# Patient Record
Sex: FEMALE | Race: WHITE | NOT HISPANIC OR LATINO | URBAN - METROPOLITAN AREA
[De-identification: names, ages, dates, MRNs, and addresses within clinical notes are randomized per-mention and may not be internally consistent; named-entity substitution may affect disease eponyms.]

---

## 2017-03-07 ENCOUNTER — EMERGENCY (EMERGENCY)
Facility: HOSPITAL | Age: 82
LOS: 0 days | Discharge: HOME | End: 2017-03-07
Attending: EMERGENCY MEDICINE | Admitting: EMERGENCY MEDICINE
Payer: MEDICARE

## 2017-03-07 VITALS
TEMPERATURE: 98 F | HEART RATE: 86 BPM | DIASTOLIC BLOOD PRESSURE: 54 MMHG | OXYGEN SATURATION: 100 % | SYSTOLIC BLOOD PRESSURE: 136 MMHG | RESPIRATION RATE: 18 BRPM

## 2017-03-07 VITALS — HEIGHT: 61 IN | WEIGHT: 173.94 LBS

## 2017-03-07 DIAGNOSIS — R60.0 LOCALIZED EDEMA: ICD-10-CM

## 2017-03-07 DIAGNOSIS — M79.89 OTHER SPECIFIED SOFT TISSUE DISORDERS: ICD-10-CM

## 2017-03-07 LAB
ALBUMIN SERPL ELPH-MCNC: 3.5 G/DL — SIGNIFICANT CHANGE UP (ref 3.3–5)
ALP SERPL-CCNC: 84 U/L — SIGNIFICANT CHANGE UP (ref 40–120)
ALT FLD-CCNC: 17 U/L — SIGNIFICANT CHANGE UP (ref 12–78)
ANION GAP SERPL CALC-SCNC: 7 MMOL/L — SIGNIFICANT CHANGE UP (ref 5–17)
AST SERPL-CCNC: 15 U/L — SIGNIFICANT CHANGE UP (ref 15–37)
BASOPHILS # BLD AUTO: 0.1 K/UL — SIGNIFICANT CHANGE UP (ref 0–0.2)
BASOPHILS NFR BLD AUTO: 0.7 % — SIGNIFICANT CHANGE UP (ref 0–2)
BILIRUB SERPL-MCNC: 0.2 MG/DL — SIGNIFICANT CHANGE UP (ref 0.2–1.2)
BUN SERPL-MCNC: 16 MG/DL — SIGNIFICANT CHANGE UP (ref 7–23)
CALCIUM SERPL-MCNC: 9.4 MG/DL — SIGNIFICANT CHANGE UP (ref 8.5–10.1)
CHLORIDE SERPL-SCNC: 112 MMOL/L — HIGH (ref 96–108)
CO2 SERPL-SCNC: 26 MMOL/L — SIGNIFICANT CHANGE UP (ref 22–31)
CREAT SERPL-MCNC: 0.53 MG/DL — SIGNIFICANT CHANGE UP (ref 0.5–1.3)
EOSINOPHIL # BLD AUTO: 0.2 K/UL — SIGNIFICANT CHANGE UP (ref 0–0.5)
EOSINOPHIL NFR BLD AUTO: 2.2 % — SIGNIFICANT CHANGE UP (ref 0–6)
GLUCOSE SERPL-MCNC: 107 MG/DL — HIGH (ref 70–99)
HCT VFR BLD CALC: 37.2 % — SIGNIFICANT CHANGE UP (ref 34.5–45)
HGB BLD-MCNC: 12 G/DL — SIGNIFICANT CHANGE UP (ref 11.5–15.5)
LYMPHOCYTES # BLD AUTO: 2.1 K/UL — SIGNIFICANT CHANGE UP (ref 1–3.3)
LYMPHOCYTES # BLD AUTO: 22.1 % — SIGNIFICANT CHANGE UP (ref 13–44)
MCHC RBC-ENTMCNC: 28.5 PG — SIGNIFICANT CHANGE UP (ref 27–34)
MCHC RBC-ENTMCNC: 32.2 GM/DL — SIGNIFICANT CHANGE UP (ref 32–36)
MCV RBC AUTO: 88.4 FL — SIGNIFICANT CHANGE UP (ref 80–100)
MONOCYTES # BLD AUTO: 0.7 K/UL — SIGNIFICANT CHANGE UP (ref 0–0.9)
MONOCYTES NFR BLD AUTO: 7.4 % — SIGNIFICANT CHANGE UP (ref 2–14)
NEUTROPHILS # BLD AUTO: 6.4 K/UL — SIGNIFICANT CHANGE UP (ref 1.8–7.4)
NEUTROPHILS NFR BLD AUTO: 67.6 % — SIGNIFICANT CHANGE UP (ref 43–77)
PLATELET # BLD AUTO: 212 K/UL — SIGNIFICANT CHANGE UP (ref 150–400)
POTASSIUM SERPL-MCNC: 4.2 MMOL/L — SIGNIFICANT CHANGE UP (ref 3.5–5.3)
POTASSIUM SERPL-SCNC: 4.2 MMOL/L — SIGNIFICANT CHANGE UP (ref 3.5–5.3)
PROT SERPL-MCNC: 6.7 GM/DL — SIGNIFICANT CHANGE UP (ref 6–8.3)
RBC # BLD: 4.21 M/UL — SIGNIFICANT CHANGE UP (ref 3.8–5.2)
RBC # FLD: 13.8 % — SIGNIFICANT CHANGE UP (ref 10.3–14.5)
SODIUM SERPL-SCNC: 145 MMOL/L — SIGNIFICANT CHANGE UP (ref 135–145)
WBC # BLD: 9.4 K/UL — SIGNIFICANT CHANGE UP (ref 3.8–10.5)
WBC # FLD AUTO: 9.4 K/UL — SIGNIFICANT CHANGE UP (ref 3.8–10.5)

## 2017-03-07 PROCEDURE — 99284 EMERGENCY DEPT VISIT MOD MDM: CPT

## 2017-03-07 PROCEDURE — 93970 EXTREMITY STUDY: CPT | Mod: 26

## 2017-03-07 NOTE — ED ADULT TRIAGE NOTE - CHIEF COMPLAINT QUOTE
dx of bilateral cellulitis on abx (cefadroxil) at home with no relief. sent in by Dr. Carpio for IV abx.

## 2017-03-07 NOTE — ED STATDOCS - NS ED MD SCRIBE ATTENDING SCRIBE SECTIONS
REVIEW OF SYSTEMS/DISPOSITION/PHYSICAL EXAM/PROGRESS NOTE/HISTORY OF PRESENT ILLNESS/RESULTS/PAST MEDICAL/SURGICAL/SOCIAL HISTORY/CONSULTATIONS/SHIFT CHANGE

## 2017-03-07 NOTE — ED STATDOCS - PROGRESS NOTE DETAILS
Reviewed labs and CT results with patient and patient family, no DVT, legs with 1+ pitting edema but only mild erythema with mild warmth, not spreading, improved erythema s/p antibiotics from Dr. Carpio, recommended f/u with Dr. Swain and elevatation of LAI Garvin PA-C

## 2017-03-07 NOTE — ED STATDOCS - ATTENDING CONTRIBUTION TO CARE
I, Beth Moreau, performed the initial face to face bedside interview with this patient regarding history of present illness, review of symptoms and relevant past medical, social and family history.  I completed an independent physical examination.  I was the initial provider who evaluated this patient. I have signed out the follow up of any pending tests (i.e. labs, radiological studies) to the ACP with instructions to review any pertinent findings to me prior to determining a final disposition.  I have communicated the patient’s plan of care and disposition with the ACP.  The history, relevant review of systems, past medical and surgical history, medical decision making, and physical examination was documented by the scribe in my presence and I attest to the accuracy of the documentation.

## 2017-03-07 NOTE — ED STATDOCS - OBJECTIVE STATEMENT
83 y/o F presents to ED with swelling and burning sensation to bilateral LE. Hot to touch. Saw Dr Carpio on Friday and was given cefadroxil and advised to go ER if sx had not improved by yesterday. Denies fever. Pt states swelling has improved with abx. Daughter at bedside.

## 2017-03-07 NOTE — ED ADULT NURSE NOTE - OBJECTIVE STATEMENT
Pt states she went to primary MD for cellulitis of left leg.  Pt states that she was on antibiotics and states the "swelling as gone down a little and the redness has gone down a little".

## 2017-03-07 NOTE — ED STATDOCS - MEDICAL DECISION MAKING DETAILS
bilateral lower leg swelling and redness, bilateral us r/o dvt, basic labs if all normal may be d/c tohome

## 2017-03-07 NOTE — ED STATDOCS - SKIN, MLM
Bilateral lower leg edema, left greater than right. Chronic vascular changes bilateral lower legs. Warm to touch, equal to touch in temperature. Cap refill <2 seconds bilateral toes. Unable to palpate pulse secondary to swelling. Skin in tact.

## 2017-03-31 NOTE — ED STATDOCS - DISCHARGE DATE
Changed refill to 90 days.  Will send to  to call patient about appointment once approved.    Pending Prescriptions:                       Disp   Refills    lisdexamfetamine (VYVANSE) 30 MG capsule  90 cap*0            Sig: Take 1 capsule (30 mg) by mouth every morning    Amparo Christie CMA     07-Mar-2017

## 2017-06-24 ENCOUNTER — EMERGENCY (EMERGENCY)
Facility: HOSPITAL | Age: 82
LOS: 0 days | Discharge: LEFT BEFORE DISCHARGE | End: 2017-06-24
Attending: EMERGENCY MEDICINE | Admitting: EMERGENCY MEDICINE
Payer: MEDICARE

## 2017-06-24 VITALS
HEART RATE: 92 BPM | SYSTOLIC BLOOD PRESSURE: 118 MMHG | TEMPERATURE: 98 F | OXYGEN SATURATION: 98 % | DIASTOLIC BLOOD PRESSURE: 83 MMHG | RESPIRATION RATE: 18 BRPM

## 2017-06-24 VITALS — HEIGHT: 65 IN

## 2017-06-24 DIAGNOSIS — E78.5 HYPERLIPIDEMIA, UNSPECIFIED: ICD-10-CM

## 2017-06-24 DIAGNOSIS — M79.89 OTHER SPECIFIED SOFT TISSUE DISORDERS: ICD-10-CM

## 2017-06-24 DIAGNOSIS — E11.9 TYPE 2 DIABETES MELLITUS WITHOUT COMPLICATIONS: ICD-10-CM

## 2017-06-24 DIAGNOSIS — I10 ESSENTIAL (PRIMARY) HYPERTENSION: ICD-10-CM

## 2017-06-24 DIAGNOSIS — L53.8 OTHER SPECIFIED ERYTHEMATOUS CONDITIONS: ICD-10-CM

## 2017-06-24 PROCEDURE — 99284 EMERGENCY DEPT VISIT MOD MDM: CPT

## 2017-06-24 NOTE — ED STATDOCS - OBJECTIVE STATEMENT
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 The pt provides that she had left leg cellulitis 2 weeks ago and finished her abx Cefadroxil  this Thursday, and started having swelling over the old area. No h/o cp, cough, sob, headache, fever, chills, dizziness, abd pain, nvd, dysuria or urinary incontinence.

## 2017-06-24 NOTE — ED STATDOCS - PROGRESS NOTE DETAILS
81 yo female with a PMH of of cad, stents, dm, htn, hld presents with left lower leg redness. Per daughter, pt was seen before and placed on cefadroxil for 10 days with resolution. 81 yo female with a PMH of of cad, stents, dm, htn, hld presents with left lower leg redness. Per daughter, pt was seen before and placed on cefadroxil for 10 days with resolution. Now they notice the redness coming back. Was told by pmd to come to the ER. Will get sono to r/o DVT> Discussed with Dr. LEWIS, if sono negative will another 4 days of abx to prevent another cellulitis event. -Jamal Oseguera PA-C Per daughter, pt wants to leave and does not want the sono. She is aware of the risks of leaving AMA and is A&Ox3 and capable to make decisions for herself. Pt was prescribed the medication to her pharmacy. - Phoebe Oseguera PA-C

## 2017-06-24 NOTE — ED STATDOCS - CHPI ED SYMPTOM NEG
no dysuria/no blood in stool/no burning urination/no nausea/no fever/no abdominal distention/no hematuria/no vomiting/no palpitations/no diarrhea

## 2017-06-24 NOTE — ED STATDOCS - ATTENDING CONTRIBUTION TO CARE
I, Beth Moreau, performed the initial face to face bedside interview with this patient regarding history of present illness, review of symptoms and relevant past medical, social and family history.  I completed an independent physical examination.  I was the initial provider who evaluated this patient. I have signed out the follow up of any pending tests (i.e. labs, radiological studies) to the ACP with instructions to review any pertinent findings to me prior to determining a final disposition.

## 2017-06-24 NOTE — ED STATDOCS - SKIN, MLM
2x3 healing wound on left shin on medial side with mild swelling, erythematous, tenderness on palpation

## 2017-08-01 ENCOUNTER — APPOINTMENT (OUTPATIENT)
Dept: SURGICAL ONCOLOGY | Facility: CLINIC | Age: 82
End: 2017-08-01
Payer: MEDICARE

## 2017-08-01 VITALS
DIASTOLIC BLOOD PRESSURE: 74 MMHG | HEART RATE: 84 BPM | OXYGEN SATURATION: 96 % | WEIGHT: 168 LBS | BODY MASS INDEX: 31.72 KG/M2 | SYSTOLIC BLOOD PRESSURE: 150 MMHG | HEIGHT: 61 IN

## 2017-08-01 PROCEDURE — 99215 OFFICE O/P EST HI 40 MIN: CPT

## 2018-08-07 ENCOUNTER — APPOINTMENT (OUTPATIENT)
Dept: SURGICAL ONCOLOGY | Facility: CLINIC | Age: 83
End: 2018-08-07
Payer: MEDICARE

## 2018-08-07 VITALS
HEIGHT: 61 IN | HEART RATE: 94 BPM | SYSTOLIC BLOOD PRESSURE: 120 MMHG | WEIGHT: 178 LBS | RESPIRATION RATE: 15 BRPM | DIASTOLIC BLOOD PRESSURE: 75 MMHG | BODY MASS INDEX: 33.61 KG/M2

## 2018-08-07 DIAGNOSIS — Z85.3 ENCOUNTER FOR FOLLOW-UP EXAMINATION AFTER COMPLETED TREATMENT FOR MALIGNANT NEOPLASM: ICD-10-CM

## 2018-08-07 DIAGNOSIS — Z08 ENCOUNTER FOR FOLLOW-UP EXAMINATION AFTER COMPLETED TREATMENT FOR MALIGNANT NEOPLASM: ICD-10-CM

## 2018-08-07 PROCEDURE — 99214 OFFICE O/P EST MOD 30 MIN: CPT

## 2018-11-29 ENCOUNTER — INPATIENT (INPATIENT)
Facility: HOSPITAL | Age: 83
LOS: 2 days | Discharge: NURSING, REHAB OR LONG-TERM CARE | End: 2018-12-02
Attending: HOSPITALIST | Admitting: HOSPITALIST
Payer: MEDICARE

## 2018-11-29 VITALS
RESPIRATION RATE: 15 BRPM | TEMPERATURE: 98 F | DIASTOLIC BLOOD PRESSURE: 80 MMHG | HEART RATE: 87 BPM | HEIGHT: 65 IN | SYSTOLIC BLOOD PRESSURE: 136 MMHG | OXYGEN SATURATION: 100 % | WEIGHT: 169.98 LBS

## 2018-11-29 LAB
ANION GAP SERPL CALC-SCNC: 9 MMOL/L — SIGNIFICANT CHANGE UP (ref 5–17)
BASOPHILS # BLD AUTO: 0.05 K/UL — SIGNIFICANT CHANGE UP (ref 0–0.2)
BASOPHILS NFR BLD AUTO: 0.5 % — SIGNIFICANT CHANGE UP (ref 0–2)
BUN SERPL-MCNC: 17 MG/DL — SIGNIFICANT CHANGE UP (ref 7–23)
CALCIUM SERPL-MCNC: 9.5 MG/DL — SIGNIFICANT CHANGE UP (ref 8.5–10.1)
CHLORIDE SERPL-SCNC: 110 MMOL/L — HIGH (ref 96–108)
CO2 SERPL-SCNC: 25 MMOL/L — SIGNIFICANT CHANGE UP (ref 22–31)
CREAT SERPL-MCNC: 0.58 MG/DL — SIGNIFICANT CHANGE UP (ref 0.5–1.3)
EOSINOPHIL # BLD AUTO: 0.14 K/UL — SIGNIFICANT CHANGE UP (ref 0–0.5)
EOSINOPHIL NFR BLD AUTO: 1.4 % — SIGNIFICANT CHANGE UP (ref 0–6)
GLUCOSE BLDC GLUCOMTR-MCNC: 159 MG/DL — HIGH (ref 70–99)
GLUCOSE SERPL-MCNC: 124 MG/DL — HIGH (ref 70–99)
HCT VFR BLD CALC: 37.6 % — SIGNIFICANT CHANGE UP (ref 34.5–45)
HGB BLD-MCNC: 12.1 G/DL — SIGNIFICANT CHANGE UP (ref 11.5–15.5)
IMM GRANULOCYTES NFR BLD AUTO: 0.7 % — SIGNIFICANT CHANGE UP (ref 0–1.5)
LYMPHOCYTES # BLD AUTO: 1.82 K/UL — SIGNIFICANT CHANGE UP (ref 1–3.3)
LYMPHOCYTES # BLD AUTO: 17.9 % — SIGNIFICANT CHANGE UP (ref 13–44)
MCHC RBC-ENTMCNC: 29.3 PG — SIGNIFICANT CHANGE UP (ref 27–34)
MCHC RBC-ENTMCNC: 32.2 GM/DL — SIGNIFICANT CHANGE UP (ref 32–36)
MCV RBC AUTO: 91 FL — SIGNIFICANT CHANGE UP (ref 80–100)
MONOCYTES # BLD AUTO: 0.84 K/UL — SIGNIFICANT CHANGE UP (ref 0–0.9)
MONOCYTES NFR BLD AUTO: 8.3 % — SIGNIFICANT CHANGE UP (ref 2–14)
NEUTROPHILS # BLD AUTO: 7.26 K/UL — SIGNIFICANT CHANGE UP (ref 1.8–7.4)
NEUTROPHILS NFR BLD AUTO: 71.2 % — SIGNIFICANT CHANGE UP (ref 43–77)
NRBC # BLD: 0 /100 WBCS — SIGNIFICANT CHANGE UP (ref 0–0)
PLATELET # BLD AUTO: 192 K/UL — SIGNIFICANT CHANGE UP (ref 150–400)
POTASSIUM SERPL-MCNC: 4.5 MMOL/L — SIGNIFICANT CHANGE UP (ref 3.5–5.3)
POTASSIUM SERPL-SCNC: 4.5 MMOL/L — SIGNIFICANT CHANGE UP (ref 3.5–5.3)
RBC # BLD: 4.13 M/UL — SIGNIFICANT CHANGE UP (ref 3.8–5.2)
RBC # FLD: 13.4 % — SIGNIFICANT CHANGE UP (ref 10.3–14.5)
SODIUM SERPL-SCNC: 144 MMOL/L — SIGNIFICANT CHANGE UP (ref 135–145)
WBC # BLD: 10.18 K/UL — SIGNIFICANT CHANGE UP (ref 3.8–10.5)
WBC # FLD AUTO: 10.18 K/UL — SIGNIFICANT CHANGE UP (ref 3.8–10.5)

## 2018-11-29 PROCEDURE — 72125 CT NECK SPINE W/O DYE: CPT | Mod: 26

## 2018-11-29 PROCEDURE — 73590 X-RAY EXAM OF LOWER LEG: CPT | Mod: 26,LT

## 2018-11-29 PROCEDURE — 70450 CT HEAD/BRAIN W/O DYE: CPT | Mod: 26

## 2018-11-29 PROCEDURE — 99285 EMERGENCY DEPT VISIT HI MDM: CPT

## 2018-11-29 PROCEDURE — 73562 X-RAY EXAM OF KNEE 3: CPT | Mod: 26,LT

## 2018-11-29 PROCEDURE — 73630 X-RAY EXAM OF FOOT: CPT | Mod: 26,LT

## 2018-11-29 PROCEDURE — 72170 X-RAY EXAM OF PELVIS: CPT | Mod: 26

## 2018-11-29 PROCEDURE — 93010 ELECTROCARDIOGRAM REPORT: CPT

## 2018-11-29 RX ORDER — INSULIN LISPRO 100/ML
VIAL (ML) SUBCUTANEOUS AT BEDTIME
Qty: 0 | Refills: 0 | Status: DISCONTINUED | OUTPATIENT
Start: 2018-11-29 | End: 2018-12-02

## 2018-11-29 RX ORDER — DEXTROSE 50 % IN WATER 50 %
12.5 SYRINGE (ML) INTRAVENOUS ONCE
Qty: 0 | Refills: 0 | Status: DISCONTINUED | OUTPATIENT
Start: 2018-11-29 | End: 2018-12-02

## 2018-11-29 RX ORDER — DEXTROSE 50 % IN WATER 50 %
25 SYRINGE (ML) INTRAVENOUS ONCE
Qty: 0 | Refills: 0 | Status: DISCONTINUED | OUTPATIENT
Start: 2018-11-29 | End: 2018-12-02

## 2018-11-29 RX ORDER — HEPARIN SODIUM 5000 [USP'U]/ML
5000 INJECTION INTRAVENOUS; SUBCUTANEOUS EVERY 12 HOURS
Qty: 0 | Refills: 0 | Status: DISCONTINUED | OUTPATIENT
Start: 2018-11-29 | End: 2018-12-02

## 2018-11-29 RX ORDER — ASPIRIN/CALCIUM CARB/MAGNESIUM 324 MG
81 TABLET ORAL DAILY
Qty: 0 | Refills: 0 | Status: DISCONTINUED | OUTPATIENT
Start: 2018-11-29 | End: 2018-12-02

## 2018-11-29 RX ORDER — LOSARTAN POTASSIUM 100 MG/1
25 TABLET, FILM COATED ORAL DAILY
Qty: 0 | Refills: 0 | Status: DISCONTINUED | OUTPATIENT
Start: 2018-11-29 | End: 2018-12-02

## 2018-11-29 RX ORDER — ATORVASTATIN CALCIUM 80 MG/1
80 TABLET, FILM COATED ORAL AT BEDTIME
Qty: 0 | Refills: 0 | Status: DISCONTINUED | OUTPATIENT
Start: 2018-11-29 | End: 2018-12-02

## 2018-11-29 RX ORDER — DEXTROSE 50 % IN WATER 50 %
15 SYRINGE (ML) INTRAVENOUS ONCE
Qty: 0 | Refills: 0 | Status: DISCONTINUED | OUTPATIENT
Start: 2018-11-29 | End: 2018-12-02

## 2018-11-29 RX ORDER — ACETAMINOPHEN 500 MG
650 TABLET ORAL EVERY 6 HOURS
Qty: 0 | Refills: 0 | Status: DISCONTINUED | OUTPATIENT
Start: 2018-11-29 | End: 2018-12-02

## 2018-11-29 RX ORDER — GLUCAGON INJECTION, SOLUTION 0.5 MG/.1ML
1 INJECTION, SOLUTION SUBCUTANEOUS ONCE
Qty: 0 | Refills: 0 | Status: DISCONTINUED | OUTPATIENT
Start: 2018-11-29 | End: 2018-12-02

## 2018-11-29 RX ORDER — INSULIN LISPRO 100/ML
VIAL (ML) SUBCUTANEOUS
Qty: 0 | Refills: 0 | Status: DISCONTINUED | OUTPATIENT
Start: 2018-11-29 | End: 2018-12-02

## 2018-11-29 RX ORDER — SODIUM CHLORIDE 9 MG/ML
1000 INJECTION INTRAMUSCULAR; INTRAVENOUS; SUBCUTANEOUS
Qty: 0 | Refills: 0 | Status: DISCONTINUED | OUTPATIENT
Start: 2018-11-29 | End: 2018-12-02

## 2018-11-29 RX ORDER — SODIUM CHLORIDE 9 MG/ML
1000 INJECTION, SOLUTION INTRAVENOUS
Qty: 0 | Refills: 0 | Status: DISCONTINUED | OUTPATIENT
Start: 2018-11-29 | End: 2018-12-02

## 2018-11-29 NOTE — CONSULT NOTE ADULT - SUBJECTIVE AND OBJECTIVE BOX
Date of Service: 11/29/18  CHIEF COMPLAINT: left foot pain.  HPI: 84 year old female patient history significant for Diabetes Mellitus Type II was seen by Podiatry in the ED on 11/29/18 with the cc of left foot pain. Patient's daughter accompanies patient for the duration of the patient's visit. According to patient, she tripped and fell at 12:30 PM on 11/29/18  when she went to  her phone, Patient states she hit her head and landed on her left foot. Patient states she began to have left foot pain, pointing to the outer side of his left foot as the area of maximal pain. Patient also admits to left knee pain. Patient denies loss of consciousness from the fall. Patient denies left ankle pain and denies any trauma to the right foot and right ankle. Patient rates the pain a 9 out of 10 on the Visual Analog scale. Patient states she is having pain and difficulty walking on the left foot after the injury. Patient denies taking any over the counter pain medications for treatment. Patient denies any f/n/v/c/sob/cp/ab pain at this time.   Allergies  codeine, Darvon  PAST MDICAL & SURGICAL HISTORy: Diabetes mellitus type 2 according to patient    SOCIAL HISTORY:  patient denies tobacco, alcohol, and recreational drug use.    VITALS:  ICU Vital Signs Last 24 Hrs  T(C): 36.8 (29 Nov 2018 17:17), Max: 36.8 (29 Nov 2018 13:58)  T(F): 98.3 (29 Nov 2018 17:17), Max: 98.3 (29 Nov 2018 13:58)  HR: 85 (29 Nov 2018 17:17) (75 - 87)  BP: 152/71 (29 Nov 2018 17:17) (136/80 - 152/71)  BP(mean): --  ABP: --  ABP(mean): --  RR: 17 (29 Nov 2018 17:17) (15 - 18)  SpO2: 99% (29 Nov 2018 17:17) (99% - 100%)      REVIEW OF SYSTEMS:  CONSTITUTIONAL: No weakness, fevers or chills  EYES/ENT: No visual changes;  No vertigo or throat pain   NECK: No pain or stiffness  RESPIRATORY: No cough, wheezing, hemoptysis; No shortness of breath  CARDIOVASCULAR: No chest pain or palpitations  GASTROINTESTINAL: No abdominal or epigastric pain. No nausea, vomiting, or hematemesis; No diarrhea or constipation. No melena or hematochezia.  GENITOURINARY: No dysuria, frequency or hematuria  NEUROLOGICAL: No numbness or weakness  SKIN: visible swelling to the outer aspect of the left foot with no breaks in the skin integrity of the left foot.  All other review of systems is negative unless indicated above    	  Physical Exam Lower Extremity Focused:  Dermatolgic: No open lesions or inter-digital macerations noted bilateral. Minimal Edema noted to the lateral forefoot left foot; - eccymosis noted to the left foot.  Vascular: Dorsalis Pedis and Posterior Tibial pulses 2/4 b/l. Capillary re-fill time less than 3 seconds digits 1-5 bilateral. Temperature gradient warm to cool b/l  Neurologic: Protective and light touch sensation intact to the level of the digits bilateral.  Orthopedic: Pain on palpation lateral border of left foot near the base of left 5th metatatrsal bone. Pain with ROM of motion of the ankle and subtalar joints left side and Muscle power graded 4/5 with ankle plantarflexion and dorsiflexion as well as subtalar joint inversion and eversion against resistance. Pt able to demonstrate wiggling of all 10 toes. No pain on palpation at the medial and lateral malleoli of the left ankle. Negative anterior drawer and negative talar tilt tests b/l.   RAD:   < from: Xray Foot AP + Lateral + Oblique, Left (11.29.18 @ 14:49) >    IMPRESSION:   nondisplaced transverse fracture through the base of the   fifth metacarpal. Small plantar calcaneal spur.     < end of copied text >  < from: Xray Pelvis AP only (11.29.18 @ 14:50) >  IMPRESSION:   Unremarkable radiographs of the pelvis.         < end of copied text >    < from: CT Cervical Spine No Cont (11.29.18 @ 14:38) >  IMPRESSION:   No vertebral fracture is recognized.    < end of copied text >    < from: CT Head No Cont (11.29.18 @ 14:31) >  IMPRESSION:   mild periventricular white matter ischemia.   Global   atrophy.      < end of copied text >    Xray Knee: Official report pending. Reading of knee deferred to radiologist.

## 2018-11-29 NOTE — ED ADULT NURSE NOTE - CHPI ED NUR SYMPTOMS NEG
no abrasion/no bleeding/no confusion/no numbness/no weakness/no deformity/no tingling/no loss of consciousness/no fever/no vomiting

## 2018-11-29 NOTE — ED PROVIDER NOTE - CONSTITUTIONAL, MLM
normal... older white female, alert, no respiratory discomfort, non toxic, normocephalic, atraumatic, scalp appears intact

## 2018-11-29 NOTE — ED PROVIDER NOTE - OBJECTIVE STATEMENT
85 y/o female with no pertinent PMHx presents to the ED s/p fall. Pt reports at 12:30PM she went to  the phone when she tripped over her feet and fell back, hitting her head on a utility cart. No LOC, unable to get up after the fall. Now c/o neck pain and left ankle/foot pain. Denies hip pain. On baby ASA. 85 y/o female with no pertinent PMHx presents to the ED s/p fall. Pt reports at 12:30PM she went to  the phone when she tripped over her feet and fell back, hitting her head on a utility cart. No LOC, unable to get up after the fall. Now c/o neck pain and left ankle/foot/leg pain. Denies hip pain. On baby ASA. PCP: Shirin.

## 2018-11-29 NOTE — ED PROVIDER NOTE - CARE PLAN
Principal Discharge DX:	Closed nondisplaced fracture of fifth metatarsal bone of left foot, initial encounter  Secondary Diagnosis:	Fall, initial encounter

## 2018-11-29 NOTE — ED PROVIDER NOTE - ENMT, MLM
Airway patent, Nasal mucosa clear. Mouth with normal mucosa. Throat has no vesicles, no oropharyngeal exudates and uvula is midline. oropharynx clear, +mucous membranes

## 2018-11-29 NOTE — ED PROVIDER NOTE - EYES, MLM
Clear bilaterally, pupils equal, round and reactive to light. EOMI Clear bilaterally, pupils equal, round and reactive to light. EOMI, negative raccoons

## 2018-11-29 NOTE — ED PROVIDER NOTE - MUSCULOSKELETAL [+], MLM
+left ankle pain +left foot pain/NECK PAIN NECK PAIN/+left ankle pain +left foot pain +left leg pain

## 2018-11-29 NOTE — H&P ADULT - NSHPPHYSICALEXAM_GEN_ALL_CORE
Vital Signs Last 24 Hrs  T(C): 36.8 (29 Nov 2018 17:17), Max: 36.8 (29 Nov 2018 13:58)  T(F): 98.3 (29 Nov 2018 17:17), Max: 98.3 (29 Nov 2018 13:58)  HR: 85 (29 Nov 2018 17:17) (75 - 87)  BP: 152/71 (29 Nov 2018 17:17) (136/80 - 152/71)  BP(mean): --  RR: 17 (29 Nov 2018 17:17) (15 - 18)  SpO2: 99% (29 Nov 2018 17:17) (99% - 100%)    GEN: appears comfortable  Neuro: AAOx3, nonfocal  HEENT: NC/AT, EOMI  Neck: no thyroidmegaly, no JVD  Cardiovascular: S1S2 present, regular rhythm, soft systolic murmur  Respiratory: breath sounds normal bilaterally, no wheezing, no rales, no rhonchi  Gastrointestinal: bowel sounds normal, soft, no abdominal tenderness  Musculoskeletal: no muscle tenderness on right, left foot wrapped by podiatry  Extremities: No edema  Skin: No rash

## 2018-11-29 NOTE — H&P ADULT - ASSESSMENT
84 y.o. female with PMH HTN, HLD, DM2, CAD s/p stent 10 yr ago, right breast cancer s/p mastectomy 27 yr ago presents with s/p fall. Pt reports walking to answer the phone when she tripped and fell backwards with neck trauma. Pt reports initial pain, but denies any at this time. Denies lightheadedness or dizziness prior to falling. No LOC. Denies fever, chills, CP, SOB, abd pain, dysuria, diarrhea. Pt reports difficulty walking due to pain. Pt baseline lives at home and does ADLs.    #s/p fall, mechanical by history  #left nondisplaced transverse fracture of 5th metatarsal bone  #difficulty ambulating  -admit to med surg  -PT eval  -f/u podiatry consult appreciated  -prn tylenol for pain  -gentle iv fluid    #DM2  -hold metformin and januvia  -fingerstick monitoring and ISS    #HTN, HLD  -cont losartan, statin  -will start at low ARB dose, may need to titrate up (pt doesn't remember the dosage)    #CAD s/p stent  -on ASA and statin    #DVT ppx  -heparin SC    #med list based on [facility] as pt doesnt remember the dosage  -will need additional verification in AM    #advanced care planning  -discussed with patient regarding code status/advanced directives  -pt wishes that be resusitated if anything were to happen to her  -pt is FULL code  -time: 16 min

## 2018-11-29 NOTE — ED PROVIDER NOTE - MUSCULOSKELETAL, MLM
Spine appears normal, range of motion is not limited, no muscle or joint tenderness Spine appears normal, range of motion is not limited, no muscle or joint tenderness. Back: non tender, stable. Neck: non tender, supple, without pain. Left anterior superior iliac spine mildly tender, no swelling. +bilateral SLR 25 degrees. Left knee: mildly swollen, no discoloration, no gross deformity. Left anterior shin mildy TTP. Left foot: +tender dorsum overlying 4/5 metatarsal, no deformity. LLE distal motor sensory normal, DP pulse good.

## 2018-11-29 NOTE — ED ADULT NURSE NOTE - NSIMPLEMENTINTERV_GEN_ALL_ED
Implemented All Fall with Harm Risk Interventions:  [place] to call system. Call bell, personal items and telephone within reach. Instruct patient to call for assistance. Room bathroom lighting operational. Non-slip footwear when patient is off stretcher. Physically safe environment: no spills, clutter or unnecessary equipment. Stretcher in lowest position, wheels locked, appropriate side rails in place. Provide visual cue, wrist band, yellow gown, etc. Monitor gait and stability. Monitor for mental status changes and reorient to person, place, and time. Review medications for side effects contributing to fall risk. Reinforce activity limits and safety measures with patient and family. Provide visual clues: red socks.

## 2018-11-29 NOTE — ED PROVIDER NOTE - CHPI ED SYMPTOMS POS
PAIN/+neck pain +left ankle pain +left foot pain PAIN/+neck pain +left ankle pain +left foot pain +left leg pain

## 2018-11-29 NOTE — CONSULT NOTE ADULT - ASSESSMENT
Assessment:  Patient is a 84 year old female seen by Podiatry in the ED for the followin. closed comminuted non-displaced fracture at base of 5th metatarsal bone at the styloid process, left foot  2.Pain in left foot  3. Difficulty walking    Plan:  Patient was seen and examined by Podiatry; treatment and plan were discussed with attending Dr. Kerry Rose.  Imaging was reviewed noting non-displaced fracture at base of 5th metatarsal bone at the styloid process with comminution , left foot.  After assessment of patient, patient's lower extremity was immboilized with application posterior splint using stockinette, cast padding, ACE and fiberglass splint material. Splint was applied with patient's left ankle held in neutral position.   Surgical shoe given to patient.  Patient advised to wear surgical shoe over the guillen compression and posterior splint and partial weight bear on the heel with concommiant use of a walker at all times when ambulating.   PT consult placed for dispensing walker and training patient to use walker while patient is partial weight bearing on the left heel with surgical shoe over the guillen compression and splint on the left side at all times when ambulating.   Patient admitted under care by medicine appreciated.   Patient advised to practice rest, ice, elevation and compression of the left lower extrremity.  Patient advised he has to keep splint clean dry, and intact at all times.  Instructed to patient if she continues to walk on left foot the fracture may risk the possibilty of further fracturing of the bone and/or displacement of the bone and further eccentric pull of the peoreus brevis tendon, all of which are possibilities. Patient advised of the possibility of surgery in the future for treatment if his fracture does not heal appropriately.   Patient and daughter demonstrated verbal understanding of all interventions and treatment. Patient tolerated interventions well and without incident.   Podiatry will follow patient while in house.

## 2018-11-29 NOTE — ED PROVIDER NOTE - PROGRESS NOTE DETAILS
Dr. Matson:  Podiatry resident aware of ED consult.  Signout to Morenita Cash: [] Podiatry consult,  [] expect D/C afterwards w/ walker, outpt Podiatry f/u. Faith MAGDALENO: As per Signout pt is to await podiatry consult and be d/c.   Podiatry saw patient Faith MAGDALENO: Pt family has concerns about patient going home. Unsafe d/c because family states cannot care for her at all.

## 2018-11-29 NOTE — ED PROVIDER NOTE - MEDICAL DECISION MAKING DETAILS
83 y/o female 85 y/o female on baby ASA BIBA from home s/p lost balance and hit head. No LOC. Pt complains of left foot pain and neck pain. Plan trauma alert, CT head/c-spine, XR left foot and pelvis, observe, reassess.

## 2018-11-30 LAB
ANION GAP SERPL CALC-SCNC: 10 MMOL/L — SIGNIFICANT CHANGE UP (ref 5–17)
BASOPHILS # BLD AUTO: 0.05 K/UL — SIGNIFICANT CHANGE UP (ref 0–0.2)
BASOPHILS NFR BLD AUTO: 0.7 % — SIGNIFICANT CHANGE UP (ref 0–2)
BUN SERPL-MCNC: 16 MG/DL — SIGNIFICANT CHANGE UP (ref 7–23)
CALCIUM SERPL-MCNC: 9.1 MG/DL — SIGNIFICANT CHANGE UP (ref 8.5–10.1)
CHLORIDE SERPL-SCNC: 107 MMOL/L — SIGNIFICANT CHANGE UP (ref 96–108)
CO2 SERPL-SCNC: 26 MMOL/L — SIGNIFICANT CHANGE UP (ref 22–31)
CREAT SERPL-MCNC: 0.61 MG/DL — SIGNIFICANT CHANGE UP (ref 0.5–1.3)
EOSINOPHIL # BLD AUTO: 0.12 K/UL — SIGNIFICANT CHANGE UP (ref 0–0.5)
EOSINOPHIL NFR BLD AUTO: 1.6 % — SIGNIFICANT CHANGE UP (ref 0–6)
GLUCOSE BLDC GLUCOMTR-MCNC: 117 MG/DL — HIGH (ref 70–99)
GLUCOSE BLDC GLUCOMTR-MCNC: 150 MG/DL — HIGH (ref 70–99)
GLUCOSE BLDC GLUCOMTR-MCNC: 182 MG/DL — HIGH (ref 70–99)
GLUCOSE BLDC GLUCOMTR-MCNC: 210 MG/DL — HIGH (ref 70–99)
GLUCOSE SERPL-MCNC: 141 MG/DL — HIGH (ref 70–99)
HBA1C BLD-MCNC: 7.3 % — HIGH (ref 4–5.6)
HCT VFR BLD CALC: 35.6 % — SIGNIFICANT CHANGE UP (ref 34.5–45)
HGB BLD-MCNC: 11.6 G/DL — SIGNIFICANT CHANGE UP (ref 11.5–15.5)
IMM GRANULOCYTES NFR BLD AUTO: 0.9 % — SIGNIFICANT CHANGE UP (ref 0–1.5)
LYMPHOCYTES # BLD AUTO: 1.47 K/UL — SIGNIFICANT CHANGE UP (ref 1–3.3)
LYMPHOCYTES # BLD AUTO: 19.2 % — SIGNIFICANT CHANGE UP (ref 13–44)
MCHC RBC-ENTMCNC: 28.7 PG — SIGNIFICANT CHANGE UP (ref 27–34)
MCHC RBC-ENTMCNC: 32.6 GM/DL — SIGNIFICANT CHANGE UP (ref 32–36)
MCV RBC AUTO: 88.1 FL — SIGNIFICANT CHANGE UP (ref 80–100)
MONOCYTES # BLD AUTO: 0.71 K/UL — SIGNIFICANT CHANGE UP (ref 0–0.9)
MONOCYTES NFR BLD AUTO: 9.3 % — SIGNIFICANT CHANGE UP (ref 2–14)
NEUTROPHILS # BLD AUTO: 5.23 K/UL — SIGNIFICANT CHANGE UP (ref 1.8–7.4)
NEUTROPHILS NFR BLD AUTO: 68.3 % — SIGNIFICANT CHANGE UP (ref 43–77)
NRBC # BLD: 0 /100 WBCS — SIGNIFICANT CHANGE UP (ref 0–0)
PLATELET # BLD AUTO: 176 K/UL — SIGNIFICANT CHANGE UP (ref 150–400)
POTASSIUM SERPL-MCNC: 4 MMOL/L — SIGNIFICANT CHANGE UP (ref 3.5–5.3)
POTASSIUM SERPL-SCNC: 4 MMOL/L — SIGNIFICANT CHANGE UP (ref 3.5–5.3)
RBC # BLD: 4.04 M/UL — SIGNIFICANT CHANGE UP (ref 3.8–5.2)
RBC # FLD: 13.6 % — SIGNIFICANT CHANGE UP (ref 10.3–14.5)
SODIUM SERPL-SCNC: 143 MMOL/L — SIGNIFICANT CHANGE UP (ref 135–145)
WBC # BLD: 7.65 K/UL — SIGNIFICANT CHANGE UP (ref 3.8–10.5)
WBC # FLD AUTO: 7.65 K/UL — SIGNIFICANT CHANGE UP (ref 3.8–10.5)

## 2018-11-30 PROCEDURE — 99221 1ST HOSP IP/OBS SF/LOW 40: CPT

## 2018-11-30 RX ADMIN — Medication 650 MILLIGRAM(S): at 10:34

## 2018-11-30 RX ADMIN — Medication 2: at 12:58

## 2018-11-30 RX ADMIN — HEPARIN SODIUM 5000 UNIT(S): 5000 INJECTION INTRAVENOUS; SUBCUTANEOUS at 18:27

## 2018-11-30 RX ADMIN — ATORVASTATIN CALCIUM 80 MILLIGRAM(S): 80 TABLET, FILM COATED ORAL at 21:36

## 2018-11-30 RX ADMIN — Medication 650 MILLIGRAM(S): at 00:45

## 2018-11-30 RX ADMIN — Medication 650 MILLIGRAM(S): at 01:15

## 2018-11-30 RX ADMIN — ATORVASTATIN CALCIUM 80 MILLIGRAM(S): 80 TABLET, FILM COATED ORAL at 00:39

## 2018-11-30 RX ADMIN — HEPARIN SODIUM 5000 UNIT(S): 5000 INJECTION INTRAVENOUS; SUBCUTANEOUS at 06:45

## 2018-11-30 RX ADMIN — Medication 81 MILLIGRAM(S): at 12:19

## 2018-11-30 RX ADMIN — LOSARTAN POTASSIUM 25 MILLIGRAM(S): 100 TABLET, FILM COATED ORAL at 06:45

## 2018-11-30 RX ADMIN — Medication 650 MILLIGRAM(S): at 11:04

## 2018-11-30 NOTE — CHART NOTE - NSCHARTNOTEFT_GEN_A_CORE
Podiatry PM Rounding:    Patient seen at bedside by Podiatry.   Patient states she feels her splint and bandaging on her Left lower extremity extend too proximal, limiting her knee ROM and exacerbating her anterior and posterior knee pain s/p mechanical fall yesterday, 11/30.   [place] compression left intact and patient was resplinted with posterior splint ending approximately 5 inches shorter than last splint.  Splint was applied with no complications. Patient related the splint felt much better and that she was able to better move her knee through ROM.   Also discussed matter with Orthopedics given Ortho consult for knee pain. Ortho consult note noted.  Patient expressed satisfaction with application of new splint.   Podiatry will continue to follow patient. Podiatry PM Rounding:    Patient seen at bedside by Podiatry.   Patient states she feels her splint and bandaging on her Left lower extremity extend too proximal, limiting her knee ROM and exacerbating her anterior and posterior knee pain s/p mechanical fall yesterday, 11/29/18.   Amezcua compression left intact and patient was resplinted with posterior splint ending approximately 5 inches shorter than last splint.  Splint was applied with no complications. Patient related the splint felt much better and that she was able to better move her knee through ROM.   Also discussed matter with Orthopedics given Ortho consult for knee pain. Ortho consult note noted.  Patient expressed satisfaction with application of new splint.   Podiatry will continue to follow patient.

## 2018-11-30 NOTE — PATIENT PROFILE ADULT - NSPROEDALEARNPREF_GEN_A_NUR
individual instruction/pictorial/skill demonstration/audio/video/written material/group instruction/verbal instruction

## 2018-11-30 NOTE — PHYSICAL THERAPY INITIAL EVALUATION ADULT - GENERAL OBSERVATIONS, REHAB EVAL
L LE splinted / surgical shoe donned; pt rec'd in bed supine; R UE edemeatous (Hx of lymphedema); pt denied pain @ rest

## 2018-11-30 NOTE — PHYSICAL THERAPY INITIAL EVALUATION ADULT - MODALITIES TREATMENT COMMENTS
pt left seated in chair post Eval; chair alarm donned; L LE elevated on stool/pillow; callbell in reach; pt instructed not to get up alone; call nursing for assist; wong fair; pain level 7/10; Dr. Ross present / aware; CARO Storm made aware pt OOB

## 2018-11-30 NOTE — CONSULT NOTE ADULT - SUBJECTIVE AND OBJECTIVE BOX
Orthopedics Consult Note:    This is a 84y Female who admitted to Medical service with a left 5th metatarsal fracture s/p trip and fall yesterday. Orthopedics consult placed to evaluate pt c/o left knee pain. Pt seen at bedside on the floor reporting chronic B/L knee pain (L>R) secondary to OA. Pt reports increased posterior knee pain s/p "bandage placement" for foot fracture. Pt reports the "bandage feels too tight." Pt reports head trauma, denies LOC. Pt denies any other injuries. Pt denies nuPt denies any other injuries. Pt denies any numbness, tingling or parethesias.    Past Medical/Surgical History:  CLOSED FRACTURE OF FIFTH METATARSAL BONE OF LEFT FOOT  MEWS Score  No pertinent past medical history  No pertinent past medical history  No pertinent past medical history  Closed nondisplaced fracture of fifth metatarsal bone of left foot, initial encounter  No significant past surgical history  No significant past surgical history  No significant past surgical history  No significant past surgical history  FALL  90+  Fall, initial encounter    Medication:  codeine (Other)  Darvon (Other)    Imaging:  X-rays of the left knee demonstrate severe OA; no evidence of acute fracture.   X-rays of the left foot demonstrate a nondisplaced base of the 5th metatarsal fracture.    Labs:                        11.6   7.65  )-----------( 176      ( 30 Nov 2018 06:58 )             35.6   11-30    143  |  107  |  16  ----------------------------<  141<H>  4.0   |  26  |  0.61    Ca    9.1      30 Nov 2018 06:58    PE Left Knee:   Posterior splint and surgical shoe in place, posterior splint goes into popliteal fossa. No swelling, no erythema, no ecchymosis, skin intact, normothermic. +mild MJLT B/L. +TTP under proximal splint edge. Difficulty assessing ROM 2' posterior splint too long, can flex to ~45' due mechanical block with proximal splint edge digging into popliteal fossa w/ associated pain. No pain with micromotion. Able to SLR. No pain with log roll. No pain with axial loading. Cap refill <2secs. Moving all toes, sensation intact.     Secondary Survey:  Left hip, RLE and B/L UEs with no swelling, no ecchymoses, no abrasions, no lacerations or ny other signs of injury with full painless baseline ROM and no bony TTP. Sensation intact distally, moving all digits. Distal pulses intact.     Spine and ribs with no bony TTP.     Assessment:  84y Female with a left knee pain 2' severe OA and posterior splint too long.     Plan:  No acute orthopedics intervention indicated at this time.  Recommend Podiatry follow up to trim posterior splint.  Management of 5th metatarsal fracture per Podiatry.  Weight-bearing status per Podiatry; no restrictions from Orthopedics standpoint.  DVT prophylaxis.  Pain control.  Ice application.  RLE elevation.  Follow-up with Dr. Smith within 2-4 weeks of discharge as needed; Call office for appointment.  Orthopedically stable for discharge.    Will discuss case with Dr. Smith and advise if any change in plan.

## 2018-11-30 NOTE — PROGRESS NOTE ADULT - ASSESSMENT
Assessment and Recommendation:   · Assessment		    Assessment:  Patient is a 84 year old female seen by Podiatry for the followin. closed comminuted non-displaced fracture at base of 5th metatarsal bone at the styloid process, left foot  2.Pain in left foot  3. Difficulty walking  4. DM Type 2    Plan:  Patient was seen and examined by Podiatry; treatment and plan were discussed with attending Dr. Kerry Rose.  Amezcua compression and splint left intact at this visit. Surgical shoe noted on patient's left foot while in bed.   Patient advised to wear surgical shoe over the amezcua compression and posterior splint and partial weight bear on the heel with concommiant use of a walker at all times when ambulating.   Patient to wear a heel protector on the right side.   PT consult placed for dispensing walker and training patient to use walker while patient is partial weight bearing on the left heel with surgical shoe over the amezcua compression and splint on the left side at all times when ambulating.   Care by medicine appreciated.  Patient advised to practice rest, ice, elevation and compression of the left lower extrremity.  Patient advised he has to keep splint clean dry, and intact at all times.  Instructed to patient if she continues to walk on left foot the fracture may risk the possibilty of further fracturing of the bone and/or displacement of the bone and further eccentric pull of the peoreus brevis tendon, all of which are possibilities. Patient advised of the possibility of surgery in the future for treatment if his fracture does not heal appropriately.   Patient demonstrated verbal understanding of all interventions at this time.   Podiatry will follow patient while in house.

## 2018-11-30 NOTE — PHYSICAL THERAPY INITIAL EVALUATION ADULT - ACTIVE RANGE OF MOTION EXAMINATION, REHAB EVAL
no Active ROM deficits were identified/except L LE: hip/knee flex 40/40; ankle limited due to splint; R shld FE 90; L shld FE 80; Note: pt c/o pain with L knee ROM / posteior palpation; CARO Storm made aware

## 2018-11-30 NOTE — PROGRESS NOTE ADULT - SUBJECTIVE AND OBJECTIVE BOX
Date of Service: 11/30/18  HPI: 84 year old female patient history significant for Diabetes Mellitus Type II was seen by Podiatry in the ED on 11/29/18 with the cc of left foot pain. Patient's daughter accompanies patient for the duration of the patient's visit. According to patient, she tripped and fell at 12:30 PM on 11/29/18  when she went to  her phone, Patient states she hit her head and landed on her left foot. Patient states she began to have left foot pain, pointing to the outer side of his left foot as the area of maximal pain. Patient also admits to left knee pain. Patient denies loss of consciousness from the fall. Patient denies left ankle pain and denies any trauma to the right foot and right ankle. Patient rates the pain a 9 out of 10 on the Visual Analog scale. Patient states she is having pain and difficulty walking on the left foot after the injury. Patient     11/30: Patient seen by Podiatry. Patient found to be resting in bed in NAD. Patient states the splint applied to her left lower extremity does not feel too tight. Patient found to be wearing surgical shoe over splint on left side while in bed. Patient denies any overnight events and states the splint has stayed clean, dry, and intact. Patient states her left foot is less painful than on 11/29. Patient denies any f/n/v/c/sob/cp/ab pain at this time.     Allergies  codeine, Darvon  PMHx and PSurgHx:   PMH HTN, HLD, DM2, CAD s/p stent 10 yr ago, right breast cancer s/p mastectomy 27 yr ago  SOCIAL HISTORY:  patient denies tobacco, alcohol, and recreational drug use.    Review of Systems: Patient denies f/n/v/c/sob/ab pain at this time.       Physical Exam Lower Extremity Focused:  Amezcua compression and posterior splint left intact at this visit. Splint is noted to be clean, dry, and intact. Exposed toes present left side with capillary refill time noted to be less than 3 seconds x 5. Patient able to demonstrate wiggling of toes. Toes are warm to touch and appear well perfused. Sensation intact to toes x 10.    RADIOLOGY/EKG:  < from: Xray Foot AP + Lateral + Oblique, Left (11.29.18 @ 14:49) >    IMPRESSION:   nondisplaced transverse fracture through the base of the   fifth metacarpal. Small plantar calcaneal spur.     < end of copied text >  < from: Xray Pelvis AP only (11.29.18 @ 14:50) >  IMPRESSION:   Unremarkable radiographs of the pelvis.         < end of copied text >    < from: CT Cervical Spine No Cont (11.29.18 @ 14:38) >  IMPRESSION:   No vertebral fracture is recognized.    < end of copied text >    < from: CT Head No Cont (11.29.18 @ 14:31) >  IMPRESSION:   mild periventricular white matter ischemia.   Global   atrophy.      < end of copied text >    Xray Knee:   < from: Xray Knee 3 Views, Left (11.29.18 @ 16:38) >    IMPRESSION:   Moderate tricompartmental osteoarthritis.     < end of copied text >      LABS: All Labs Reviewed:                        11.6   7.65  )-----------( 176      ( 30 Nov 2018 06:58 )             35.6     11-30    143  |  107  |  16  ----------------------------<  141<H>  4.0   |  26  |  0.61    Ca    9.1      30 Nov 2018 06:58      Vital Signs Last 24 Hrs  T(C): 36.7 (30 Nov 2018 05:34), Max: 36.8 (29 Nov 2018 13:58)  T(F): 98.1 (30 Nov 2018 05:34), Max: 98.3 (29 Nov 2018 13:58)  HR: 76 (30 Nov 2018 05:34) (75 - 87)  BP: 117/48 (30 Nov 2018 05:34) (117/48 - 157/53)  BP(mean): --  RR: 18 (30 Nov 2018 05:34) (15 - 18)  SpO2: 98% (30 Nov 2018 05:34) (98% - 100%)    MEDICATIONS:  MEDICATIONS  (STANDING):  aspirin  chewable 81 milliGRAM(s) Oral daily  atorvastatin 80 milliGRAM(s) Oral at bedtime  dextrose 5%. 1000 milliLiter(s) (50 mL/Hr) IV Continuous <Continuous>  dextrose 50% Injectable 12.5 Gram(s) IV Push once  dextrose 50% Injectable 25 Gram(s) IV Push once  dextrose 50% Injectable 25 Gram(s) IV Push once  heparin  Injectable 5000 Unit(s) SubCutaneous every 12 hours  insulin lispro (HumaLOG) corrective regimen sliding scale   SubCutaneous three times a day before meals  insulin lispro (HumaLOG) corrective regimen sliding scale   SubCutaneous at bedtime  losartan 25 milliGRAM(s) Oral daily  sodium chloride 0.9%. 1000 milliLiter(s) (100 mL/Hr) IV Continuous <Continuous>        I&O's Summary      CAPILLARY BLOOD GLUCOSE      POCT Blood Glucose.: 150 mg/dL (30 Nov 2018 08:04)  POCT Blood Glucose.: 159 mg/dL (29 Nov 2018 23:30)

## 2018-11-30 NOTE — PROGRESS NOTE ADULT - SUBJECTIVE AND OBJECTIVE BOX
c/c: fall, left knee/left foot pain    HPI:  84 y.o. female with PMH HTN, HLD, DM2, CAD s/p stent 10 yr ago, right breast cancer s/p mastectomy 27 yr ago presents with s/p fall. Pt reports walking to answer the phone when she tripped and fell backwards with neck trauma.  She was evaluated in ED and found to have left 5th metatarsal fracture.     11/30: pt seen and examined this am. c/o left knee and left foot pain. No sob/chest apin. No dizziness/lightheadedness/n/v/d/abd pain. No f/c/r.      Review of system- All 10 systems reviewed and is as per HPI otherwise negative.     VITALS  T(C): 36.8 (11-30-18 @ 11:01), Max: 36.8 (11-29-18 @ 13:58)  HR: 86 (11-30-18 @ 11:01) (75 - 87)  BP: 148/42 (11-30-18 @ 11:01) (117/48 - 157/53)  RR: 16 (11-30-18 @ 11:01) (15 - 18)  SpO2: 96% (11-30-18 @ 11:01) (96% - 100%)      PHYSICAL EXAM:    GENERAL: Comfortable, no acute distress  HEAD:  Atraumatic, Normocephalic  EYES: EOMI, PERRLA  HEENT: Moist mucous membranes  NECK: Supple, No JVD  NERVOUS SYSTEM:  Alert & Oriented X3,non focal  CHEST/LUNG: Clear to auscultation bilaterally  HEART: Regular rate and rhythm  ABDOMEN: Soft, Nontender, Nondistended; Bowel sounds present  GENITOURINARY- Voiding, no palpable bladder  EXTREMITIES:  No clubbing, cyanosis. LLE in ace-wrap  MUSCULOSKELETAL- Pain left knee with movement.  SKIN-no rash        LABS:                        11.6   7.65  )-----------( 176      ( 30 Nov 2018 06:58 )             35.6     11-30    143  |  107  |  16  ----------------------------<  141<H>  4.0   |  26  |  0.61    Ca    9.1      30 Nov 2018 06:58          MEDS  acetaminophen   Tablet .. 650 milliGRAM(s) Oral every 6 hours PRN  aspirin  chewable 81 milliGRAM(s) Oral daily  atorvastatin 80 milliGRAM(s) Oral at bedtime  dextrose 40% Gel 15 Gram(s) Oral once PRN  dextrose 5%. 1000 milliLiter(s) IV Continuous <Continuous>  dextrose 50% Injectable 12.5 Gram(s) IV Push once  dextrose 50% Injectable 25 Gram(s) IV Push once  dextrose 50% Injectable 25 Gram(s) IV Push once  glucagon  Injectable 1 milliGRAM(s) IntraMuscular once PRN  heparin  Injectable 5000 Unit(s) SubCutaneous every 12 hours  insulin lispro (HumaLOG) corrective regimen sliding scale   SubCutaneous three times a day before meals  insulin lispro (HumaLOG) corrective regimen sliding scale   SubCutaneous at bedtime  losartan 25 milliGRAM(s) Oral daily  sodium chloride 0.9%. 1000 milliLiter(s) IV Continuous <Continuous>    ASSESSMENT AND PLAN:  84 y.o. female with PMH HTN, HLD, DM2, CAD s/p stent 10 yr ago, right breast cancer s/p mastectomy 27 yr ago presents with s/p fall A/W:    #s/p fall, mechanical by history  #left nondisplaced transverse fracture of 5th metatarsal bone  #difficulty ambulating  -podiatry eval appreciated  -s/p guillen compression and splint with surgical shoe  -ortho eval for left knee pain/buckling while working with physical therapy, no fx noted on xray  #DM2  -hold metformin and januvia  -fingerstick monitoring and ISS    #HTN  -continue arb    # HLD  -statin    #CAD s/p stent  -on ASA and statin    #DVT ppx  -heparin SC      will d/w pt re: home meds

## 2018-12-01 LAB
GLUCOSE BLDC GLUCOMTR-MCNC: 153 MG/DL — HIGH (ref 70–99)
GLUCOSE BLDC GLUCOMTR-MCNC: 161 MG/DL — HIGH (ref 70–99)
GLUCOSE BLDC GLUCOMTR-MCNC: 173 MG/DL — HIGH (ref 70–99)
GLUCOSE BLDC GLUCOMTR-MCNC: 233 MG/DL — HIGH (ref 70–99)

## 2018-12-01 RX ADMIN — HEPARIN SODIUM 5000 UNIT(S): 5000 INJECTION INTRAVENOUS; SUBCUTANEOUS at 18:42

## 2018-12-01 RX ADMIN — Medication 1: at 12:12

## 2018-12-01 RX ADMIN — Medication 2: at 18:57

## 2018-12-01 RX ADMIN — Medication 1: at 08:03

## 2018-12-01 RX ADMIN — Medication 81 MILLIGRAM(S): at 12:12

## 2018-12-01 RX ADMIN — ATORVASTATIN CALCIUM 80 MILLIGRAM(S): 80 TABLET, FILM COATED ORAL at 23:24

## 2018-12-01 RX ADMIN — Medication 650 MILLIGRAM(S): at 06:10

## 2018-12-01 RX ADMIN — HEPARIN SODIUM 5000 UNIT(S): 5000 INJECTION INTRAVENOUS; SUBCUTANEOUS at 06:10

## 2018-12-01 RX ADMIN — Medication 650 MILLIGRAM(S): at 18:40

## 2018-12-01 RX ADMIN — Medication 650 MILLIGRAM(S): at 06:48

## 2018-12-01 RX ADMIN — LOSARTAN POTASSIUM 25 MILLIGRAM(S): 100 TABLET, FILM COATED ORAL at 06:10

## 2018-12-01 NOTE — PROGRESS NOTE ADULT - ASSESSMENT
Patient is a 84 year old female seen by Podiatry for the followin. Closed comminuted non-displaced fracture at base of 5th metatarsal bone at the styloid process, left foot  2. Pain in left foot  3. Difficulty walking  4. DM Type 2    Patient was seen and examined by Podiatry; treatment and plan were discussed with attending Dr. Kerry Rose.  Amezcua compression and splint left intact at this visit. Surgical shoe noted on patient's left foot.   Patient advised to wear surgical shoe over the amezcua compression and posterior splint and partial weight bear on the heel with concommiant use of a walker at all times when ambulating.   Patient to wear a heel protector on the right side.   PT evaluation noted and appreciated. Recommended rehab.  Patient advised to practice rest, ice, elevation and compression of the left lower extrremity.  Patient advised he has to keep splint clean dry, and intact at all times.  Instructed to patient if she continues to walk on left foot the fracture may risk the possibility of further fracturing of the bone and/or displacement of the bone and further eccentric pull of the peoreus brevis tendon, all of which are possibilities. Patient advised of the possibility of surgery in the future for treatment if fracture does not heal appropriately.   Patient demonstrated verbal understanding of all interventions at this time.   Care by Medicine appreciated.  Podiatry will follow patient while in house.

## 2018-12-01 NOTE — PROGRESS NOTE ADULT - SUBJECTIVE AND OBJECTIVE BOX
c/c: fall, left knee/left foot pain    HPI:  84 y.o. female with PMH HTN, HLD, DM2, CAD s/p stent 10 yr ago, right breast cancer s/p mastectomy 27 yr ago presents with s/p fall. Pt reports walking to answer the phone when she tripped and fell backwards with neck trauma.  She was evaluated in ED and found to have left 5th metatarsal fracture.   Seen by podiatry,s/p guillen compression and splint placement and recommended surgical shoe.    12/1: pt seen and examined this am. Pain better today. No sob/chest pain. Planned for rehab tomorrow.     Review of system- All 10 systems reviewed and is as per HPI otherwise negative.     Vital Signs Last 24 Hrs  T(C): 36.5 (01 Dec 2018 04:20), Max: 36.6 (30 Nov 2018 17:29)  T(F): 97.7 (01 Dec 2018 04:20), Max: 97.9 (30 Nov 2018 17:29)  HR: 74 (01 Dec 2018 04:20) (74 - 81)  BP: 141/57 (01 Dec 2018 04:20) (139/59 - 141/57)  RR: 16 (01 Dec 2018 04:20) (16 - 16)  SpO2: 99% (01 Dec 2018 04:20) (99% - 99%)    PHYSICAL EXAM:    GENERAL: Comfortable, no acute distress  HEAD:  Atraumatic, Normocephalic  EYES: EOMI, PERRLA  HEENT: Moist mucous membranes  NECK: Supple, No JVD  NERVOUS SYSTEM:  Alert & Oriented X3,non focal  CHEST/LUNG: Clear to auscultation bilaterally  HEART: Regular rate and rhythm  ABDOMEN: Soft, Nontender, Nondistended; Bowel sounds present  GENITOURINARY- Voiding, no palpable bladder  EXTREMITIES:  No clubbing, cyanosis. LLE in ace-wrap  MUSCULOSKELETAL- LLE in ace-wrap/splint, knee pain improved  SKIN-no rash      LABS:                        11.6   7.65  )-----------( 176      ( 30 Nov 2018 06:58 )             35.6     11-30    143  |  107  |  16  ----------------------------<  141<H>  4.0   |  26  |  0.61    Ca    9.1      30 Nov 2018 06:58          MEDS  acetaminophen   Tablet .. 650 milliGRAM(s) Oral every 6 hours PRN  aspirin  chewable 81 milliGRAM(s) Oral daily  atorvastatin 80 milliGRAM(s) Oral at bedtime  dextrose 40% Gel 15 Gram(s) Oral once PRN  dextrose 5%. 1000 milliLiter(s) IV Continuous <Continuous>  dextrose 50% Injectable 12.5 Gram(s) IV Push once  dextrose 50% Injectable 25 Gram(s) IV Push once  dextrose 50% Injectable 25 Gram(s) IV Push once  glucagon  Injectable 1 milliGRAM(s) IntraMuscular once PRN  heparin  Injectable 5000 Unit(s) SubCutaneous every 12 hours  insulin lispro (HumaLOG) corrective regimen sliding scale   SubCutaneous three times a day before meals  insulin lispro (HumaLOG) corrective regimen sliding scale   SubCutaneous at bedtime  losartan 25 milliGRAM(s) Oral daily  sodium chloride 0.9%. 1000 milliLiter(s) IV Continuous <Continuous>    ASSESSMENT AND PLAN:  84 y.o. female with PMH HTN, HLD, DM2, CAD s/p stent 10 yr ago, right breast cancer s/p mastectomy 27 yr ago presents with s/p fall A/W:    #s/p fall, mechanical by history  #left nondisplaced transverse fracture of 5th metatarsal bone  #difficulty ambulating  -podiatry eval appreciated  -s/p guillen compression and splint with surgical shoe  -ortho eval for left knee pain/buckling while working with physical therapy, no fx noted on xray  #DM2  -hold metformin and januvia  -fingerstick monitoring and ISS    #HTN  -continue arb    # HLD  -statin    #CAD s/p stent  -on ASA and statin    #DVT ppx  -heparin SC    #DIspo:  rehab tomorrow

## 2018-12-01 NOTE — PROGRESS NOTE ADULT - SUBJECTIVE AND OBJECTIVE BOX
Date of Service: 12-01-18 @ 10:55    84 year old female patient was seen initially by Podiatry in the ED on 11/29/18 with the cc of left foot pain. Patient's daughter accompanied patient for the duration of the patient's visit. According to patient, she tripped and fell at 12:30 PM on 11/29/18 when she went to  her phone, Patient stated she hit her head and landed on her left foot. Patient stated she began to have left foot pain, pointing to the outer side of his left foot as the area of maximal pain. Patient also admitted to left knee pain. Patient denied loss of consciousness from the fall. Patient denies left ankle pain and denies any trauma to the right foot and right ankle. Patient rated the pain a 9 out of 10 on the Visual Analog scale. Patient stated she is having pain and difficulty walking on the left foot after the injury.    12/1: Patient was seen for followup of left foot fracture. Patient is resting comfortably in bedside chair and is in NAD. Pt states the posterior splint feels fine on her left lower leg and foot. Pt states she had a physical therapy session this morning that went well. Pt states she has some pain in her left lower leg. Pt states she is going to be discharged to rehab tomorrow. Patient denies any f/n/v/c/sob/cp/ab pain at this time.   PMH/PSH: HTN, HLD, DM2, CAD s/p stent 10 yr ago, right breast cancer s/p mastectomy 27 yr ago  MEDICATIONS:  MEDICATIONS  (STANDING):  aspirin  chewable 81 milliGRAM(s) Oral daily  atorvastatin 80 milliGRAM(s) Oral at bedtime  dextrose 5%. 1000 milliLiter(s) (50 mL/Hr) IV Continuous <Continuous>  dextrose 50% Injectable 12.5 Gram(s) IV Push once  dextrose 50% Injectable 25 Gram(s) IV Push once  dextrose 50% Injectable 25 Gram(s) IV Push once  heparin  Injectable 5000 Unit(s) SubCutaneous every 12 hours  insulin lispro (HumaLOG) corrective regimen sliding scale   SubCutaneous three times a day before meals  insulin lispro (HumaLOG) corrective regimen sliding scale   SubCutaneous at bedtime  losartan 25 milliGRAM(s) Oral daily  sodium chloride 0.9%. 1000 milliLiter(s) (100 mL/Hr) IV Continuous <Continuous>    Allergies: Codeine, Darvon    Vital Signs Last 24 Hrs  T(C): 36.5 (01 Dec 2018 04:20), Max: 36.8 (30 Nov 2018 11:01)  T(F): 97.7 (01 Dec 2018 04:20), Max: 98.3 (30 Nov 2018 11:01)  HR: 74 (01 Dec 2018 04:20) (74 - 86)  BP: 141/57 (01 Dec 2018 04:20) (139/59 - 148/42)  BP(mean): --  RR: 16 (01 Dec 2018 04:20) (16 - 16)  SpO2: 99% (01 Dec 2018 04:20) (96% - 99%)    POCT Blood Glucose.: 161 mg/dL (01 Dec 2018 07:43)  POCT Blood Glucose.: 182 mg/dL (30 Nov 2018 21:26)  POCT Blood Glucose.: 117 mg/dL (30 Nov 2018 17:46)  POCT Blood Glucose.: 210 mg/dL (30 Nov 2018 12:16)    PHYSICAL EXAM:  Constitutional: NAD, awake and alert  Posterior splint is noted to be clean, dry, and intact. Left foot digits are noted to be pink, warm, well perfused with CFT < 3 sec x5. Active motion of the left foot digits. Sensation intact to toes x 10.    LABS: All Labs Reviewed:                        11.6   7.65  )-----------( 176      ( 30 Nov 2018 06:58 )             35.6     11-30    143  |  107  |  16  ----------------------------<  141<H>  4.0   |  26  |  0.61    Ca    9.1      30 Nov 2018 06:58    RADIOLOGY/EKG:  EXAM:  XR FOOT COMP MIN 3 VIEWS LT                          PROCEDURE DATE:  11/29/2018      INTERPRETATION:      Radiographs of the LEFT foot         CLINICAL INFORMATION:      Foot pain.    TECHNIQUE:  Frontal, oblique and lateral views of the foot were obtained.    FINDINGS:   No prior similar studies are available for review.    Osteoporosis is noted.    The forefoot demonstrates intact osseous structures without fracture or   bone lesion.  Alignment is maintained.  Joint spaces are preserved.  The   sesamoids appear intact.  No soft tissue abnormality is found.    The midfoot demonstrates a nondisplaced transverse fracture through the   base of the fifth metacarpal.    The hindfoot appears intact. Small plantar spur formation is recognized.    The soft tissues appear intact.    IMPRESSION:   nondisplaced transverse fracture through the base of the   fifth metacarpal. Small plantar calcaneal spur.

## 2018-12-02 ENCOUNTER — TRANSCRIPTION ENCOUNTER (OUTPATIENT)
Age: 83
End: 2018-12-02

## 2018-12-02 VITALS
TEMPERATURE: 98 F | HEART RATE: 82 BPM | OXYGEN SATURATION: 97 % | RESPIRATION RATE: 17 BRPM | DIASTOLIC BLOOD PRESSURE: 57 MMHG | SYSTOLIC BLOOD PRESSURE: 128 MMHG

## 2018-12-02 LAB
GLUCOSE BLDC GLUCOMTR-MCNC: 170 MG/DL — HIGH (ref 70–99)
GLUCOSE BLDC GLUCOMTR-MCNC: 218 MG/DL — HIGH (ref 70–99)

## 2018-12-02 RX ORDER — ACETAMINOPHEN 500 MG
2 TABLET ORAL
Qty: 0 | Refills: 0 | DISCHARGE
Start: 2018-12-02

## 2018-12-02 RX ORDER — LOSARTAN POTASSIUM 100 MG/1
1 TABLET, FILM COATED ORAL
Qty: 0 | Refills: 0 | DISCHARGE
Start: 2018-12-02

## 2018-12-02 RX ORDER — LOSARTAN POTASSIUM 100 MG/1
1 TABLET, FILM COATED ORAL
Qty: 0 | Refills: 0 | COMMUNITY

## 2018-12-02 RX ADMIN — Medication 650 MILLIGRAM(S): at 07:22

## 2018-12-02 RX ADMIN — Medication 1: at 08:42

## 2018-12-02 RX ADMIN — Medication 2: at 11:40

## 2018-12-02 RX ADMIN — LOSARTAN POTASSIUM 25 MILLIGRAM(S): 100 TABLET, FILM COATED ORAL at 06:53

## 2018-12-02 RX ADMIN — Medication 81 MILLIGRAM(S): at 11:41

## 2018-12-02 RX ADMIN — Medication 650 MILLIGRAM(S): at 06:52

## 2018-12-02 RX ADMIN — HEPARIN SODIUM 5000 UNIT(S): 5000 INJECTION INTRAVENOUS; SUBCUTANEOUS at 06:53

## 2018-12-02 NOTE — DISCHARGE NOTE ADULT - CARE PLAN
Principal Discharge DX:	Closed nondisplaced fracture of fifth metatarsal bone of left foot, initial encounter  Goal:	healing  Assessment and plan of treatment:	physical therapy at rehab.  follow up with podiatry as outpt.

## 2018-12-02 NOTE — DISCHARGE NOTE ADULT - HOSPITAL COURSE
84 y.o. female with PMH HTN, HLD, DM2, CAD s/p stent 10 yr ago, right breast cancer s/p mastectomy 27 yr ago presents with s/p fall. Pt reports walking to answer the phone when she tripped and fell backwards with neck trauma.  She was evaluated in ED and found to have left 5th metatarsal fracture.   Seen by podiatry and is now s/p guillen compression and splint placement and recommended surgical shoe. She was seen by physical therapy and recommended rehab. She will be discharged to rehab today    Vital Signs Last 24 Hrs  T(C): 36.7 (02 Dec 2018 05:58), Max: 36.7 (02 Dec 2018 05:58)  T(F): 98.1 (02 Dec 2018 05:58), Max: 98.1 (02 Dec 2018 05:58)  HR: 78 (02 Dec 2018 05:58) (76 - 87)  BP: 113/42 (02 Dec 2018 05:58) (113/42 - 128/42)  RR: 16 (02 Dec 2018 05:58) (16 - 16)  SpO2: 97% (02 Dec 2018 05:58) (96% - 97%)      PHYSICAL EXAM:    GENERAL: Comfortable, no acute distress  HEAD:  Atraumatic, Normocephalic  EYES: EOMI, PERRLA  HEENT: Moist mucous membranes  NECK: Supple, No JVD  NERVOUS SYSTEM:  Alert & Oriented X3,non focal  CHEST/LUNG: Clear to auscultation bilaterally  HEART: Regular rate and rhythm  ABDOMEN: Soft, Nontender, Nondistended; Bowel sounds present  GENITOURINARY- Voiding, no palpable bladder  EXTREMITIES:  No clubbing, cyanosis. LLE in ace-wrap  MUSCULOSKELETAL- LLE in ace-wrap/splint, knee pain improved  SKIN-no rash    LABS:    Complete Blood Count + Automated Diff (11.30.18 @ 06:58)    WBC Count: 7.65 K/uL    RBC Count: 4.04 M/uL    Hemoglobin: 11.6 g/dL    Hematocrit: 35.6 %    Mean Cell Volume: 88.1 fl    Mean Cell Hemoglobin: 28.7 pg    Mean Cell Hemoglobin Conc: 32.6 gm/dL    Red Cell Distrib Width: 13.6 %    Platelet Count - Automated: 176 K/uL    Basic Metabolic Panel (11.30.18 @ 06:58)    Sodium, Serum: 143 mmol/L    Potassium, Serum: 4.0 mmol/L    Chloride, Serum: 107 mmol/L    Carbon Dioxide, Serum: 26 mmol/L    Anion Gap, Serum: 10 mmol/L    Blood Urea Nitrogen, Serum: 16 mg/dL    Creatinine, Serum: 0.61 mg/dL    Glucose, Serum: 141 mg/dL    Calcium, Total Serum: 9.1 mg/dL  Xray Foot AP + Lateral + Oblique, Left (11.29.18 @ 14:49) >  IMPRESSION:   nondisplaced transverse fracture through the base of the   fifth metacarpal. Small plantar calcaneal spur.          Xray Knee 3 Views, Left (11.29.18 @ 16:38) >  IMPRESSION:   Moderate tricompartmental osteoarthritis.    FINAL DIAGNOSIS  #s/p fall, mechanical by history  #left nondisplaced transverse fracture of 5th metatarsal bone  -s/p guillen compression and splint with surgical shoe  #DM2  #HTN  # HLD  #CAD s/p stent    Time taken for dc 60 min  plan d/w patient, left message for pcp

## 2018-12-02 NOTE — PROGRESS NOTE ADULT - SUBJECTIVE AND OBJECTIVE BOX
Date of Service: 12-02-18 @ 12:42    84 year old female patient was seen initially by Podiatry in the ED on 11/29/18 with the cc of left foot pain. Patient's daughter accompanied patient for the duration of the patient's visit. According to patient, she tripped and fell at 12:30 PM on 11/29/18 when she went to  her phone, Patient stated she hit her head and landed on her left foot. Patient stated she began to have left foot pain, pointing to the outer side of his left foot as the area of maximal pain. Patient also admitted to left knee pain. Patient denied loss of consciousness from the fall. Patient denies left ankle pain and denies any trauma to the right foot and right ankle. Patient rated the pain a 9 out of 10 on the Visual Analog scale. Patient stated she is having pain and difficulty walking on the left foot after the injury.    12/2: Patient was seen for followup of left foot fracture. Patient is resting comfortably in bedr and is in NAD. Pt states she has pain behind her left knee and states her dressing is digging into the back of left knee. Pt denies any other pedal complaints.  Pt states she is going to be discharged to rehab tday. Patient denies any f/n/v/c/sob/cp/ab pain at this time.   PMH/PSH: HTN, HLD, DM2, CAD s/p stent 10 yr ago, right breast cancer s/p mastectomy 27 yr ago  MEDICATIONS:  MEDICATIONS  (STANDING):  aspirin  chewable 81 milliGRAM(s) Oral daily  atorvastatin 80 milliGRAM(s) Oral at bedtime  dextrose 5%. 1000 milliLiter(s) (50 mL/Hr) IV Continuous <Continuous>  dextrose 50% Injectable 12.5 Gram(s) IV Push once  dextrose 50% Injectable 25 Gram(s) IV Push once  dextrose 50% Injectable 25 Gram(s) IV Push once  heparin  Injectable 5000 Unit(s) SubCutaneous every 12 hours  insulin lispro (HumaLOG) corrective regimen sliding scale   SubCutaneous three times a day before meals  insulin lispro (HumaLOG) corrective regimen sliding scale   SubCutaneous at bedtime  losartan 25 milliGRAM(s) Oral daily  sodium chloride 0.9%. 1000 milliLiter(s) (100 mL/Hr) IV Continuous <Continuous>    Allergies: Codeine, Darvon    Vital Signs Last 24 Hrs  T(C): 36.6 (02 Dec 2018 11:29), Max: 36.7 (02 Dec 2018 05:58)  T(F): 97.8 (02 Dec 2018 11:29), Max: 98.1 (02 Dec 2018 05:58)  HR: 82 (02 Dec 2018 11:29) (76 - 87)  BP: 128/57 (02 Dec 2018 11:29) (113/42 - 128/57)  BP(mean): --  RR: 17 (02 Dec 2018 11:29) (16 - 17)  SpO2: 97% (02 Dec 2018 11:29) (96% - 97%)    POCT Blood Glucose.: 218 mg/dL (02 Dec 2018 11:38)  POCT Blood Glucose.: 170 mg/dL (02 Dec 2018 08:39)  POCT Blood Glucose.: 153 mg/dL (01 Dec 2018 23:22)  POCT Blood Glucose.: 233 mg/dL (01 Dec 2018 16:21)    PHYSICAL EXAM:  Constitutional: NAD, awake and alert  Extremities:   Derm-   Left foot: Posterior splint noted to be clean, dry an intact. Amezcua compression noted to end posterior to the left knee. Upon removal of posterior splint and Amezcua compression, pain relief noted. Swelling noted to be decreased of the LLE. No erythema. No open lesions. No acute signs of infection.  Right foot: No open lesions. No erythema. No acute signs of infection.  Vascular- Pedal pulses are palpable. CFT <3 sec x10. TG: WNL. Mild edema of the left foot.  Neuro- Light touch sensation intact B/L.  Ortho- Mild tenderness noted upon palpation of the left midfoot. Mild tenderness noted upon palpation of the posterior left knee that is noted to be releived with removal of the splint and amezcua compression. Compartments of B/L feet and lower legs are soft and nontender. Nedgative Amparo/Homans signs.    LABS: All Labs Reviewed:  Complete Blood Count + Automated Diff (11.30.18 @ 06:58)    WBC Count: 7.65 K/uL    RBC Count: 4.04 M/uL    Hemoglobin: 11.6 g/dL    Hematocrit: 35.6 %    Mean Cell Volume: 88.1 fl    Mean Cell Hemoglobin: 28.7 pg    Mean Cell Hemoglobin Conc: 32.6 gm/dL    Red Cell Distrib Width: 13.6 %    Platelet Count - Automated: 176 K/uL    Auto Neutrophil #: 5.23 K/uL    Auto Lymphocyte #: 1.47 K/uL    Auto Monocyte #: 0.71 K/uL    Auto Eosinophil #: 0.12 K/uL    Auto Basophil #: 0.05 K/uL    Auto Neutrophil %: 68.3: Differential percentages must be correlated with absolute numbers for  clinical significance. %    Auto Lymphocyte %: 19.2 %    Auto Monocyte %: 9.3 %    Auto Eosinophil %: 1.6 %    Auto Basophil %: 0.7 %    Auto Immature Granulocyte %: 0.9 %    Basic Metabolic Panel (11.30.18 @ 06:58)    Sodium, Serum: 143 mmol/L    Potassium, Serum: 4.0 mmol/L    Chloride, Serum: 107 mmol/L    Carbon Dioxide, Serum: 26 mmol/L    Anion Gap, Serum: 10 mmol/L    Blood Urea Nitrogen, Serum: 16 mg/dL    Creatinine, Serum: 0.61 mg/dL    Glucose, Serum: 141 mg/dL    Calcium, Total Serum: 9.1 mg/dL    eGFR if Non : 83: Interpretative comment  The units for eGFR are ml/min/1.73m2 (normalized body surface area). The  eGFR is calculated from a serum creatinine using the CKD-EPI equation.  Other variables required for calculation are race, age and sex. Among  patients with chronic kidney disease (CKD), the eGFR is useful in  determining the stage of disease according to KDOQI CKD classification.  All eGFR results are reported numerically with the following  interpretation.          GFR                    With                 Without     (ml/min/1.73 m2)    Kidney Damage       Kidney Damage        >= 90                    Stage 1                     Normal        60-89                    Stage 2                     Decreased GFR        30-59     Stage 3                     Stage 3        15-29                    Stage 4                     Stage 4        < 15                      Stage 5                     Stage 5  Each stage of CKD assumes that the associated GFR level has been in  effect for at least 3 months. Determination of stages one and two (with  eGFR > 59 ml/min/m2) requires estimation of kidney damage for at least 3  months as defined by structural or functional abnormalities.  Limitations: All estimates of GFR will be less accurate for patients at  extremes of muscle mass (including but not limited to frail elderly,  critically ill, or cancer patients), those with unusual diets, and those  with conditions associated with reduced secretion or extrarenal  elimination of creatinine. The eGFR equation is not recommended for use  in patients with unstable creatinine levels. mL/min/1.73M2    eGFR if African American: 96 mL/min/1.73M2    RADIOLOGY/EKG:    EXAM:  XR FOOT COMP MIN 3 VIEWS LT                          PROCEDURE DATE:  11/29/2018      INTERPRETATION:      Radiographs of the LEFT foot         CLINICAL INFORMATION:      Foot pain.    TECHNIQUE:  Frontal, oblique and lateral views of the foot were obtained.    FINDINGS:   No prior similar studies are available for review.    Osteoporosis is noted.    The forefoot demonstrates intact osseous structures without fracture or   bone lesion.  Alignment is maintained.  Joint spaces are preserved.  The   sesamoids appear intact.  No soft tissue abnormality is found.    The midfoot demonstrates a nondisplaced transverse fracture through the   base of the fifth metacarpal.    The hindfoot appears intact. Small plantar spur formation is recognized.    The soft tissues appear intact.    IMPRESSION:   nondisplaced transverse fracture through the base of the   fifth metacarpal. Small plantar calcaneal spur.

## 2018-12-02 NOTE — DISCHARGE NOTE ADULT - PATIENT PORTAL LINK FT
You can access the [facility] Patient Portal, offered by [facility], by registering with the following website: [email or web address]

## 2018-12-02 NOTE — DISCHARGE NOTE ADULT - ADDITIONAL INSTRUCTIONS
Patient advised to wear surgical shoe over the guillen compression and posterior splint and partial weight bear on the heel with concommiant use of a walker at all times when ambulating.   Patient to wear a heel protector on the right side.

## 2018-12-02 NOTE — DISCHARGE NOTE ADULT - CARE PROVIDER_API CALL
Kerry Rose (LINDA), Podiatric Medicine and Surgery  [address]  [address]  [place] [address]  Phone: [phone]  Fax: [phone]

## 2018-12-02 NOTE — DISCHARGE NOTE ADULT - CONDITIONS AT DISCHARGE
Call MD for any increase in pain, numbness, tingling; fever over 101F; swelling, redness, pain in calf.

## 2018-12-02 NOTE — PROGRESS NOTE ADULT - ASSESSMENT
Patient is a 84 year old female seen by Podiatry for the followin. Closed comminuted non-displaced fracture at base of 5th metatarsal bone at the styloid process, left foot  2. Pain in left foot  3. Difficulty walking  4. DM Type 2    Patient was seen and examined by Podiatry; treatment and plan were discussed with attending Dr. Kerry Rose.  Amezcua compression and posterior splint removed from the LLE. Pain relief noted.  Amezcua compression and posterior splint reapplied to the LLE, taking extra care to avoid the left popliteal fossa. CFT noted to be immediate to all digits of the left foot post splint application. Pt satisfied with new Kevin compression and posterior splint on the LLE.  Patient advised to wear surgical shoe over the amezcua compression and posterior splint and partial weight bear on the heel with concommiant use of a walker at all times when ambulating.   Patient to wear a heel protector on the right side.   PT evaluation noted and appreciated. Recommended rehab.  Patient advised to practice rest, ice, elevation and compression of the left lower extremity.  Patient advised he has to keep splint clean dry, and intact at all times.  Instructed to patient if she continues to walk on left foot the fracture may risk the possibility of further fracturing of the bone and/or displacement of the bone and further eccentric pull of the peoreus brevis tendon, all of which are possibilities. Patient advised of the possibility of surgery in the future for treatment if fracture does not heal appropriately.   Patient demonstrated verbal understanding of all interventions at this time.   Care by Medicine appreciated.  Podiatry will follow patient while in house. Patient is a 84 year old female seen by Podiatry for the followin. Closed comminuted non-displaced fracture at base of 5th metatarsal bone at the styloid process, left foot  2. Pain in left foot  3. Difficulty walking  4. DM Type 2    Patient was seen and examined by Podiatry; treatment and plan were discussed with attending Dr. Kerry Rose.  Amezcua compression and posterior splint removed from the LLE. Pain relief noted.  Amezcua compression and posterior splint reapplied to the LLE, taking extra care to avoid the left popliteal fossa. CFT noted to be immediate to all digits of the left foot post splint application. Pt satisfied with new Kevin compression and posterior splint on the LLE.  Patient advised to wear surgical shoe over the amezcua compression and posterior splint and partial weight bear on the heel with concommiant use of a walker at all times when ambulating.   Patient to wear a heel protector on the right side.   PT evaluation noted and appreciated. Recommended rehab.  Patient advised to practice rest, ice, elevation and compression of the left lower extremity.  Patient advised he has to keep splint clean dry, and intact at all times.  Instructed to patient if she continues to walk on left foot the fracture may risk the possibility of further fracturing of the bone and/or displacement of the bone and further eccentric pull of the peoreus brevis tendon, all of which are possibilities. Patient advised of the possibility of surgery in the future for treatment if fracture does not heal appropriately.   Patient demonstrated verbal understanding of all interventions at this time.   Care by Medicine appreciated.  Pt is to followup with Dr. Rose in office in 3-4 weeks for followup care of the left foot.  Podiatry will follow patient while in house.

## 2018-12-02 NOTE — DISCHARGE NOTE ADULT - MEDICATION SUMMARY - MEDICATIONS TO TAKE
I will START or STAY ON the medications listed below when I get home from the hospital:    aspirin 81 mg oral tablet, chewable  -- 1 tab(s) by mouth once a day  -- Indication: For home med    acetaminophen 325 mg oral tablet  -- 2 tab(s) by mouth every 6 hours, As needed, Temp greater or equal to 38C (100.4F), Mild Pain (1 - 3)  -- Indication: For pain    losartan 25 mg oral tablet  -- 1 tab(s) by mouth once a day  -- Indication: For home med    metFORMIN 500 mg oral tablet, extended release  -- 1 tab(s) by mouth 2 times a day  -- Indication: For home med    Januvia 50 mg oral tablet  -- 1 tab(s) by mouth once a day  -- Indication: For teodoro emed    Crestor 20 mg oral tablet  -- 1 tab(s) by mouth once a day (at bedtime)  -- Indication: For home med    Fish Oil  -- Indication: For home med

## 2018-12-05 DIAGNOSIS — E78.5 HYPERLIPIDEMIA, UNSPECIFIED: ICD-10-CM

## 2018-12-05 DIAGNOSIS — E11.9 TYPE 2 DIABETES MELLITUS WITHOUT COMPLICATIONS: ICD-10-CM
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
## 2018-12-05 DIAGNOSIS — Z88.5 ALLERGY STATUS TO NARCOTIC AGENT: ICD-10-CM

## 2018-12-05 DIAGNOSIS — Z85.3 PERSONAL HISTORY OF MALIGNANT NEOPLASM OF BREAST: ICD-10-CM

## 2018-12-05 DIAGNOSIS — I25.10 ATHEROSCLEROTIC HEART DISEASE OF NATIVE CORONARY ARTERY WITHOUT ANGINA PECTORIS: ICD-10-CM

## 2018-12-05 DIAGNOSIS — Z95.5 PRESENCE OF CORONARY ANGIOPLASTY IMPLANT AND GRAFT: ICD-10-CM

## 2018-12-05 DIAGNOSIS — Z79.84 LONG TERM (CURRENT) USE OF ORAL HYPOGLYCEMIC DRUGS: ICD-10-CM

## 2018-12-05 DIAGNOSIS — Z79.899 OTHER LONG TERM (CURRENT) DRUG THERAPY: ICD-10-CM

## 2018-12-05 DIAGNOSIS — I10 ESSENTIAL (PRIMARY) HYPERTENSION: ICD-10-CM

## 2018-12-05 DIAGNOSIS — Z79.82 LONG TERM (CURRENT) USE OF ASPIRIN: ICD-10-CM
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
## 2018-12-05 DIAGNOSIS — S92.355A NONDISPLACED FRACTURE OF FIFTH METATARSAL BONE, LEFT FOOT, INITIAL ENCOUNTER FOR CLOSED FRACTURE: ICD-10-CM

## 2019-08-13 ENCOUNTER — APPOINTMENT (OUTPATIENT)
Dept: SURGICAL ONCOLOGY | Facility: CLINIC | Age: 84
End: 2019-08-13

## 2020-02-19 ENCOUNTER — EMERGENCY (EMERGENCY)
Facility: HOSPITAL | Age: 85
LOS: 1 days | Discharge: HOME | End: 2020-02-19
Attending: EMERGENCY MEDICINE
Payer: MEDICARE

## 2020-02-19 VITALS
TEMPERATURE: 98 F | WEIGHT: 175.05 LBS | RESPIRATION RATE: 18 BRPM | OXYGEN SATURATION: 95 % | SYSTOLIC BLOOD PRESSURE: 176 MMHG | HEART RATE: 96 BPM | HEIGHT: 64 IN | DIASTOLIC BLOOD PRESSURE: 83 MMHG

## 2020-02-19 PROCEDURE — 72125 CT NECK SPINE W/O DYE: CPT | Mod: 26

## 2020-02-19 PROCEDURE — 70450 CT HEAD/BRAIN W/O DYE: CPT | Mod: 26

## 2020-02-19 PROCEDURE — 72125 CT NECK SPINE W/O DYE: CPT

## 2020-02-19 PROCEDURE — 71045 X-RAY EXAM CHEST 1 VIEW: CPT | Mod: 26

## 2020-02-19 PROCEDURE — 99284 EMERGENCY DEPT VISIT MOD MDM: CPT | Mod: GC

## 2020-02-19 PROCEDURE — 99284 EMERGENCY DEPT VISIT MOD MDM: CPT | Mod: 25

## 2020-02-19 PROCEDURE — 71045 X-RAY EXAM CHEST 1 VIEW: CPT

## 2020-02-19 PROCEDURE — 70450 CT HEAD/BRAIN W/O DYE: CPT

## 2020-02-19 PROCEDURE — 82962 GLUCOSE BLOOD TEST: CPT

## 2020-02-19 RX ORDER — ACETAMINOPHEN 500 MG
2 TABLET ORAL
Qty: 60 | Refills: 0
Start: 2020-02-19

## 2020-02-19 NOTE — ED PROVIDER NOTE - OBJECTIVE STATEMENT
85y f PMHx CAD on 81mg ASA, hypothyroidism, HTN, lung ca p/w fall. Pt and family at bedside witnessed mechanical fall - pt was using rollator to ambulate down the wheelchair ramp leaving a cardiology appt, one of the wheels became caught in the concrete and she fell backwards. She hit the back of her head, without LOC, witnessed by son. Pt remembers entire event. Nonambulatory since the fall however at baseline she cannot ambulate without walker assistance. Admits to mild "head heaviness" and "fuzzy vision." Denies pain and declining pain meds in ED. Pt lives in assisted living facility, daughter expressed concerns about her requiring admission if not feeling well even if no acute bleed/fracture/dislocation. Denies LOC, HA, neck pain, CP, SOB, abd pain, nausea, vomiting, double vision, vision loss, joint pain, back pain.

## 2020-02-19 NOTE — ED PROVIDER NOTE - ATTENDING CONTRIBUTION TO CARE
86yo female pmh CAD on aspirin, HTN, lung CA p/w fall, witnessed off of rollator. +posterior head trauma. Denies LOC, vomiting, vision changes, confusion. Denies chest pain, dyspnea, cough, fevers, abd pain, pelvis pain, extremity pain. Full ROM of all joints. AAOx3, CT head and c-spine, CXR and pain control

## 2020-02-19 NOTE — ED PROVIDER NOTE - PROGRESS NOTE DETAILS
CT of head and neck do not show any acute fracture or dislocation. No brain bleeding noted. CXR did not show any abnormalities. Patient without any additional complaints at this time. Will discharge home.

## 2020-02-19 NOTE — ED PROVIDER NOTE - PATIENT PORTAL LINK FT
You can access the FollowMyHealth Patient Portal offered by [facility] by registering at the following website: [email or web address]. By joining [employer]’s FollowMyHealth portal, you will also be able to view your health information using other applications (apps) compatible with our system.

## 2020-02-19 NOTE — ED PROVIDER NOTE - MUSCULOSKELETAL, MLM
Spine appears normal, range of motion is not limited, no muscle or joint tenderness. Unable to assess gait

## 2020-02-19 NOTE — ED PROVIDER NOTE - NSFOLLOWUPINSTRUCTIONS_ED_ALL_ED_FT
Please follow up with your primary care doctor within 1 week.    For any headache, take Tylenol 650mg every 6hrs as needed for pain.     Continue to walk with your rollator/walker as tolerated.    Return to the ED for new falls, worsening pain, dizziness, confusion, nausea, vomiting, vision changes, or any other concerns.

## 2020-02-19 NOTE — ED PROVIDER NOTE - CARE PLAN
Principal Discharge DX:	Contusion of scalp, initial encounter  Secondary Diagnosis:	Fall, initial encounter

## 2020-02-24 ENCOUNTER — EMERGENCY (EMERGENCY)
Facility: HOSPITAL | Age: 85
LOS: 0 days | Discharge: HOME | End: 2020-02-24
Attending: EMERGENCY MEDICINE
Payer: MEDICARE

## 2020-02-24 VITALS
HEART RATE: 104 BPM | TEMPERATURE: 98 F | SYSTOLIC BLOOD PRESSURE: 140 MMHG | RESPIRATION RATE: 17 BRPM | OXYGEN SATURATION: 98 % | DIASTOLIC BLOOD PRESSURE: 60 MMHG

## 2020-02-24 VITALS — WEIGHT: 175.05 LBS | HEIGHT: 61 IN

## 2020-02-24 DIAGNOSIS — R11.0 NAUSEA: ICD-10-CM

## 2020-02-24 DIAGNOSIS — E78.5 HYPERLIPIDEMIA, UNSPECIFIED: ICD-10-CM

## 2020-02-24 DIAGNOSIS — Z79.82 LONG TERM (CURRENT) USE OF ASPIRIN: ICD-10-CM

## 2020-02-24 DIAGNOSIS — E11.9 TYPE 2 DIABETES MELLITUS WITHOUT COMPLICATIONS: ICD-10-CM

## 2020-02-24 DIAGNOSIS — I10 ESSENTIAL (PRIMARY) HYPERTENSION: ICD-10-CM

## 2020-02-24 DIAGNOSIS — S06.0X9A CONCUSSION WITH LOSS OF CONSCIOUSNESS OF UNSPECIFIED DURATION, INITIAL ENCOUNTER: ICD-10-CM

## 2020-02-24 DIAGNOSIS — R42 DIZZINESS AND GIDDINESS: ICD-10-CM
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
## 2020-02-24 DIAGNOSIS — Z79.84 LONG TERM (CURRENT) USE OF ORAL HYPOGLYCEMIC DRUGS: ICD-10-CM
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
## 2020-02-24 DIAGNOSIS — M54.2 CERVICALGIA: ICD-10-CM

## 2020-02-24 DIAGNOSIS — Z88.5 ALLERGY STATUS TO NARCOTIC AGENT: ICD-10-CM

## 2020-02-24 PROCEDURE — 99284 EMERGENCY DEPT VISIT MOD MDM: CPT | Mod: 25

## 2020-02-24 PROCEDURE — 70450 CT HEAD/BRAIN W/O DYE: CPT | Mod: 26

## 2020-02-24 PROCEDURE — 99284 EMERGENCY DEPT VISIT MOD MDM: CPT

## 2020-02-24 PROCEDURE — 70450 CT HEAD/BRAIN W/O DYE: CPT

## 2020-02-24 NOTE — ED STATDOCS - PMH
Diabetes mellitus    HLD (hyperlipidemia)    HTN (hypertension)    No pertinent past medical history

## 2020-02-24 NOTE — ED ADULT TRIAGE NOTE - CHIEF COMPLAINT QUOTE
Patient presents with neck pain reports she fell last Wednesday and hit her head. Was seen at [facility] and discharged. Since fall patient has had dizziness nausea and neck pain

## 2020-02-24 NOTE — ED STATDOCS - NSFOLLOWUPINSTRUCTIONS_ED_ALL_ED_FT
Concussion    WHAT YOU NEED TO KNOW:    A concussion is a mild brain injury. It is usually caused by a bump or blow to the head from a fall, a motor vehicle crash, or a sports injury. Sometimes being shaken forcefully may cause a concussion.    DISCHARGE INSTRUCTIONS:    Have someone call 911 for any of the following:     Someone tries to wake you and cannot do so.      You have a seizure, increasing confusion, or a change in personality.      Your speech becomes slurred, or you have new vision problems.    Return to the emergency department if:     You have sudden and new vision problems.      You have a severe headache that does not go away.      You have arm or leg weakness, numbness, or new problems with coordination.      You have blood or clear fluid coming out of the ears or nose.    Contact your healthcare provider if:     You have nausea or are vomiting.      You feel more sleepy than usual.      Your symptoms get worse.      Your symptoms last longer than 6 weeks after the injury.      You have questions or concerns about your condition or care.    Medicines: You may need any of the following:     Acetaminophen decreases pain and fever. It is available without a doctor's order. Ask how much to take and how often to take it. Follow directions. Read the labels of all other medicines you are using to see if they also contain acetaminophen, or ask your doctor or pharmacist. Acetaminophen can cause liver damage if not taken correctly. Do not use more than 4 grams (4,000 milligrams) total of acetaminophen in one day.       NSAIDs help decrease swelling and pain or fever. This medicine is available with or without a doctor's order. NSAIDs can cause stomach bleeding or kidney problems in certain people. If you take blood thinner medicine, always ask your healthcare provider if NSAIDs are safe for you. Always read the medicine label and follow directions.      Take your medicine as directed. Contact your healthcare provider if you think your medicine is not helping or if you have side effects. Tell him or her if you are allergic to any medicine. Keep a list of the medicines, vitamins, and herbs you take. Include the amounts, and when and why you take them. Bring the list or the pill bottles to follow-up visits. Carry your medicine list with you in case of an emergency.    Self-care: Concussion symptoms usually go away within about 10 days, but they may last longer. The following may be recommended to manage your symptoms:     Rest from physical and mental activities as directed. Mental activities are those that require thinking, concentration, and attention. You will need to rest until your symptoms are gone. Rest will allow you to recover from your concussion. Ask your healthcare provider when you can return to work and other daily activities.      Have someone stay with you for the first 24 hours after your injury. Your healthcare provider should be contacted if your symptoms get worse, or you develop new symptoms.      Do not participate in sports and physical activities until your healthcare provider says it is okay. They could make your symptoms worse or lead to another concussion. Your healthcare provider will tell you when it is okay for you to return to sports or physical activities. Ask for more information about sports concussions.    Prevent another concussion:     Wear protective sports equipment that fits properly. Helmets help decrease your risk for a serious brain injury. Talk to your healthcare provider about ways you can decrease your risk for a concussion if you play sports.      Wear your seatbelt every time you travel. This helps to decrease your risk for a head injury if you are in a car accident.     Follow up with your healthcare provider as directed: Write down your questions so you remember to ask them during your visits.     FOLLOW UP EVALUATION  To ensure optimal concussion recovery, follow up with a doctor specialized in concussion management. An evaluation by a specialty concussion program can ensure timely return to activities.    We offer appointments through the [facility] Concussion Program’s Hotline at [phone]. Concussion    WHAT YOU NEED TO KNOW:    A concussion is a mild brain injury. It is usually caused by a bump or blow to the head from a fall, a motor vehicle crash, or a sports injury. Sometimes being shaken forcefully may cause a concussion.    DISCHARGE INSTRUCTIONS:    Have someone call 911 for any of the following:     Someone tries to wake you and cannot do so.      You have a seizure, increasing confusion, or a change in personality.      Your speech becomes slurred, or you have new vision problems.    Return to the emergency department if:     You have sudden and new vision problems.      You have a severe headache that does not go away.      You have arm or leg weakness, numbness, or new problems with coordination.      You have blood or clear fluid coming out of the ears or nose.    Contact your healthcare provider if:     You have nausea or are vomiting.      You feel more sleepy than usual.      Your symptoms get worse.      Your symptoms last longer than 6 weeks after the injury.      You have questions or concerns about your condition or care.    Medicines: You may need any of the following:     Acetaminophen decreases pain and fever. It is available without a doctor's order. Ask how much to take and how often to take it. Follow directions. Read the labels of all other medicines you are using to see if they also contain acetaminophen, or ask your doctor or pharmacist. Acetaminophen can cause liver damage if not taken correctly. Do not use more than 4 grams (4,000 milligrams) total of acetaminophen in one day.       NSAIDs help decrease swelling and pain or fever. This medicine is available with or without a doctor's order. NSAIDs can cause stomach bleeding or kidney problems in certain people. If you take blood thinner medicine, always ask your healthcare provider if NSAIDs are safe for you. Always read the medicine label and follow directions.      Take your medicine as directed. Contact your healthcare provider if you think your medicine is not helping or if you have side effects. Tell him or her if you are allergic to any medicine. Keep a list of the medicines, vitamins, and herbs you take. Include the amounts, and when and why you take them. Bring the list or the pill bottles to follow-up visits. Carry your medicine list with you in case of an emergency.    Self-care: Concussion symptoms usually go away within about 10 days, but they may last longer. The following may be recommended to manage your symptoms:     Rest from physical and mental activities as directed. Mental activities are those that require thinking, concentration, and attention. You will need to rest until your symptoms are gone. Rest will allow you to recover from your concussion. Ask your healthcare provider when you can return to work and other daily activities.      Have someone stay with you for the first 24 hours after your injury. Your healthcare provider should be contacted if your symptoms get worse, or you develop new symptoms.      Do not participate in sports and physical activities until your healthcare provider says it is okay. They could make your symptoms worse or lead to another concussion. Your healthcare provider will tell you when it is okay for you to return to sports or physical activities. Ask for more information about sports concussions.    Prevent another concussion:     Wear protective sports equipment that fits properly. Helmets help decrease your risk for a serious brain injury. Talk to your healthcare provider about ways you can decrease your risk for a concussion if you play sports.      Wear your seatbelt every time you travel. This helps to decrease your risk for a head injury if you are in a car accident.     Follow up with your healthcare provider as directed: Write down your questions so you remember to ask them during your visits.     FOLLOW UP EVALUATION  To ensure optimal concussion recovery, follow up with a doctor specialized in concussion management. An evaluation by a specialty concussion program can ensure timely return to activities.    We offer appointments through the [facility] Concussion Program’s Hotline at [phone].    Cervical Strain    WHAT YOU NEED TO KNOW:    A cervical strain is a stretched or torn muscle or tendon in your neck. Tendons are strong tissues that connect muscles to bones.    DISCHARGE INSTRUCTIONS:    Seek care immediately if:     You have pain or numbness from your shoulder down to your hand.      You have problems with your vision, hearing, or balance.      You feel confused or cannot concentrate.      You have problems with movement and strength.    Call your doctor if:     You have increased swelling or pain in your neck.       You have questions or concerns about your condition or care.    Medicines: You may need any of the following:     Acetaminophen decreases pain and fever. It is available without a doctor's order. Ask how much to take and how often to take it. Follow directions. Read the labels of all other medicines you are using to see if they also contain acetaminophen, or ask your doctor or pharmacist. Acetaminophen can cause liver damage if not taken correctly. Do not use more than 4 grams (4,000 milligrams) total of acetaminophen in one day.       NSAIDs, such as ibuprofen, help decrease swelling, pain, and fever. This medicine is available with or without a doctor's order. NSAIDs can cause stomach bleeding or kidney problems in certain people. If you take blood thinner medicine, always ask your healthcare provider if NSAIDs are safe for you. Always read the medicine label and follow directions.      Muscle relaxers help decrease pain and muscle spasms.      Prescription pain medicine may be given. Ask your healthcare provider how to take this medicine safely. Some prescription pain medicines contain acetaminophen. Do not take other medicines that contain acetaminophen without talking to your healthcare provider. Too much acetaminophen may cause liver damage. Prescription pain medicine may cause constipation. Ask your healthcare provider how to prevent or treat constipation.       Take your medicine as directed. Contact your healthcare provider if you think your medicine is not helping or if you have side effects. Tell him or her if you are allergic to any medicine. Keep a list of the medicines, vitamins, and herbs you take. Include the amounts, and when and why you take them. Bring the list or the pill bottles to follow-up visits. Carry your medicine list with you in case of an emergency.    Manage your symptoms:     Apply heat on your neck for 15 to 20 minutes, 4 to 6 times a day or as directed. Heat helps decrease pain, stiffness, and muscle spasms.      Begin gentle neck exercises as soon as you can move your neck without pain. Exercises will help decrease stiffness and improve the strength and movement of your neck. Ask your healthcare provider what kind of exercises you should do.      Gradually return to your usual activities as directed. Stop if you have pain. Avoid activities that can cause more damage to your neck, such as heavy lifting or strenuous exercise.      Sleep without a pillow to help decrease pain. Instead, roll a small towel tightly and place it under your neck.       Go to physical therapy as directed. A physical therapist teaches you exercises to help improve movement and strength, and to decrease pain.     Prevent another neck injury:     Drive safely. Make sure everyone in your car wears a seatbelt. A seatbelt can save your life if you are in an accident. Do not use your cell phone when you are driving. This could distract you and cause an accident. Pull over if you need to make a call or send a text message.       Wear helmets, lifejackets, and protective gear. Always wear a helmet when you ride a bike or motorcycle, go skiing, or play sports that could cause a head injury. Wear protective equipment when you play sports. Wear a lifejacket when you are on a boat or doing water sports.     Follow up with your doctor as directed: You may be referred to an orthopedist or physical therapies. Write down your questions so you remember to ask them during your visits.     FOLLOW UP WITH YOUR NEUROLOGIST OR THE [facility] CONCUSSION CLINIC IN 1-2 DAYS FOR YOUR CONCUSSION AND NECK PAIN. YOU MAY USE A HEATING PAD OR WARM COMPRESSES OR ICE FOR YOUR NECK PAIN, WHICHEVER SEEMS TO HELP. RETURN TO ER FOR ANY WORSENING SYMPTOMS OR NEW CONCERNS.

## 2020-02-24 NOTE — ED STATDOCS - OBJECTIVE STATEMENT
86 y/o female with a PMHx of DM on Metformin, HTN, HLD, presents to the ED c/o neck pain. Pt fell 5 days ago outside of her doctor's office and hit head. Pt was seen at [place], had CT head and neck at that time, resulting negative. Pt now with neck pain, dizziness, and nausea. On 81mg ASA. No other complaints at this time.

## 2020-02-24 NOTE — ED ADULT NURSE NOTE - OBJECTIVE STATEMENT
Pt. c/o of neck pain since friday. Pt. states she had a fall on occiput on wednesday, +blood thinners, was seen and discharged from [facility] s/p neg CT scans. Pt. states that neck pain began when trying to lift head off pillow in AM on friday and has become worst. Pt. denies photophobia, visual changes.

## 2020-02-24 NOTE — ED STATDOCS - PATIENT PORTAL LINK FT
You can access the FollowMyHealth Patient Portal offered by [facility] by registering at the following website: [email or web address]. By joining [employer]’s FollowMyHealth portal, you will also be able to view your health information using other applications (apps) compatible with our system.

## 2020-02-24 NOTE — ED ADULT NURSE NOTE - NSIMPLEMENTINTERV_GEN_ALL_ED
Implemented All Fall with Harm Risk Interventions:  [place] to call system. Call bell, personal items and telephone within reach. Instruct patient to call for assistance. Room bathroom lighting operational. Non-slip footwear when patient is off stretcher. Physically safe environment: no spills, clutter or unnecessary equipment. Stretcher in lowest position, wheels locked, appropriate side rails in place. Provide visual cue, wrist band, yellow gown, etc. Monitor gait and stability. Monitor for mental status changes and reorient to person, place, and time. Review medications for side effects contributing to fall risk. Reinforce activity limits and safety measures with patient and family. Provide visual clues: red socks.

## 2020-02-24 NOTE — ED STATDOCS - PROGRESS NOTE DETAILS
signed Rosario Verdugo PA-C Pt seen initially in intake by Dr. Blancas   85F BIB family for persistent HA, dizziness, neck pain in the mornings, and "not feeling right" since pt fell and hit her head on 2/19, was seen in [place] ED and had negative CT head/c spine. Pts name was misspelled (Corinna) on that chart. Repeat CT negative in ED. Pt alert, NAD. Likely concussion symptoms, recommend pt f/u with her neurologist Dr Zuniga or concussion clinic. Neck pain likely muscular, recommend heat or ice, may need physical therapy. Pt is very reluctant to take meds for anything and does not want rx for neck pain. Pt feeling well, pt and family agree with DC and plan of care.

## 2020-02-24 NOTE — ED ADULT NURSE NOTE - CHIEF COMPLAINT QUOTE
Patient presents with neck pain reports she fell last Wednesday and hit her head. Was seen at [facility] and discharged. Since fall patient has had dizziness nausea and neck pain

## 2021-01-20 NOTE — ED STATDOCS - CHIEF COMPLAINT
The patient is a 85y year old Female complaining of neck pain. Consent (Nose)/Introductory Paragraph: The rationale for Mohs was explained to the patient and consent was obtained. The risks, benefits and alternatives to therapy were discussed in detail. Specifically, the risks of nasal deformity, changes in the flow of air through the nose, infection, scarring, bleeding, prolonged wound healing, incomplete removal, allergy to anesthesia, nerve injury and recurrence were addressed. Prior to the procedure, the treatment site was clearly identified and confirmed by the patient using a hand mirror. All components of Universal Protocol/PAUSE Rule completed.

## 2021-01-29 ENCOUNTER — INPATIENT (INPATIENT)
Facility: HOSPITAL | Age: 86
LOS: 3 days | Discharge: HOME HEALTH | DRG: 872 | End: 2021-02-02
Attending: INTERNAL MEDICINE | Admitting: FAMILY MEDICINE
Payer: MEDICARE

## 2021-01-29 VITALS
HEART RATE: 105 BPM | DIASTOLIC BLOOD PRESSURE: 51 MMHG | RESPIRATION RATE: 18 BRPM | OXYGEN SATURATION: 96 % | HEIGHT: 61 IN | SYSTOLIC BLOOD PRESSURE: 134 MMHG | TEMPERATURE: 99 F | WEIGHT: 169.98 LBS

## 2021-01-29 DIAGNOSIS — N12 TUBULO-INTERSTITIAL NEPHRITIS, NOT SPECIFIED AS ACUTE OR CHRONIC: ICD-10-CM

## 2021-01-29 PROBLEM — E11.9 TYPE 2 DIABETES MELLITUS WITHOUT COMPLICATIONS: Chronic | Status: ACTIVE | Noted: 2020-02-24

## 2021-01-29 PROBLEM — E78.5 HYPERLIPIDEMIA, UNSPECIFIED: Chronic | Status: ACTIVE | Noted: 2020-02-24

## 2021-01-29 PROBLEM — E11.9 TYPE 2 DIABETES MELLITUS WITHOUT COMPLICATIONS: Chronic | Status: ACTIVE | Noted: 2020-02-19

## 2021-01-29 PROBLEM — I10 ESSENTIAL (PRIMARY) HYPERTENSION: Chronic | Status: ACTIVE | Noted: 2020-02-24

## 2021-01-29 PROBLEM — I10 ESSENTIAL (PRIMARY) HYPERTENSION: Chronic | Status: ACTIVE | Noted: 2020-02-19

## 2021-01-29 PROBLEM — I25.10 ATHEROSCLEROTIC HEART DISEASE OF NATIVE CORONARY ARTERY WITHOUT ANGINA PECTORIS: Chronic | Status: ACTIVE | Noted: 2020-02-19

## 2021-01-29 LAB
ALBUMIN SERPL ELPH-MCNC: 2.9 G/DL — LOW (ref 3.3–5)
ALP SERPL-CCNC: 113 U/L — SIGNIFICANT CHANGE UP (ref 40–120)
ALT FLD-CCNC: 17 U/L — SIGNIFICANT CHANGE UP (ref 12–78)
ANION GAP SERPL CALC-SCNC: 11 MMOL/L — SIGNIFICANT CHANGE UP (ref 5–17)
APPEARANCE UR: ABNORMAL
AST SERPL-CCNC: 12 U/L — LOW (ref 15–37)
BASOPHILS # BLD AUTO: 0.06 K/UL — SIGNIFICANT CHANGE UP (ref 0–0.2)
BASOPHILS NFR BLD AUTO: 0.4 % — SIGNIFICANT CHANGE UP (ref 0–2)
BILIRUB SERPL-MCNC: 0.3 MG/DL — SIGNIFICANT CHANGE UP (ref 0.2–1.2)
BILIRUB UR-MCNC: NEGATIVE — SIGNIFICANT CHANGE UP
BUN SERPL-MCNC: 31 MG/DL — HIGH (ref 7–23)
CALCIUM SERPL-MCNC: 9.4 MG/DL — SIGNIFICANT CHANGE UP (ref 8.5–10.1)
CHLORIDE SERPL-SCNC: 108 MMOL/L — SIGNIFICANT CHANGE UP (ref 96–108)
CO2 SERPL-SCNC: 22 MMOL/L — SIGNIFICANT CHANGE UP (ref 22–31)
COLOR SPEC: YELLOW — SIGNIFICANT CHANGE UP
CREAT SERPL-MCNC: 1.08 MG/DL — SIGNIFICANT CHANGE UP (ref 0.5–1.3)
DIFF PNL FLD: ABNORMAL
EOSINOPHIL # BLD AUTO: 0.19 K/UL — SIGNIFICANT CHANGE UP (ref 0–0.5)
EOSINOPHIL NFR BLD AUTO: 1.2 % — SIGNIFICANT CHANGE UP (ref 0–6)
GLUCOSE SERPL-MCNC: 125 MG/DL — HIGH (ref 70–99)
GLUCOSE UR QL: NEGATIVE MG/DL — SIGNIFICANT CHANGE UP
HCT VFR BLD CALC: 33.7 % — LOW (ref 34.5–45)
HGB BLD-MCNC: 10.9 G/DL — LOW (ref 11.5–15.5)
IMM GRANULOCYTES NFR BLD AUTO: 0.9 % — SIGNIFICANT CHANGE UP (ref 0–1.5)
KETONES UR-MCNC: NEGATIVE — SIGNIFICANT CHANGE UP
LACTATE SERPL-SCNC: 3.4 MMOL/L — HIGH (ref 0.7–2)
LACTATE SERPL-SCNC: 3.6 MMOL/L — HIGH (ref 0.7–2)
LEUKOCYTE ESTERASE UR-ACNC: ABNORMAL
LYMPHOCYTES # BLD AUTO: 1.38 K/UL — SIGNIFICANT CHANGE UP (ref 1–3.3)
LYMPHOCYTES # BLD AUTO: 8.9 % — LOW (ref 13–44)
MAGNESIUM SERPL-MCNC: 1.6 MG/DL — SIGNIFICANT CHANGE UP (ref 1.6–2.6)
MCHC RBC-ENTMCNC: 28.8 PG — SIGNIFICANT CHANGE UP (ref 27–34)
MCHC RBC-ENTMCNC: 32.3 GM/DL — SIGNIFICANT CHANGE UP (ref 32–36)
MCV RBC AUTO: 88.9 FL — SIGNIFICANT CHANGE UP (ref 80–100)
MONOCYTES # BLD AUTO: 1.35 K/UL — HIGH (ref 0–0.9)
MONOCYTES NFR BLD AUTO: 8.7 % — SIGNIFICANT CHANGE UP (ref 2–14)
NEUTROPHILS # BLD AUTO: 12.39 K/UL — HIGH (ref 1.8–7.4)
NEUTROPHILS NFR BLD AUTO: 79.9 % — HIGH (ref 43–77)
NITRITE UR-MCNC: NEGATIVE — SIGNIFICANT CHANGE UP
PH UR: 5 — SIGNIFICANT CHANGE UP (ref 5–8)
PHOSPHATE SERPL-MCNC: 3.1 MG/DL — SIGNIFICANT CHANGE UP (ref 2.5–4.5)
PLATELET # BLD AUTO: 255 K/UL — SIGNIFICANT CHANGE UP (ref 150–400)
POTASSIUM SERPL-MCNC: 3.7 MMOL/L — SIGNIFICANT CHANGE UP (ref 3.5–5.3)
POTASSIUM SERPL-SCNC: 3.7 MMOL/L — SIGNIFICANT CHANGE UP (ref 3.5–5.3)
PROT SERPL-MCNC: 7.4 GM/DL — SIGNIFICANT CHANGE UP (ref 6–8.3)
PROT UR-MCNC: 30 MG/DL
RAPID RVP RESULT: SIGNIFICANT CHANGE UP
RBC # BLD: 3.79 M/UL — LOW (ref 3.8–5.2)
RBC # FLD: 13.7 % — SIGNIFICANT CHANGE UP (ref 10.3–14.5)
SARS-COV-2 RNA SPEC QL NAA+PROBE: SIGNIFICANT CHANGE UP
SODIUM SERPL-SCNC: 141 MMOL/L — SIGNIFICANT CHANGE UP (ref 135–145)
SP GR SPEC: 1.01 — SIGNIFICANT CHANGE UP (ref 1.01–1.02)
TROPONIN I SERPL-MCNC: <0.015 NG/ML — SIGNIFICANT CHANGE UP (ref 0.01–0.04)
UROBILINOGEN FLD QL: NEGATIVE MG/DL — SIGNIFICANT CHANGE UP
WBC # BLD: 15.51 K/UL — HIGH (ref 3.8–10.5)
WBC # FLD AUTO: 15.51 K/UL — HIGH (ref 3.8–10.5)

## 2021-01-29 PROCEDURE — 83036 HEMOGLOBIN GLYCOSYLATED A1C: CPT

## 2021-01-29 PROCEDURE — 99223 1ST HOSP IP/OBS HIGH 75: CPT

## 2021-01-29 PROCEDURE — 97530 THERAPEUTIC ACTIVITIES: CPT | Mod: GP

## 2021-01-29 PROCEDURE — 93010 ELECTROCARDIOGRAM REPORT: CPT

## 2021-01-29 PROCEDURE — 36415 COLL VENOUS BLD VENIPUNCTURE: CPT

## 2021-01-29 PROCEDURE — 80048 BASIC METABOLIC PNL TOTAL CA: CPT

## 2021-01-29 PROCEDURE — U0005: CPT

## 2021-01-29 PROCEDURE — U0003: CPT

## 2021-01-29 PROCEDURE — 83605 ASSAY OF LACTIC ACID: CPT

## 2021-01-29 PROCEDURE — 97162 PT EVAL MOD COMPLEX 30 MIN: CPT | Mod: GP

## 2021-01-29 PROCEDURE — 82962 GLUCOSE BLOOD TEST: CPT

## 2021-01-29 PROCEDURE — 85027 COMPLETE CBC AUTOMATED: CPT

## 2021-01-29 PROCEDURE — 71045 X-RAY EXAM CHEST 1 VIEW: CPT | Mod: 26

## 2021-01-29 PROCEDURE — 86769 SARS-COV-2 COVID-19 ANTIBODY: CPT

## 2021-01-29 PROCEDURE — 97116 GAIT TRAINING THERAPY: CPT | Mod: GP

## 2021-01-29 RX ORDER — ATORVASTATIN CALCIUM 80 MG/1
80 TABLET, FILM COATED ORAL AT BEDTIME
Refills: 0 | Status: DISCONTINUED | OUTPATIENT
Start: 2021-01-29 | End: 2021-02-02

## 2021-01-29 RX ORDER — OMEGA-3 ACID ETHYL ESTERS 1 G
0 CAPSULE ORAL
Qty: 0 | Refills: 0 | DISCHARGE

## 2021-01-29 RX ORDER — DEXTROSE 50 % IN WATER 50 %
25 SYRINGE (ML) INTRAVENOUS ONCE
Refills: 0 | Status: DISCONTINUED | OUTPATIENT
Start: 2021-01-29 | End: 2021-02-02

## 2021-01-29 RX ORDER — CEFTRIAXONE 500 MG/1
1000 INJECTION, POWDER, FOR SOLUTION INTRAMUSCULAR; INTRAVENOUS EVERY 24 HOURS
Refills: 0 | Status: COMPLETED | OUTPATIENT
Start: 2021-01-30 | End: 2021-02-01

## 2021-01-29 RX ORDER — SODIUM CHLORIDE 9 MG/ML
1000 INJECTION, SOLUTION INTRAVENOUS
Refills: 0 | Status: DISCONTINUED | OUTPATIENT
Start: 2021-01-29 | End: 2021-02-02

## 2021-01-29 RX ORDER — DEXTROSE 50 % IN WATER 50 %
15 SYRINGE (ML) INTRAVENOUS ONCE
Refills: 0 | Status: DISCONTINUED | OUTPATIENT
Start: 2021-01-29 | End: 2021-02-02

## 2021-01-29 RX ORDER — LOSARTAN POTASSIUM 100 MG/1
50 TABLET, FILM COATED ORAL DAILY
Refills: 0 | Status: DISCONTINUED | OUTPATIENT
Start: 2021-01-29 | End: 2021-02-02

## 2021-01-29 RX ORDER — ASPIRIN/CALCIUM CARB/MAGNESIUM 324 MG
81 TABLET ORAL DAILY
Refills: 0 | Status: DISCONTINUED | OUTPATIENT
Start: 2021-01-29 | End: 2021-02-02

## 2021-01-29 RX ORDER — SODIUM CHLORIDE 9 MG/ML
1500 INJECTION INTRAMUSCULAR; INTRAVENOUS; SUBCUTANEOUS ONCE
Refills: 0 | Status: COMPLETED | OUTPATIENT
Start: 2021-01-29 | End: 2021-01-29

## 2021-01-29 RX ORDER — GLUCAGON INJECTION, SOLUTION 0.5 MG/.1ML
1 INJECTION, SOLUTION SUBCUTANEOUS ONCE
Refills: 0 | Status: DISCONTINUED | OUTPATIENT
Start: 2021-01-29 | End: 2021-02-02

## 2021-01-29 RX ORDER — DEXTROSE 50 % IN WATER 50 %
12.5 SYRINGE (ML) INTRAVENOUS ONCE
Refills: 0 | Status: DISCONTINUED | OUTPATIENT
Start: 2021-01-29 | End: 2021-02-02

## 2021-01-29 RX ORDER — METFORMIN HYDROCHLORIDE 850 MG/1
1 TABLET ORAL
Qty: 0 | Refills: 0 | DISCHARGE

## 2021-01-29 RX ORDER — INSULIN LISPRO 100/ML
VIAL (ML) SUBCUTANEOUS
Refills: 0 | Status: DISCONTINUED | OUTPATIENT
Start: 2021-01-29 | End: 2021-02-02

## 2021-01-29 RX ORDER — ENOXAPARIN SODIUM 100 MG/ML
40 INJECTION SUBCUTANEOUS DAILY
Refills: 0 | Status: DISCONTINUED | OUTPATIENT
Start: 2021-01-29 | End: 2021-02-02

## 2021-01-29 RX ORDER — CEFTRIAXONE 500 MG/1
1000 INJECTION, POWDER, FOR SOLUTION INTRAMUSCULAR; INTRAVENOUS EVERY 24 HOURS
Refills: 0 | Status: DISCONTINUED | OUTPATIENT
Start: 2021-01-29 | End: 2021-01-29

## 2021-01-29 RX ORDER — SODIUM CHLORIDE 9 MG/ML
1000 INJECTION, SOLUTION INTRAVENOUS
Refills: 0 | Status: DISCONTINUED | OUTPATIENT
Start: 2021-01-29 | End: 2021-01-30

## 2021-01-29 RX ORDER — PANTOPRAZOLE SODIUM 20 MG/1
40 TABLET, DELAYED RELEASE ORAL
Refills: 0 | Status: DISCONTINUED | OUTPATIENT
Start: 2021-01-29 | End: 2021-02-02

## 2021-01-29 RX ORDER — SODIUM CHLORIDE 9 MG/ML
500 INJECTION INTRAMUSCULAR; INTRAVENOUS; SUBCUTANEOUS ONCE
Refills: 0 | Status: COMPLETED | OUTPATIENT
Start: 2021-01-29 | End: 2021-01-29

## 2021-01-29 RX ORDER — SODIUM CHLORIDE 9 MG/ML
1000 INJECTION INTRAMUSCULAR; INTRAVENOUS; SUBCUTANEOUS
Refills: 0 | Status: DISCONTINUED | OUTPATIENT
Start: 2021-01-29 | End: 2021-01-30

## 2021-01-29 RX ORDER — CEFTRIAXONE 500 MG/1
2000 INJECTION, POWDER, FOR SOLUTION INTRAMUSCULAR; INTRAVENOUS ONCE
Refills: 0 | Status: DISCONTINUED | OUTPATIENT
Start: 2021-01-29 | End: 2021-01-29

## 2021-01-29 RX ORDER — MECLIZINE HCL 12.5 MG
12.5 TABLET ORAL DAILY
Refills: 0 | Status: DISCONTINUED | OUTPATIENT
Start: 2021-01-29 | End: 2021-02-02

## 2021-01-29 RX ORDER — SODIUM CHLORIDE 9 MG/ML
1000 INJECTION, SOLUTION INTRAVENOUS ONCE
Refills: 0 | Status: COMPLETED | OUTPATIENT
Start: 2021-01-29 | End: 2021-01-29

## 2021-01-29 RX ORDER — ACETAMINOPHEN 500 MG
1000 TABLET ORAL ONCE
Refills: 0 | Status: COMPLETED | OUTPATIENT
Start: 2021-01-29 | End: 2021-01-29

## 2021-01-29 RX ORDER — INSULIN LISPRO 100/ML
VIAL (ML) SUBCUTANEOUS AT BEDTIME
Refills: 0 | Status: DISCONTINUED | OUTPATIENT
Start: 2021-01-29 | End: 2021-02-02

## 2021-01-29 RX ORDER — CEFTRIAXONE 500 MG/1
2000 INJECTION, POWDER, FOR SOLUTION INTRAMUSCULAR; INTRAVENOUS ONCE
Refills: 0 | Status: COMPLETED | OUTPATIENT
Start: 2021-01-29 | End: 2021-01-29

## 2021-01-29 RX ADMIN — SODIUM CHLORIDE 1000 MILLILITER(S): 9 INJECTION, SOLUTION INTRAVENOUS at 17:01

## 2021-01-29 RX ADMIN — CEFTRIAXONE 2000 MILLIGRAM(S): 500 INJECTION, POWDER, FOR SOLUTION INTRAMUSCULAR; INTRAVENOUS at 13:52

## 2021-01-29 RX ADMIN — SODIUM CHLORIDE 250 MILLILITER(S): 9 INJECTION INTRAMUSCULAR; INTRAVENOUS; SUBCUTANEOUS at 20:33

## 2021-01-29 RX ADMIN — SODIUM CHLORIDE 1500 MILLILITER(S): 9 INJECTION INTRAMUSCULAR; INTRAVENOUS; SUBCUTANEOUS at 13:24

## 2021-01-29 RX ADMIN — SODIUM CHLORIDE 150 MILLILITER(S): 9 INJECTION INTRAMUSCULAR; INTRAVENOUS; SUBCUTANEOUS at 22:59

## 2021-01-29 RX ADMIN — ENOXAPARIN SODIUM 40 MILLIGRAM(S): 100 INJECTION SUBCUTANEOUS at 17:01

## 2021-01-29 RX ADMIN — SODIUM CHLORIDE 1500 MILLILITER(S): 9 INJECTION INTRAMUSCULAR; INTRAVENOUS; SUBCUTANEOUS at 14:30

## 2021-01-29 RX ADMIN — Medication 1000 MILLIGRAM(S): at 13:52

## 2021-01-29 RX ADMIN — ATORVASTATIN CALCIUM 80 MILLIGRAM(S): 80 TABLET, FILM COATED ORAL at 20:49

## 2021-01-29 NOTE — H&P ADULT - NSICDXPASTMEDICALHX_GEN_ALL_CORE_FT
PAST MEDICAL HISTORY:  CAD (coronary artery disease)     Diabetes     Diabetes mellitus     HLD (hyperlipidemia)     HTN (hypertension)     Hypertension     No pertinent past medical history

## 2021-01-29 NOTE — ED PROVIDER NOTE - CONSTITUTIONAL, MLM
normal... Well appearing, awake, alert, oriented to person, place, and time  and in no apparent distress.

## 2021-01-29 NOTE — H&P ADULT - ASSESSMENT
86 y.o. female with PMHx of DMII, Breast CA, HTN, HLD, CAD s/p distant PCI with ROSARIO p/w chills/fever/weakness and inability to ambulate. Pt states started last night and couldn't walk due to weakness in LE in am associated with numbness of arms/legs - now improved.    1. Sepsis due to likely UTI as an etiology w/o septic shock - f/u UCx/BCx   - cont IV abx   - additional 1L LR due to persistently elevated lactate with continuous infusion after and f/u repeat    2. DMII - HISS    3. HTN/HLD/CAD - cont current meds    4. VTE proph - LMWH    5. GOC - full code

## 2021-01-29 NOTE — H&P ADULT - HISTORY OF PRESENT ILLNESS
86 y.o. female with PMHx of DMII, Breast CA, HTN, HLD, CAD s/p distant PCI with ROSARIO p/w chills/fever/weakness and inability to ambulate. Pt states started last night and couldn't walk due to weakness in LE in am associated with numbness of arms/legs - now improved

## 2021-01-29 NOTE — ED PROVIDER NOTE - ATTENDING CONTRIBUTION TO CARE
I, John Louise MD, personally saw the patient with the resident, and completed the key components of the history and physical exam. I then discussed the management plan with the resident.

## 2021-01-29 NOTE — ED ADULT TRIAGE NOTE - CHIEF COMPLAINT QUOTE
pt sent to ED by family and PMD for "weakness" and dark urine. pt denies any complaints. ambulated with walker at home - lives with daughter.

## 2021-01-29 NOTE — ED PROVIDER NOTE - OBJECTIVE STATEMENT
Mariella Gagnon MD: 85yo F with PMH of DM on metformin, januvia, HTN, HLD, CAD with stent on asa, hypothyroid, vertigo, breast ca s/p radial mastectomy, lymphedema of RUE who presents with weakness, decreased appetite for 1 week and dark urine. As per daughter, aide noticed brown urine with specks of black since last week. No dysuria. Pt complains that felt sensation of "ants crawling on fingers" earlier this AM lasting 2 hours. No fever, URI symptoms. N/V/D, abd pain. Baseline AAOx3 with occasional confusion, ambulated with walker.       PMD: Dr. Jamal Carpio

## 2021-01-29 NOTE — ED PROVIDER NOTE - PMH
CAD (coronary artery disease)    Diabetes    Diabetes mellitus    HLD (hyperlipidemia)    HTN (hypertension)    Hypertension    No pertinent past medical history

## 2021-01-29 NOTE — ED PROVIDER NOTE - PROGRESS NOTE DETAILS
Dionicio Hebert for attending Dr. Louise: 85 y/o female with a PMHx of CAD, DM, HTN, HLD presents to the ED c/o numbness and tingling b/l arms and legs. Sensation now resolved. Pt lives with daughter and aide. Ambulates with walker. PCP: Dr. Carpio. Exam: Pt A&O x3. CN 2-12 intact. 1+ pedal edema. Pt febrile 100.4 rectally. Plan: labs, XR, urine. Dionicio Hebert for attending Dr. Louise: BMI greater than 30. IV bolus given based on ideal body weight.

## 2021-01-29 NOTE — ED PROVIDER NOTE - CLINICAL SUMMARY MEDICAL DECISION MAKING FREE TEXT BOX
Mariella Gagnon MD: 85yo F with PMH of DM on metformin, januvia, HTN, HLD, CAD with stent on asa, hypothyroid, vertigo, breast ca s/p radial mastectomy, lymphedema of RUE who presents with generalized weakness, dark urine for 1 week, self resolved paresthesias today. Pt hemodynamically stable, febrile, borderline tachycardic. Ddx includes pyelonephritis, viral illness. Low suspicion for stroke as pt has no focal neurological deficits on exam and paresthesias resolved, might be related to diabetic neuropathy, electrolyte abn. Will get labs, cultures, EKG, CXR, UA/UC, IV ceftriaxone, IVF and reassess

## 2021-01-29 NOTE — ED PROVIDER NOTE - PHYSICAL EXAMINATION
CONSTITUTIONAL: Nontoxic, well nourished, well developed, elderly female, resting comfortably in no acute distress  HEAD: Normocephalic; atraumatic  EYES: Normal inspection, EOMI  ENMT: External appears normal; normal oropharynx  NECK: Supple; non-tender; no cervical lymphadenopathy  CARD: RRR; no audible murmurs, rubs, or gallops  RESP: No respiratory distress, lungs ctab/l  ABD: Soft, non-distended; non-tender; no rebound or guarding  EXT: No LE pitting edema or calf tenderness; distal pulses intact with good capillary refill, RUE edema   SKIN: Warm, dry, intact  : no CVAT   NEURO: aaox3, CN II-IX intact, 5/5 strength b/l UE and 4/5 strength b/l LE, sensation intact in all extremities, no pronator drift

## 2021-01-29 NOTE — H&P ADULT - NSHPPHYSICALEXAM_GEN_ALL_CORE
In OR still,will reevaluate tomorrow. PHYSICAL EXAM:    General: elderly female in no acute distress  Eyes: PERRLA, EOMI; conjunctiva and sclera clear  Head: Normocephalic; atraumatic  ENMT: No nasal discharge; airway clear  Neck: Supple; non tender; no masses  Respiratory: No wheezes, rales or rhonchi  Cardiovascular: S1, S2 eg with II/VI LYLY  Gastrointestinal: Soft abd with no tenderness, + BS, mild distention  Genitourinary: No costovertebral angle tenderness  Extremities: No clubbing, cyanosis, trace BL LE edema, + RUE lymphedema  Vascular: Peripheral pulses palpable 2+ bilaterally  Neurological: Alert and oriented x4  Skin: Warm and dry.   Musculoskeletal: Normal tone, without deformities  Psychiatric: Cooperative and appropriate

## 2021-01-29 NOTE — ED ADULT NURSE NOTE - OBJECTIVE STATEMENT
Pt a/ox3, BIBA from home with daughter with c/o weakness, poor PO intake x 1 week and parastesias in b/l hands. Denies pain or discomfort. Rectal temp as noted.

## 2021-01-29 NOTE — ED PROVIDER NOTE - NS ED ROS FT
General: denies fever, chills  HENT: denies nasal congestion, sore throat, rhinorrhea  Eyes: denies vision changes  CV: denies chest pain  Resp: denies difficulty breathing, cough  Abdominal: denies nausea, vomiting, diarrhea, abdominal pain, blood in stool, dark stool  : denies pain with urination  MSK: denies recent trauma  Neuro: denies headaches, dizziness, lightheadedness, +numbness, tingling   Skin: denies new rashes  Endocrine: denies recent weight loss

## 2021-01-30 LAB
A1C WITH ESTIMATED AVERAGE GLUCOSE RESULT: 6.4 % — HIGH (ref 4–5.6)
ANION GAP SERPL CALC-SCNC: 6 MMOL/L — SIGNIFICANT CHANGE UP (ref 5–17)
BUN SERPL-MCNC: 18 MG/DL — SIGNIFICANT CHANGE UP (ref 7–23)
CALCIUM SERPL-MCNC: 8.3 MG/DL — LOW (ref 8.5–10.1)
CHLORIDE SERPL-SCNC: 114 MMOL/L — HIGH (ref 96–108)
CO2 SERPL-SCNC: 21 MMOL/L — LOW (ref 22–31)
CREAT SERPL-MCNC: 0.72 MG/DL — SIGNIFICANT CHANGE UP (ref 0.5–1.3)
CULTURE RESULTS: SIGNIFICANT CHANGE UP
ESTIMATED AVERAGE GLUCOSE: 137 MG/DL — HIGH (ref 68–114)
GLUCOSE SERPL-MCNC: 133 MG/DL — HIGH (ref 70–99)
HCT VFR BLD CALC: 31 % — LOW (ref 34.5–45)
HGB BLD-MCNC: 9.6 G/DL — LOW (ref 11.5–15.5)
LACTATE SERPL-SCNC: 1 MMOL/L — SIGNIFICANT CHANGE UP (ref 0.7–2)
MCHC RBC-ENTMCNC: 28 PG — SIGNIFICANT CHANGE UP (ref 27–34)
MCHC RBC-ENTMCNC: 31 GM/DL — LOW (ref 32–36)
MCV RBC AUTO: 90.4 FL — SIGNIFICANT CHANGE UP (ref 80–100)
PLATELET # BLD AUTO: 197 K/UL — SIGNIFICANT CHANGE UP (ref 150–400)
POTASSIUM SERPL-MCNC: 4.2 MMOL/L — SIGNIFICANT CHANGE UP (ref 3.5–5.3)
POTASSIUM SERPL-SCNC: 4.2 MMOL/L — SIGNIFICANT CHANGE UP (ref 3.5–5.3)
RBC # BLD: 3.43 M/UL — LOW (ref 3.8–5.2)
RBC # FLD: 13.7 % — SIGNIFICANT CHANGE UP (ref 10.3–14.5)
SARS-COV-2 IGG SERPL QL IA: NEGATIVE — SIGNIFICANT CHANGE UP
SARS-COV-2 IGM SERPL IA-ACNC: 0.06 INDEX — SIGNIFICANT CHANGE UP
SODIUM SERPL-SCNC: 141 MMOL/L — SIGNIFICANT CHANGE UP (ref 135–145)
SPECIMEN SOURCE: SIGNIFICANT CHANGE UP
WBC # BLD: 10.17 K/UL — SIGNIFICANT CHANGE UP (ref 3.8–10.5)
WBC # FLD AUTO: 10.17 K/UL — SIGNIFICANT CHANGE UP (ref 3.8–10.5)

## 2021-01-30 PROCEDURE — 99233 SBSQ HOSP IP/OBS HIGH 50: CPT

## 2021-01-30 RX ORDER — POLYETHYLENE GLYCOL 3350 17 G/17G
17 POWDER, FOR SOLUTION ORAL ONCE
Refills: 0 | Status: COMPLETED | OUTPATIENT
Start: 2021-01-30 | End: 2021-01-30

## 2021-01-30 RX ADMIN — ENOXAPARIN SODIUM 40 MILLIGRAM(S): 100 INJECTION SUBCUTANEOUS at 09:05

## 2021-01-30 RX ADMIN — Medication 12.5 MILLIGRAM(S): at 09:05

## 2021-01-30 RX ADMIN — CEFTRIAXONE 1000 MILLIGRAM(S): 500 INJECTION, POWDER, FOR SOLUTION INTRAMUSCULAR; INTRAVENOUS at 11:54

## 2021-01-30 RX ADMIN — POLYETHYLENE GLYCOL 3350 17 GRAM(S): 17 POWDER, FOR SOLUTION ORAL at 20:37

## 2021-01-30 RX ADMIN — LOSARTAN POTASSIUM 50 MILLIGRAM(S): 100 TABLET, FILM COATED ORAL at 09:05

## 2021-01-30 RX ADMIN — ATORVASTATIN CALCIUM 80 MILLIGRAM(S): 80 TABLET, FILM COATED ORAL at 20:37

## 2021-01-30 RX ADMIN — PANTOPRAZOLE SODIUM 40 MILLIGRAM(S): 20 TABLET, DELAYED RELEASE ORAL at 05:41

## 2021-01-30 RX ADMIN — Medication 81 MILLIGRAM(S): at 09:06

## 2021-01-30 RX ADMIN — Medication 2: at 11:58

## 2021-01-30 NOTE — PROVIDER CONTACT NOTE (OTHER) - SITUATION
Repeat lactate came back at 1.0
Tele  to put patient on list for  visit.
Pt noted to have elevated lactate of 3.6

## 2021-01-30 NOTE — PROVIDER CONTACT NOTE (OTHER) - BACKGROUND
Pt arrived to ED with lactate of 3.4 bolus administered, repeat lactate 3.1. Fluids infusing and lactate repeated at 1930 is 3.6
Pt admitted for weakness and decreased appetite. Pt lactate level was elevated at 3.6. provider ordered Bolus of NS 500mL over 2 hrs; then NS 1L @ 150mL/hr and repeat lactate at 0100.

## 2021-01-31 PROCEDURE — 99232 SBSQ HOSP IP/OBS MODERATE 35: CPT

## 2021-01-31 RX ORDER — POLYETHYLENE GLYCOL 3350 17 G/17G
17 POWDER, FOR SOLUTION ORAL DAILY
Refills: 0 | Status: DISCONTINUED | OUTPATIENT
Start: 2021-01-31 | End: 2021-02-02

## 2021-01-31 RX ORDER — POLYETHYLENE GLYCOL 3350 17 G/17G
17 POWDER, FOR SOLUTION ORAL ONCE
Refills: 0 | Status: COMPLETED | OUTPATIENT
Start: 2021-01-31 | End: 2021-01-31

## 2021-01-31 RX ORDER — SENNA PLUS 8.6 MG/1
2 TABLET ORAL AT BEDTIME
Refills: 0 | Status: DISCONTINUED | OUTPATIENT
Start: 2021-01-31 | End: 2021-02-02

## 2021-01-31 RX ADMIN — LOSARTAN POTASSIUM 50 MILLIGRAM(S): 100 TABLET, FILM COATED ORAL at 08:53

## 2021-01-31 RX ADMIN — ATORVASTATIN CALCIUM 80 MILLIGRAM(S): 80 TABLET, FILM COATED ORAL at 21:31

## 2021-01-31 RX ADMIN — Medication 81 MILLIGRAM(S): at 08:53

## 2021-01-31 RX ADMIN — SENNA PLUS 2 TABLET(S): 8.6 TABLET ORAL at 21:31

## 2021-01-31 RX ADMIN — ENOXAPARIN SODIUM 40 MILLIGRAM(S): 100 INJECTION SUBCUTANEOUS at 08:53

## 2021-01-31 RX ADMIN — CEFTRIAXONE 1000 MILLIGRAM(S): 500 INJECTION, POWDER, FOR SOLUTION INTRAMUSCULAR; INTRAVENOUS at 11:29

## 2021-01-31 RX ADMIN — Medication 12.5 MILLIGRAM(S): at 08:53

## 2021-01-31 RX ADMIN — PANTOPRAZOLE SODIUM 40 MILLIGRAM(S): 20 TABLET, DELAYED RELEASE ORAL at 06:12

## 2021-01-31 RX ADMIN — POLYETHYLENE GLYCOL 3350 17 GRAM(S): 17 POWDER, FOR SOLUTION ORAL at 11:29

## 2021-01-31 RX ADMIN — Medication 2: at 17:00

## 2021-01-31 RX ADMIN — Medication 4: at 12:05

## 2021-02-01 ENCOUNTER — TRANSCRIPTION ENCOUNTER (OUTPATIENT)
Age: 86
End: 2021-02-01

## 2021-02-01 LAB
ANION GAP SERPL CALC-SCNC: 4 MMOL/L — LOW (ref 5–17)
BUN SERPL-MCNC: 13 MG/DL — SIGNIFICANT CHANGE UP (ref 7–23)
CALCIUM SERPL-MCNC: 9 MG/DL — SIGNIFICANT CHANGE UP (ref 8.5–10.1)
CHLORIDE SERPL-SCNC: 110 MMOL/L — HIGH (ref 96–108)
CO2 SERPL-SCNC: 29 MMOL/L — SIGNIFICANT CHANGE UP (ref 22–31)
CREAT SERPL-MCNC: 0.7 MG/DL — SIGNIFICANT CHANGE UP (ref 0.5–1.3)
GLUCOSE SERPL-MCNC: 130 MG/DL — HIGH (ref 70–99)
HCT VFR BLD CALC: 31.5 % — LOW (ref 34.5–45)
HGB BLD-MCNC: 9.8 G/DL — LOW (ref 11.5–15.5)
MCHC RBC-ENTMCNC: 27.6 PG — SIGNIFICANT CHANGE UP (ref 27–34)
MCHC RBC-ENTMCNC: 31.1 GM/DL — LOW (ref 32–36)
MCV RBC AUTO: 88.7 FL — SIGNIFICANT CHANGE UP (ref 80–100)
PLATELET # BLD AUTO: 247 K/UL — SIGNIFICANT CHANGE UP (ref 150–400)
POTASSIUM SERPL-MCNC: 4.1 MMOL/L — SIGNIFICANT CHANGE UP (ref 3.5–5.3)
POTASSIUM SERPL-SCNC: 4.1 MMOL/L — SIGNIFICANT CHANGE UP (ref 3.5–5.3)
RBC # BLD: 3.55 M/UL — LOW (ref 3.8–5.2)
RBC # FLD: 13.5 % — SIGNIFICANT CHANGE UP (ref 10.3–14.5)
SODIUM SERPL-SCNC: 143 MMOL/L — SIGNIFICANT CHANGE UP (ref 135–145)
WBC # BLD: 8.71 K/UL — SIGNIFICANT CHANGE UP (ref 3.8–10.5)
WBC # FLD AUTO: 8.71 K/UL — SIGNIFICANT CHANGE UP (ref 3.8–10.5)

## 2021-02-01 PROCEDURE — 99232 SBSQ HOSP IP/OBS MODERATE 35: CPT

## 2021-02-01 RX ORDER — SODIUM CHLORIDE 9 MG/ML
1000 INJECTION, SOLUTION INTRAVENOUS
Refills: 0 | Status: DISCONTINUED | OUTPATIENT
Start: 2021-02-01 | End: 2021-02-02

## 2021-02-01 RX ADMIN — Medication 0: at 21:33

## 2021-02-01 RX ADMIN — Medication 2: at 12:52

## 2021-02-01 RX ADMIN — Medication 81 MILLIGRAM(S): at 10:23

## 2021-02-01 RX ADMIN — Medication 12.5 MILLIGRAM(S): at 10:24

## 2021-02-01 RX ADMIN — CEFTRIAXONE 1000 MILLIGRAM(S): 500 INJECTION, POWDER, FOR SOLUTION INTRAMUSCULAR; INTRAVENOUS at 10:25

## 2021-02-01 RX ADMIN — POLYETHYLENE GLYCOL 3350 17 GRAM(S): 17 POWDER, FOR SOLUTION ORAL at 10:23

## 2021-02-01 RX ADMIN — SODIUM CHLORIDE 80 MILLILITER(S): 9 INJECTION, SOLUTION INTRAVENOUS at 15:07

## 2021-02-01 RX ADMIN — PANTOPRAZOLE SODIUM 40 MILLIGRAM(S): 20 TABLET, DELAYED RELEASE ORAL at 05:54

## 2021-02-01 RX ADMIN — ATORVASTATIN CALCIUM 80 MILLIGRAM(S): 80 TABLET, FILM COATED ORAL at 21:25

## 2021-02-01 RX ADMIN — ENOXAPARIN SODIUM 40 MILLIGRAM(S): 100 INJECTION SUBCUTANEOUS at 10:23

## 2021-02-01 RX ADMIN — LOSARTAN POTASSIUM 50 MILLIGRAM(S): 100 TABLET, FILM COATED ORAL at 10:24

## 2021-02-01 RX ADMIN — SENNA PLUS 2 TABLET(S): 8.6 TABLET ORAL at 21:25

## 2021-02-01 RX ADMIN — Medication 2: at 16:53

## 2021-02-01 NOTE — DISCHARGE NOTE NURSING/CASE MANAGEMENT/SOCIAL WORK - PATIENT PORTAL LINK FT
You can access the FollowMyHealth Patient Portal offered by [facility] by registering at the following website: [email or web address]. By joining [employer]’s FollowMyHealth portal, you will also be able to view your health information using other applications (apps) compatible with our system.

## 2021-02-01 NOTE — PROGRESS NOTE ADULT - SUBJECTIVE AND OBJECTIVE BOX
86 y.o. female with PMHx of DMII, Breast CA, HTN, HLD, CAD s/p distant PCI with ROSARIO p/w chills/fever/weakness and inability to ambulate. Pt states started last night and couldn't walk due to weakness in LE in am associated with numbness of arms/legs - now improved.      21: Patient seen and examined. Feels a little better today. Discussed with patient regarding management plan.       Vital Signs Last 24 Hrs  T(C): 36.2 (2021 20:45), Max: 38 (2021 13:36)  T(F): 97.2 (2021 20:45), Max: 100.4 (2021 13:36)  HR: 84 (2021 07:54) (84 - 105)  BP: 128/61 (2021 07:54) (128/61 - 148/61)  BP(mean): --  RR: 18 (2021 20:45) (17 - 20)  SpO2: 99% (2021 07:54) (96% - 100%)       PHYSICAL EXAM:    General: elderly female in no acute distress  Eyes: PERRLA, EOMI; conjunctiva and sclera clear  Head: Normocephalic; atraumatic  ENMT: No nasal discharge; airway clear  Neck: Supple; non tender; no masses  Respiratory: No wheezes, rales or rhonchi  Cardiovascular: S1, S2 eg with II/VI LYLY  Gastrointestinal: Soft abd with no tenderness, + BS, mild distention  Genitourinary: No costovertebral angle tenderness  Extremities: No clubbing, cyanosis, trace BL LE edema, + RUE lymphedema  Vascular: Peripheral pulses palpable 2+ bilaterally  Neurological: Alert and oriented x4  Skin: Warm and dry.   Musculoskeletal: Normal tone, without deformities  Psychiatric: Cooperative and appropriate.                              9.6    10.17 )-----------( 197      ( 2021 08:32 )             31.0     2021 08:32    141    |  114    |  18     ----------------------------<  133    4.2     |  21     |  0.72     Ca    8.3        2021 08:32  Phos  3.1       2021 13:33  Mg     1.6       2021 13:33    TPro  7.4    /  Alb  2.9    /  TBili  0.3    /  DBili  x      /  AST  12     /  ALT  17     /  AlkPhos  113    2021 13:33    LIVER FUNCTIONS - ( 2021 13:33 )  Alb: 2.9 g/dL / Pro: 7.4 gm/dL / ALK PHOS: 113 U/L / ALT: 17 U/L / AST: 12 U/L / GGT: x             CAPILLARY BLOOD GLUCOSE      POCT Blood Glucose.: 99 mg/dL (2021 07:57)  POCT Blood Glucose.: 109 mg/dL (2021 20:40)  POCT Blood Glucose.: 92 mg/dL (2021 16:59)    CARDIAC MARKERS ( 2021 13:33 )  <0.015 ng/mL / x     / x     / x     / x          Urinalysis Basic - ( 2021 13:33 )    Color: Yellow / Appearance: Slightly Turbid / S.015 / pH: x  Gluc: x / Ketone: Negative  / Bili: Negative / Urobili: Negative mg/dL   Blood: x / Protein: 30 mg/dL / Nitrite: Negative   Leuk Esterase: Trace / RBC: 11-25 /HPF / WBC 0-2   Sq Epi: x / Non Sq Epi: Few / Bacteria: Moderate                  MEDICATIONS  (STANDING):  aspirin  chewable 81 milliGRAM(s) Oral daily  atorvastatin 80 milliGRAM(s) Oral at bedtime  cefTRIAXone Injectable. 1000 milliGRAM(s) IV Push every 24 hours  dextrose 40% Gel 15 Gram(s) Oral once  dextrose 5%. 1000 milliLiter(s) (50 mL/Hr) IV Continuous <Continuous>  dextrose 5%. 1000 milliLiter(s) (100 mL/Hr) IV Continuous <Continuous>  dextrose 50% Injectable 25 Gram(s) IV Push once  dextrose 50% Injectable 12.5 Gram(s) IV Push once  dextrose 50% Injectable 25 Gram(s) IV Push once  enoxaparin Injectable 40 milliGRAM(s) SubCutaneous daily  glucagon  Injectable 1 milliGRAM(s) IntraMuscular once  insulin lispro (ADMELOG) corrective regimen sliding scale   SubCutaneous three times a day before meals  insulin lispro (ADMELOG) corrective regimen sliding scale   SubCutaneous at bedtime  losartan 50 milliGRAM(s) Oral daily  meclizine 12.5 milliGRAM(s) Oral daily  pantoprazole    Tablet 40 milliGRAM(s) Oral before breakfast    MEDICATIONS  (PRN):            Assessment and Plan:   Assessment:  · Assessment	  86 y.o. female with PMHx of DMII, Breast CA, HTN, HLD, CAD s/p distant PCI with ROSARIO p/w chills/fever/weakness and inability to ambulate. Pt states started last night and couldn't walk due to weakness in LE in am associated with numbness of arms/legs - now improved.    1. Sepsis due to  UTI as an etiology w/o septic shock   - f/u UCx/BCx   - cont IV abx, IV rocephin   - S/P IV fluid  -Lactate normalized    2. DMII -   HISS    3. HTN/HLD/CAD -   cont current meds    4. VTE proph - LMWH    5. GOC - full code                                                                              
RN called ot notify that Repeat Lactate 3.6  VSS.    D.w RN to start IVF NS bolus 500 ml and then c/w NS gentle hydration. Repeat Lactate ordered. c./w ABx.     ICU Vital Signs Last 24 Hrs  T(C): 36.8 (29 Jan 2021 18:18), Max: 38 (29 Jan 2021 13:36)  T(F): 98.3 (29 Jan 2021 18:18), Max: 100.4 (29 Jan 2021 13:36)  HR: 96 (29 Jan 2021 18:18) (93 - 105)  BP: 136/55 (29 Jan 2021 18:18) (129/53 - 140/57)  RR: 18 (29 Jan 2021 18:18) (17 - 20)  SpO2: 100% (29 Jan 2021 18:18) (96% - 100%)    Plan d/w RN. 
86 y.o. female with PMHx of DMII, Breast CA, HTN, HLD, CAD s/p distant PCI with ROSARIO p/w chills/fever/weakness and inability to ambulate. Pt states started last night and couldn't walk due to weakness in LE in am associated with numbness of arms/legs - now improved.      21: Patient seen and examined. Feels a little better today. Discussed with patient regarding management plan.   21: Sitting in a chair, feels better. Discussed with daughter  regarding management and d/c plan.       Vital Signs Last 24 Hrs  T(C): 36.6 (2021 08:31), Max: 36.6 (2021 08:31)  T(F): 97.9 (2021 08:31), Max: 97.9 (2021 08:31)  HR: 86 (2021 08:31) (80 - 86)  BP: 147/70 (2021 08:31) (94/43 - 147/70)  BP(mean): --  RR: 18 (2021 20:44) (18 - 18)  SpO2: 99% (2021 08:31) (95% - 99%)       PHYSICAL EXAM:    General: elderly female in no acute distress  Eyes: PERRLA, EOMI; conjunctiva and sclera clear  Head: Normocephalic; atraumatic  ENMT: No nasal discharge; airway clear  Neck: Supple; non tender; no masses  Respiratory: No wheezes, rales or rhonchi  Cardiovascular: S1, S2 eg with II/VI LYLY  Gastrointestinal: Soft abd with no tenderness, + BS, mild distention  Genitourinary: No costovertebral angle tenderness  Extremities: No clubbing, cyanosis, trace BL LE edema, + RUE lymphedema  Vascular: Peripheral pulses palpable 2+ bilaterally  Neurological: Alert and oriented x4  Skin: Warm and dry.   Musculoskeletal: Normal tone, without deformities  Psychiatric: Cooperative and appropriate.                              9.6    10.17 )-----------( 197      ( 2021 08:32 )             31.0     2021 08:32    141    |  114    |  18     ----------------------------<  133    4.2     |  21     |  0.72     Ca    8.3        2021 08:32  Phos  3.1       2021 13:33  Mg     1.6       2021 13:33    TPro  7.4    /  Alb  2.9    /  TBili  0.3    /  DBili  x      /  AST  12     /  ALT  17     /  AlkPhos  113    2021 13:33    LIVER FUNCTIONS - ( 2021 13:33 )  Alb: 2.9 g/dL / Pro: 7.4 gm/dL / ALK PHOS: 113 U/L / ALT: 17 U/L / AST: 12 U/L / GGT: x             CAPILLARY BLOOD GLUCOSE      POCT Blood Glucose.: 99 mg/dL (2021 07:57)  POCT Blood Glucose.: 109 mg/dL (2021 20:40)  POCT Blood Glucose.: 92 mg/dL (2021 16:59)    CARDIAC MARKERS ( 2021 13:33 )  <0.015 ng/mL / x     / x     / x     / x          Urinalysis Basic - ( 2021 13:33 )    Color: Yellow / Appearance: Slightly Turbid / S.015 / pH: x  Gluc: x / Ketone: Negative  / Bili: Negative / Urobili: Negative mg/dL   Blood: x / Protein: 30 mg/dL / Nitrite: Negative   Leuk Esterase: Trace / RBC: 11-25 /HPF / WBC 0-2   Sq Epi: x / Non Sq Epi: Few / Bacteria: Moderate          MEDICATIONS  (STANDING):  aspirin  chewable 81 milliGRAM(s) Oral daily  atorvastatin 80 milliGRAM(s) Oral at bedtime  cefTRIAXone Injectable. 1000 milliGRAM(s) IV Push every 24 hours  dextrose 40% Gel 15 Gram(s) Oral once  dextrose 5%. 1000 milliLiter(s) (50 mL/Hr) IV Continuous <Continuous>  dextrose 5%. 1000 milliLiter(s) (100 mL/Hr) IV Continuous <Continuous>  dextrose 50% Injectable 25 Gram(s) IV Push once  dextrose 50% Injectable 12.5 Gram(s) IV Push once  dextrose 50% Injectable 25 Gram(s) IV Push once  enoxaparin Injectable 40 milliGRAM(s) SubCutaneous daily  glucagon  Injectable 1 milliGRAM(s) IntraMuscular once  insulin lispro (ADMELOG) corrective regimen sliding scale   SubCutaneous three times a day before meals  insulin lispro (ADMELOG) corrective regimen sliding scale   SubCutaneous at bedtime  losartan 50 milliGRAM(s) Oral daily  meclizine 12.5 milliGRAM(s) Oral daily  pantoprazole    Tablet 40 milliGRAM(s) Oral before breakfast    MEDICATIONS  (PRN):            Assessment and Plan:   Assessment:  · Assessment	  86 y.o. female with PMHx of DMII, Breast CA, HTN, HLD, CAD s/p distant PCI with ROSARIO p/w chills/fever/weakness and inability to ambulate. Pt states started last night and couldn't walk due to weakness in LE in am associated with numbness of arms/legs - now improved.    1. Sepsis due to  UTI as an etiology w/o septic shock   - Urine cultures: contaminated  -Blood cultures-no growth   - cont IV abx, IV rocephin fro one more day and then discontinue   - S/P IV fluid  -Lactate normalized    2. DMII - stable  HISS    3. HTN/HLD/CAD -   cont current meds    4. VTE proph - LMWH    5. GOC - full code    Disposition: PT eval pending  Possible home in 1 or 2 days time.                                                                               
Progress Note:   · Provider Specialty	Hospitalist    Reason for Admission:   Reason for Admission:  · Reason for Admission	Chills/weakness      · Subjective and Objective:   86 y.o. female with PMHx of DMII, Breast CA, HTN, HLD, CAD s/p distant PCI with ROSARIO p/w chills/fever/weakness and inability to ambulate. Pt states started last night and couldn't walk due to weakness in LE in am associated with numbness of arms/legs - now improved.      21: Patient seen and examined. Feels a little better today. Discussed with patient regarding management plan.   21: Sitting in a chair, feels better. Discussed with daughter  regarding management and d/c plan.       Vital Signs Last 24 Hrs  T(C): 36.6 (2021 08:31), Max: 36.6 (2021 08:31)  T(F): 97.9 (2021 08:31), Max: 97.9 (2021 08:31)  HR: 86 (2021 08:31) (80 - 86)  BP: 147/70 (2021 08:31) (94/43 - 147/70)  BP(mean): --  RR: 18 (2021 20:44) (18 - 18)  SpO2: 99% (2021 08:31) (95% - 99%)       PHYSICAL EXAM:    General: elderly female in no acute distress  Eyes: PERRLA, EOMI; conjunctiva and sclera clear  Head: Normocephalic; atraumatic  ENMT: No nasal discharge; airway clear  Neck: Supple; non tender; no masses  Respiratory: No wheezes, rales or rhonchi  Cardiovascular: S1, S2 eg with II/VI LYLY  Gastrointestinal: Soft abd with no tenderness, + BS, mild distention  Genitourinary: No costovertebral angle tenderness  Extremities: No clubbing, cyanosis, trace BL LE edema, + RUE lymphedema  Vascular: Peripheral pulses palpable 2+ bilaterally  Neurological: Alert and oriented x4  Skin: Warm and dry.   Musculoskeletal: Normal tone, without deformities  Psychiatric: Cooperative and appropriate.                              9.6    10.17 )-----------( 197      ( 2021 08:32 )             31.0     2021 08:32    141    |  114    |  18     ----------------------------<  133    4.2     |  21     |  0.72     Ca    8.3        2021 08:32  Phos  3.1       2021 13:33  Mg     1.6       2021 13:33    TPro  7.4    /  Alb  2.9    /  TBili  0.3    /  DBili  x      /  AST  12     /  ALT  17     /  AlkPhos  113    2021 13:33    LIVER FUNCTIONS - ( 2021 13:33 )  Alb: 2.9 g/dL / Pro: 7.4 gm/dL / ALK PHOS: 113 U/L / ALT: 17 U/L / AST: 12 U/L / GGT: x             CAPILLARY BLOOD GLUCOSE      POCT Blood Glucose.: 99 mg/dL (2021 07:57)  POCT Blood Glucose.: 109 mg/dL (2021 20:40)  POCT Blood Glucose.: 92 mg/dL (2021 16:59)    CARDIAC MARKERS ( 2021 13:33 )  <0.015 ng/mL / x     / x     / x     / x          Urinalysis Basic - ( 2021 13:33 )    Color: Yellow / Appearance: Slightly Turbid / S.015 / pH: x  Gluc: x / Ketone: Negative  / Bili: Negative / Urobili: Negative mg/dL   Blood: x / Protein: 30 mg/dL / Nitrite: Negative   Leuk Esterase: Trace / RBC: 11-25 /HPF / WBC 0-2   Sq Epi: x / Non Sq Epi: Few / Bacteria: Moderate          MEDICATIONS  (STANDING):  aspirin  chewable 81 milliGRAM(s) Oral daily  atorvastatin 80 milliGRAM(s) Oral at bedtime  cefTRIAXone Injectable. 1000 milliGRAM(s) IV Push every 24 hours  dextrose 40% Gel 15 Gram(s) Oral once  dextrose 5%. 1000 milliLiter(s) (50 mL/Hr) IV Continuous <Continuous>  dextrose 5%. 1000 milliLiter(s) (100 mL/Hr) IV Continuous <Continuous>  dextrose 50% Injectable 25 Gram(s) IV Push once  dextrose 50% Injectable 12.5 Gram(s) IV Push once  dextrose 50% Injectable 25 Gram(s) IV Push once  enoxaparin Injectable 40 milliGRAM(s) SubCutaneous daily  glucagon  Injectable 1 milliGRAM(s) IntraMuscular once  insulin lispro (ADMELOG) corrective regimen sliding scale   SubCutaneous three times a day before meals  insulin lispro (ADMELOG) corrective regimen sliding scale   SubCutaneous at bedtime  losartan 50 milliGRAM(s) Oral daily  meclizine 12.5 milliGRAM(s) Oral daily  pantoprazole    Tablet 40 milliGRAM(s) Oral before breakfast    MEDICATIONS  (PRN):            Assessment and Plan:  86 y.o. female with PMHx of DMII, Breast CA, HTN, HLD, CAD s/p distant PCI with ROSARIO p/w chills/fever/weakness and inability to ambulate. Pt states started last night and couldn't walk due to weakness in LE in am associated with numbness of arms/legs - now improved.    1. Sepsis due to  UTI as an etiology w/o septic shock   - Urine cultures: contaminated  -Blood cultures-no growth   -s/p IV course of antibiotics w/ ceftriaxone   - S/P IV fluid  -Lactate normalized    2. DMII - stable  HISS    3. HTN/HLD/CAD -   cont current meds    4. VTE proph - LMWH    5. GOC - full code    Disposition:  -d/c home w/ home care once stable.  Spoke to daughter andupdated on plan of care.

## 2021-02-01 NOTE — PHYSICAL THERAPY INITIAL EVALUATION ADULT - PERTINENT HX OF CURRENT PROBLEM, REHAB EVAL
86 y.o. female with PMHx of DMII, Breast CA, HTN, HLD, CAD s/p distant PCI with ROSARIO p/w chills/fever/weakness and inability to ambulate. Pt states started last night and couldn't walk due to weakness in LE in am associated with numbness of arms/legs - now improved.

## 2021-02-02 ENCOUNTER — TRANSCRIPTION ENCOUNTER (OUTPATIENT)
Age: 86
End: 2021-02-02

## 2021-02-02 VITALS
HEART RATE: 92 BPM | SYSTOLIC BLOOD PRESSURE: 137 MMHG | TEMPERATURE: 98 F | OXYGEN SATURATION: 99 % | RESPIRATION RATE: 18 BRPM | DIASTOLIC BLOOD PRESSURE: 51 MMHG

## 2021-02-02 LAB — SARS-COV-2 RNA SPEC QL NAA+PROBE: SIGNIFICANT CHANGE UP

## 2021-02-02 PROCEDURE — 99239 HOSP IP/OBS DSCHRG MGMT >30: CPT

## 2021-02-02 RX ORDER — CEFUROXIME AXETIL 250 MG
250 TABLET ORAL EVERY 12 HOURS
Refills: 0 | Status: DISCONTINUED | OUTPATIENT
Start: 2021-02-02 | End: 2021-02-02

## 2021-02-02 RX ORDER — CEFUROXIME AXETIL 250 MG
1 TABLET ORAL
Qty: 6 | Refills: 0
Start: 2021-02-02 | End: 2021-02-04

## 2021-02-02 RX ADMIN — ENOXAPARIN SODIUM 40 MILLIGRAM(S): 100 INJECTION SUBCUTANEOUS at 08:34

## 2021-02-02 RX ADMIN — LOSARTAN POTASSIUM 50 MILLIGRAM(S): 100 TABLET, FILM COATED ORAL at 08:36

## 2021-02-02 RX ADMIN — Medication 12.5 MILLIGRAM(S): at 08:34

## 2021-02-02 RX ADMIN — Medication 81 MILLIGRAM(S): at 08:35

## 2021-02-02 RX ADMIN — POLYETHYLENE GLYCOL 3350 17 GRAM(S): 17 POWDER, FOR SOLUTION ORAL at 08:34

## 2021-02-02 RX ADMIN — Medication 250 MILLIGRAM(S): at 12:11

## 2021-02-02 RX ADMIN — SODIUM CHLORIDE 80 MILLILITER(S): 9 INJECTION, SOLUTION INTRAVENOUS at 05:36

## 2021-02-02 RX ADMIN — PANTOPRAZOLE SODIUM 40 MILLIGRAM(S): 20 TABLET, DELAYED RELEASE ORAL at 05:32

## 2021-02-02 RX ADMIN — Medication 2: at 12:11

## 2021-02-02 NOTE — DISCHARGE NOTE PROVIDER - HOSPITAL COURSE
CC: CHills/Weakness    HPI/Hospital course:  86 y.o. female with PMHx of DMII, Breast CA, HTN, HLD, CAD s/p distant PCI with ROSARIO p/w chills/fever/weakness and inability to ambulate. Pt states started last night and couldn't walk due to weakness in LE in am associated with numbness of arms/legs - now improved.  Pt found to have sepsis due to UTI.  She was given IV antibiotics ceftriaxone.  Urine culture came back contaminated.  she is also s/p IV fluids.  At this time she is afebrile, at baseline mentation.  Leukocytosis has resolved.     REVIEW OF SYSTEMS: All other review of systems is negative unless indicated above.    Vital Signs Last 24 Hrs  T(C): 36.7 (02 Feb 2021 08:21), Max: 36.7 (02 Feb 2021 08:21)  T(F): 98 (02 Feb 2021 08:21), Max: 98 (02 Feb 2021 08:21)  HR: 75 (02 Feb 2021 08:21) (75 - 81)  BP: 151/85 (02 Feb 2021 08:21) (105/69 - 151/85)  BP(mean): --  RR: 18 (01 Feb 2021 16:18) (18 - 18)  SpO2: 97% (02 Feb 2021 08:21) (95% - 97%)     PHYSICAL EXAM:    General: elderly female in no acute distress  Eyes: PERRLA, EOMI; conjunctiva and sclera clear  Head: Normocephalic; atraumatic  ENMT: No nasal discharge; airway clear  Neck: Supple; non tender; no masses  Respiratory: No wheezes, rales or rhonchi  Cardiovascular: S1, S2 eg with II/VI LYLY  Gastrointestinal: Soft abd with no tenderness, + BS, mild distention  Genitourinary: No costovertebral angle tenderness  Extremities: No clubbing, cyanosis, trace BL LE edema, + RUE lymphedema  Vascular: Peripheral pulses palpable 2+ bilaterally  Neurological: Alert and oriented x4  Skin: Warm and dry.   Musculoskeletal: Normal tone, without deformities  Psychiatric: Cooperative and appropriate.    med/labs: Reviewed and interpreted         Assessment and Plan:  86 y.o. female with PMHx of DMII, Breast CA, HTN, HLD, CAD s/p distant PCI with ROSARIO p/w chills/fever/weakness and inability to ambulate. Pt states started last night and couldn't walk due to weakness in LE in am associated with numbness of arms/legs - now improved.    1. Sepsis due to  UTI as an etiology w/o septic shock   - Urine cultures: contaminated  -Blood cultures-no growth   -s/p IV course of antibiotics w/ ceftriaxone, can switch to 3 more days oral ceftin.    - S/P IV fluid  -Lactate normalized    2. DMII - stable  HISS    3. HTN/HLD/CAD -   cont current meds    4. VTE proph - LMWH    5. GOC - full code    Disposition:  -d/c home w/ home care once.        Attending Statement: 40 minutes spent on total encounter and discharge planning.

## 2021-02-02 NOTE — DISCHARGE NOTE PROVIDER - NSDCCPCAREPLAN_GEN_ALL_CORE_FT
PRINCIPAL DISCHARGE DIAGNOSIS  Diagnosis: Acute UTI  Assessment and Plan of Treatment: Completed IV antibiotics.  Take 3 more days of oral antibiotics (sent to pharmacy)  encourage oral hydration  follow up pcp 1 week

## 2021-02-02 NOTE — DISCHARGE NOTE PROVIDER - NSDCMRMEDTOKEN_GEN_ALL_CORE_FT
aspirin 81 mg oral tablet, chewable: 1 tab(s) orally once a day  cefuroxime 250 mg oral tablet: 1 tab(s) orally every 12 hours  Crestor 20 mg oral tablet: 1 tab(s) orally once a day (at bedtime)  Fish Oil 1200 mg oral capsule: 1 cap(s) orally once a day  furosemide 40 mg oral tablet: 1 tab(s) orally once a week  ****Tuesday****  Januvia 50 mg oral tablet: 1 tab(s) orally once a day  losartan 100 mg oral tablet: 0.5 tab(s) orally once a day  meclizine 12.5 mg oral tablet: 1 tab(s) orally once a day  meloxicam 15 mg oral tablet: 1 tab(s) orally once a day  metFORMIN 500 mg oral tablet, extended release: 2 tab(s) orally 2 times a day  pantoprazole 40 mg oral delayed release tablet: 1 tab(s) orally once a day (at bedtime)

## 2021-02-03 LAB
CULTURE RESULTS: SIGNIFICANT CHANGE UP
CULTURE RESULTS: SIGNIFICANT CHANGE UP
SPECIMEN SOURCE: SIGNIFICANT CHANGE UP
SPECIMEN SOURCE: SIGNIFICANT CHANGE UP

## 2021-02-08 DIAGNOSIS — Z88.8 ALLERGY STATUS TO OTHER DRUGS, MEDICAMENTS AND BIOLOGICAL SUBSTANCES: ICD-10-CM

## 2021-02-08 DIAGNOSIS — Z79.84 LONG TERM (CURRENT) USE OF ORAL HYPOGLYCEMIC DRUGS: ICD-10-CM

## 2021-02-08 DIAGNOSIS — Z95.5 PRESENCE OF CORONARY ANGIOPLASTY IMPLANT AND GRAFT: ICD-10-CM

## 2021-02-08 DIAGNOSIS — Z79.82 LONG TERM (CURRENT) USE OF ASPIRIN: ICD-10-CM

## 2021-02-08 DIAGNOSIS — E78.5 HYPERLIPIDEMIA, UNSPECIFIED: ICD-10-CM

## 2021-02-08 DIAGNOSIS — Z88.5 ALLERGY STATUS TO NARCOTIC AGENT: ICD-10-CM

## 2021-02-08 DIAGNOSIS — E11.9 TYPE 2 DIABETES MELLITUS WITHOUT COMPLICATIONS: ICD-10-CM

## 2021-02-08 DIAGNOSIS — A41.9 SEPSIS, UNSPECIFIED ORGANISM: ICD-10-CM

## 2021-02-08 DIAGNOSIS — I10 ESSENTIAL (PRIMARY) HYPERTENSION: ICD-10-CM

## 2021-02-08 DIAGNOSIS — N39.0 URINARY TRACT INFECTION, SITE NOT SPECIFIED: ICD-10-CM

## 2021-02-08 DIAGNOSIS — Z85.3 PERSONAL HISTORY OF MALIGNANT NEOPLASM OF BREAST: ICD-10-CM

## 2021-02-08 DIAGNOSIS — I25.10 ATHEROSCLEROTIC HEART DISEASE OF NATIVE CORONARY ARTERY WITHOUT ANGINA PECTORIS: ICD-10-CM

## 2021-02-08 DIAGNOSIS — Z91.041 RADIOGRAPHIC DYE ALLERGY STATUS: ICD-10-CM

## 2021-05-21 ENCOUNTER — INPATIENT (INPATIENT)
Facility: HOSPITAL | Age: 86
LOS: 3 days | Discharge: NURSING, REHAB OR LONG-TERM CARE | DRG: 544 | End: 2021-05-25
Attending: INTERNAL MEDICINE | Admitting: INTERNAL MEDICINE
Payer: MEDICARE

## 2021-05-21 VITALS
HEART RATE: 102 BPM | RESPIRATION RATE: 18 BRPM | OXYGEN SATURATION: 97 % | WEIGHT: 169.98 LBS | SYSTOLIC BLOOD PRESSURE: 130 MMHG | DIASTOLIC BLOOD PRESSURE: 46 MMHG | HEIGHT: 61 IN | TEMPERATURE: 98 F
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
## 2021-05-21 DIAGNOSIS — Z91.041 RADIOGRAPHIC DYE ALLERGY STATUS: ICD-10-CM

## 2021-05-21 DIAGNOSIS — I10 ESSENTIAL (PRIMARY) HYPERTENSION: ICD-10-CM

## 2021-05-21 DIAGNOSIS — E78.5 HYPERLIPIDEMIA, UNSPECIFIED: ICD-10-CM

## 2021-05-21 DIAGNOSIS — E11.40 TYPE 2 DIABETES MELLITUS WITH DIABETIC NEUROPATHY, UNSPECIFIED: ICD-10-CM

## 2021-05-21 DIAGNOSIS — Z95.5 PRESENCE OF CORONARY ANGIOPLASTY IMPLANT AND GRAFT: ICD-10-CM

## 2021-05-21 DIAGNOSIS — I25.10 ATHEROSCLEROTIC HEART DISEASE OF NATIVE CORONARY ARTERY WITHOUT ANGINA PECTORIS: ICD-10-CM

## 2021-05-21 DIAGNOSIS — D63.8 ANEMIA IN OTHER CHRONIC DISEASES CLASSIFIED ELSEWHERE: ICD-10-CM

## 2021-05-21 DIAGNOSIS — M80.051A AGE-RELATED OSTEOPOROSIS WITH CURRENT PATHOLOGICAL FRACTURE, RIGHT FEMUR, INITIAL ENCOUNTER FOR FRACTURE: ICD-10-CM

## 2021-05-21 DIAGNOSIS — M17.0 BILATERAL PRIMARY OSTEOARTHRITIS OF KNEE: ICD-10-CM
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
## 2021-05-21 DIAGNOSIS — M80.072A AGE-RELATED OSTEOPOROSIS WITH CURRENT PATHOLOGICAL FRACTURE, LEFT ANKLE AND FOOT, INITIAL ENCOUNTER FOR FRACTURE: ICD-10-CM
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
## 2021-05-21 LAB
ALBUMIN SERPL ELPH-MCNC: 3.2 G/DL — LOW (ref 3.3–5)
ALP SERPL-CCNC: 84 U/L — SIGNIFICANT CHANGE UP (ref 40–120)
ALT FLD-CCNC: 20 U/L — SIGNIFICANT CHANGE UP (ref 12–78)
ANION GAP SERPL CALC-SCNC: 7 MMOL/L — SIGNIFICANT CHANGE UP (ref 5–17)
APTT BLD: 27.3 SEC — LOW (ref 27.5–35.5)
AST SERPL-CCNC: 14 U/L — LOW (ref 15–37)
BASOPHILS # BLD AUTO: 0.06 K/UL — SIGNIFICANT CHANGE UP (ref 0–0.2)
BASOPHILS NFR BLD AUTO: 0.4 % — SIGNIFICANT CHANGE UP (ref 0–2)
BILIRUB SERPL-MCNC: 0.3 MG/DL — SIGNIFICANT CHANGE UP (ref 0.2–1.2)
BUN SERPL-MCNC: 51 MG/DL — HIGH (ref 7–23)
CALCIUM SERPL-MCNC: 9.1 MG/DL — SIGNIFICANT CHANGE UP (ref 8.5–10.1)
CHLORIDE SERPL-SCNC: 111 MMOL/L — HIGH (ref 96–108)
CO2 SERPL-SCNC: 23 MMOL/L — SIGNIFICANT CHANGE UP (ref 22–31)
CREAT SERPL-MCNC: 1.3 MG/DL — SIGNIFICANT CHANGE UP (ref 0.5–1.3)
EOSINOPHIL # BLD AUTO: 0.2 K/UL — SIGNIFICANT CHANGE UP (ref 0–0.5)
EOSINOPHIL NFR BLD AUTO: 1.4 % — SIGNIFICANT CHANGE UP (ref 0–6)
GLUCOSE SERPL-MCNC: 110 MG/DL — HIGH (ref 70–99)
HCT VFR BLD CALC: 31.1 % — LOW (ref 34.5–45)
HGB BLD-MCNC: 9.7 G/DL — LOW (ref 11.5–15.5)
IMM GRANULOCYTES NFR BLD AUTO: 0.8 % — SIGNIFICANT CHANGE UP (ref 0–1.5)
INR BLD: 1.04 RATIO — SIGNIFICANT CHANGE UP (ref 0.88–1.16)
LYMPHOCYTES # BLD AUTO: 0.94 K/UL — LOW (ref 1–3.3)
LYMPHOCYTES # BLD AUTO: 6.7 % — LOW (ref 13–44)
MCHC RBC-ENTMCNC: 26.9 PG — LOW (ref 27–34)
MCHC RBC-ENTMCNC: 31.2 GM/DL — LOW (ref 32–36)
MCV RBC AUTO: 86.1 FL — SIGNIFICANT CHANGE UP (ref 80–100)
MONOCYTES # BLD AUTO: 0.97 K/UL — HIGH (ref 0–0.9)
MONOCYTES NFR BLD AUTO: 6.9 % — SIGNIFICANT CHANGE UP (ref 2–14)
NEUTROPHILS # BLD AUTO: 11.68 K/UL — HIGH (ref 1.8–7.4)
NEUTROPHILS NFR BLD AUTO: 83.8 % — HIGH (ref 43–77)
PLATELET # BLD AUTO: 210 K/UL — SIGNIFICANT CHANGE UP (ref 150–400)
POTASSIUM SERPL-MCNC: 5.1 MMOL/L — SIGNIFICANT CHANGE UP (ref 3.5–5.3)
POTASSIUM SERPL-SCNC: 5.1 MMOL/L — SIGNIFICANT CHANGE UP (ref 3.5–5.3)
PROT SERPL-MCNC: 7.1 GM/DL — SIGNIFICANT CHANGE UP (ref 6–8.3)
PROTHROM AB SERPL-ACNC: 12.1 SEC — SIGNIFICANT CHANGE UP (ref 10.6–13.6)
RBC # BLD: 3.61 M/UL — LOW (ref 3.8–5.2)
RBC # FLD: 16 % — HIGH (ref 10.3–14.5)
SARS-COV-2 RNA SPEC QL NAA+PROBE: SIGNIFICANT CHANGE UP
SODIUM SERPL-SCNC: 141 MMOL/L — SIGNIFICANT CHANGE UP (ref 135–145)
WBC # BLD: 13.96 K/UL — HIGH (ref 3.8–10.5)
WBC # FLD AUTO: 13.96 K/UL — HIGH (ref 3.8–10.5)

## 2021-05-21 PROCEDURE — 73552 X-RAY EXAM OF FEMUR 2/>: CPT | Mod: 26,50

## 2021-05-21 PROCEDURE — 73562 X-RAY EXAM OF KNEE 3: CPT | Mod: 26,50

## 2021-05-21 PROCEDURE — 73523 X-RAY EXAM HIPS BI 5/> VIEWS: CPT | Mod: 26

## 2021-05-21 PROCEDURE — 73590 X-RAY EXAM OF LOWER LEG: CPT | Mod: 26,50

## 2021-05-21 PROCEDURE — 73610 X-RAY EXAM OF ANKLE: CPT | Mod: 26,LT

## 2021-05-21 RX ORDER — ACETAMINOPHEN 500 MG
650 TABLET ORAL ONCE
Refills: 0 | Status: COMPLETED | OUTPATIENT
Start: 2021-05-21 | End: 2021-05-21

## 2021-05-21 NOTE — ED PROVIDER NOTE - PROGRESS NOTE DETAILS
Dionicio Martinez for attending Dr. Matson: 87 y/o F PMHX DM on metformin, januvia, HTN, HLD, CAD with stent on asa, hypothyroid, vertigo, breast ca s/p radial mastectomy, lymphedema of RUE, on baby ASA daily,  presents to the ED BIBA s/p fall around 9:30 this morning. Pt reports she let go of her walker, fell on b/l knees, c/o b/l knee pain, L ankle pain. No head injury, denies hip pain. Pt was not able to walk after the fall. Pt denies knee pain prior to fall.     Physical exam:   Constitutional: elderly white F alert, NAD  Eyes: eomi, nc/at   Cardiac: RRR, nml radial pulse  Respiratory: lungs cta, respirations nml   GI: BS+, abd s/nt  MSK:  pelvis nontender/stable, neck nontender, supple w/o pain, L thigh tender w/o swelling/deformity, b/l SLR 10 degrees, b/l knees arthritic changes, good AROM, mild diffuse tenderness, joints stable, b/l shins mildly tender, L ankle mild swelling, R ankle no swelling nontender AFROM, L ankle tender, mild swelling, poor ROM due to pain, L DP pulse mildly decreased/present, L foot toes move well, motor/sensory intact Discussed with Dr. Cooper who accepted the admission. Pt to be admitted for observation to have PT consult and SW in the morning. -Jamal Oseguera PA-C

## 2021-05-21 NOTE — ED ADULT NURSE NOTE - NSIMPLEMENTINTERV_GEN_ALL_ED
Implemented All Fall Risk Interventions:  [place] to call system. Call bell, personal items and telephone within reach. Instruct patient to call for assistance. Room bathroom lighting operational. Non-slip footwear when patient is off stretcher. Physically safe environment: no spills, clutter or unnecessary equipment. Stretcher in lowest position, wheels locked, appropriate side rails in place. Provide visual cue, wrist band, yellow gown, etc. Monitor gait and stability. Monitor for mental status changes and reorient to person, place, and time. Review medications for side effects contributing to fall risk. Reinforce activity limits and safety measures with patient and family.

## 2021-05-21 NOTE — ED ADULT NURSE NOTE - NSFALLRSKASSESSTYPE_ED_ALL_ED
Pt. Pulled off BIPAP. Placed on 10L HFNC and tolerated well. Deneis any pain. Urine  output. was marginal.  Rested well all night. Updated family 4 times last night.   CT scan of abdomen this am. Initial (On Arrival)

## 2021-05-21 NOTE — ED PROVIDER NOTE - OBJECTIVE STATEMENT
85 yo female with a PMH of cad, htn, hld, dm, presents with b/l knee pain s/p fall at 930am. Pt was using her walker to walk to her chair and when she let go she had weakness to her legs and fell to the floor on her knees. Aid witnessed the fall and was holding her while she fell. Pain worse in her knees and L and L ankle. Pt has been unable to walk.

## 2021-05-21 NOTE — ED ADULT NURSE NOTE - OBJECTIVE STATEMENT
patient axox3, s/p mechanical fall this morning. patient reports mechanical fall while using walker this morning while trying to sit down into a chair onto bilateral knees and now reporting bilateral knee pain. pain worse in left knee and ankle. patient denies head strike, AC use. GCS 15. patient denies headache, n/v/d, fever, chills, cough, chest pain, SOB. patient states she has been unable to walk since fall. color good, skin warm and dry, respirations even and unlabored. patient able to speak in full sentences.

## 2021-05-21 NOTE — ED PROVIDER NOTE - CARE PLAN
Principal Discharge DX:	Closed fracture of left ankle, initial encounter  Secondary Diagnosis:	Fall at home, initial encounter  Secondary Diagnosis:	Knee injury, initial encounter

## 2021-05-21 NOTE — ED PROVIDER NOTE - ATTENDING CONTRIBUTION TO CARE
I, Titi Matson MD, personally saw the patient with the PA, and completed the key components of the history and physical exam. I then discussed the management plan with the PA.

## 2021-05-21 NOTE — ED ADULT TRIAGE NOTE - CHIEF COMPLAINT QUOTE
pt presents to ed via ems after fall this morning. pt reports mechanical fall off walker this morning onto bilateral knees and now reporting knee pain. denies head strike. initially EMS was called this morning and pt refused to go and then pain got worse and called EMS again. vitals stable gcs 15. on daily aspirin

## 2021-05-21 NOTE — ED PROVIDER NOTE - WET READ LAUNCH FT
There are no Wet Read(s) to document. There are 5 Wet Read(s) to document. There are 3 Wet Read(s) to document.

## 2021-05-21 NOTE — ED PROVIDER NOTE - CLINICAL SUMMARY MEDICAL DECISION MAKING FREE TEXT BOX
87 yo female presents with b/l knee pain s/p fall. Denies head injury, loc, neck or back pain. WIll check labs, XRs, tylenol for pain and reeval. -Jamal Oseguera PA-C

## 2021-05-21 NOTE — ED PROVIDER NOTE - MUSCULOSKELETAL MINIMAL EXAM
Mild ttp to the b/l anterior upper legs, b/l anterior lower legs and the L medial and lateral malleolus. FROM of the b/l LEs. Swelling to the RUE but has a h/o of lymphedema.

## 2021-05-21 NOTE — ED PROVIDER NOTE - WR ORDER NAME 2
Problem: Pressure Injury, Risk for  Goal: # Skin remains intact  Outcome: Outcome Met, Continue evaluating goal progress toward completion  No skin issues noted    Problem: Impaired Physical Mobility  Goal: # Bed mobility, ambulation, and ADLs are maintained or returned to baseline during hospitalization  Outcome: Outcome Met, Continue evaluating goal progress toward completion  Patient up as tolerated, with supervision.     Problem: At Risk for Falls  Goal: # Patient does not fall  Outcome: Outcome Met, Continue evaluating goal progress toward completion  Free from injury with call light in reach, bed alarm on, and frequently checked on.    Problem: Stroke: Ischemic (Transient/Permanent)  Goal: Neurological status is maintained/restored to status at baseline  Monitor neurological and mental status including symptoms of increasing intracranial pressure (headache, nausea/vomiting, or change in behavior). Hypertension (greater than 180 systolic) may also indicate a change in status related to stroke.   Outcome: Outcome Not Met, Continue to Monitor  Patient neuro's intact, weak right leg with numbness, Will continue to monitor. Vss, afeb, I&O's adequate. Up with SBA.        Xray Knee 3 Views, Bilateral

## 2021-05-21 NOTE — ED PROVIDER NOTE - CONSTITUTIONAL, MLM
normal... Well appearing, eldelry white female, awake, alert, oriented to person, place, time/situation and in no apparent distress.

## 2021-05-22 DIAGNOSIS — S82.892A OTHER FRACTURE OF LEFT LOWER LEG, INITIAL ENCOUNTER FOR CLOSED FRACTURE: ICD-10-CM

## 2021-05-22 LAB
A1C WITH ESTIMATED AVERAGE GLUCOSE RESULT: 6 % — HIGH (ref 4–5.6)
ESTIMATED AVERAGE GLUCOSE: 126 MG/DL — HIGH (ref 68–114)
HCT VFR BLD CALC: 28.1 % — LOW (ref 34.5–45)
HGB BLD-MCNC: 9 G/DL — LOW (ref 11.5–15.5)
MCHC RBC-ENTMCNC: 27 PG — SIGNIFICANT CHANGE UP (ref 27–34)
MCHC RBC-ENTMCNC: 32 GM/DL — SIGNIFICANT CHANGE UP (ref 32–36)
MCV RBC AUTO: 84.4 FL — SIGNIFICANT CHANGE UP (ref 80–100)
PLATELET # BLD AUTO: 180 K/UL — SIGNIFICANT CHANGE UP (ref 150–400)
RBC # BLD: 3.33 M/UL — LOW (ref 3.8–5.2)
RBC # FLD: 16 % — HIGH (ref 10.3–14.5)
WBC # BLD: 9.68 K/UL — SIGNIFICANT CHANGE UP (ref 3.8–10.5)
WBC # FLD AUTO: 9.68 K/UL — SIGNIFICANT CHANGE UP (ref 3.8–10.5)

## 2021-05-22 PROCEDURE — 97162 PT EVAL MOD COMPLEX 30 MIN: CPT | Mod: GP

## 2021-05-22 PROCEDURE — 85027 COMPLETE CBC AUTOMATED: CPT

## 2021-05-22 PROCEDURE — 97530 THERAPEUTIC ACTIVITIES: CPT | Mod: GP

## 2021-05-22 PROCEDURE — 86769 SARS-COV-2 COVID-19 ANTIBODY: CPT

## 2021-05-22 PROCEDURE — 99221 1ST HOSP IP/OBS SF/LOW 40: CPT

## 2021-05-22 PROCEDURE — U0003: CPT

## 2021-05-22 PROCEDURE — 12345: CPT | Mod: NC

## 2021-05-22 PROCEDURE — 99285 EMERGENCY DEPT VISIT HI MDM: CPT | Mod: 25

## 2021-05-22 PROCEDURE — U0005: CPT

## 2021-05-22 PROCEDURE — 82962 GLUCOSE BLOOD TEST: CPT

## 2021-05-22 PROCEDURE — 97116 GAIT TRAINING THERAPY: CPT | Mod: GP

## 2021-05-22 PROCEDURE — 36415 COLL VENOUS BLD VENIPUNCTURE: CPT

## 2021-05-22 PROCEDURE — 80048 BASIC METABOLIC PNL TOTAL CA: CPT

## 2021-05-22 RX ORDER — SODIUM CHLORIDE 9 MG/ML
1000 INJECTION, SOLUTION INTRAVENOUS
Refills: 0 | Status: DISCONTINUED | OUTPATIENT
Start: 2021-05-22 | End: 2021-05-25

## 2021-05-22 RX ORDER — FUROSEMIDE 40 MG
40 TABLET ORAL
Refills: 0 | Status: DISCONTINUED | OUTPATIENT
Start: 2021-05-22 | End: 2021-05-25

## 2021-05-22 RX ORDER — DEXTROSE 50 % IN WATER 50 %
25 SYRINGE (ML) INTRAVENOUS ONCE
Refills: 0 | Status: DISCONTINUED | OUTPATIENT
Start: 2021-05-22 | End: 2021-05-25

## 2021-05-22 RX ORDER — CELECOXIB 200 MG/1
200 CAPSULE ORAL DAILY
Refills: 0 | Status: DISCONTINUED | OUTPATIENT
Start: 2021-05-22 | End: 2021-05-25

## 2021-05-22 RX ORDER — ACETAMINOPHEN 500 MG
650 TABLET ORAL EVERY 6 HOURS
Refills: 0 | Status: DISCONTINUED | OUTPATIENT
Start: 2021-05-22 | End: 2021-05-25

## 2021-05-22 RX ORDER — MECLIZINE HCL 12.5 MG
12.5 TABLET ORAL DAILY
Refills: 0 | Status: DISCONTINUED | OUTPATIENT
Start: 2021-05-22 | End: 2021-05-25

## 2021-05-22 RX ORDER — DEXTROSE 50 % IN WATER 50 %
12.5 SYRINGE (ML) INTRAVENOUS ONCE
Refills: 0 | Status: DISCONTINUED | OUTPATIENT
Start: 2021-05-22 | End: 2021-05-25

## 2021-05-22 RX ORDER — PANTOPRAZOLE SODIUM 20 MG/1
40 TABLET, DELAYED RELEASE ORAL
Refills: 0 | Status: DISCONTINUED | OUTPATIENT
Start: 2021-05-22 | End: 2021-05-25

## 2021-05-22 RX ORDER — INSULIN LISPRO 100/ML
VIAL (ML) SUBCUTANEOUS
Refills: 0 | Status: DISCONTINUED | OUTPATIENT
Start: 2021-05-22 | End: 2021-05-25

## 2021-05-22 RX ORDER — HEPARIN SODIUM 5000 [USP'U]/ML
5000 INJECTION INTRAVENOUS; SUBCUTANEOUS THREE TIMES A DAY
Refills: 0 | Status: COMPLETED | OUTPATIENT
Start: 2021-05-22 | End: 2021-05-24

## 2021-05-22 RX ORDER — ATORVASTATIN CALCIUM 80 MG/1
80 TABLET, FILM COATED ORAL AT BEDTIME
Refills: 0 | Status: DISCONTINUED | OUTPATIENT
Start: 2021-05-22 | End: 2021-05-25

## 2021-05-22 RX ORDER — LOSARTAN POTASSIUM 100 MG/1
50 TABLET, FILM COATED ORAL DAILY
Refills: 0 | Status: DISCONTINUED | OUTPATIENT
Start: 2021-05-22 | End: 2021-05-25

## 2021-05-22 RX ORDER — GLUCAGON INJECTION, SOLUTION 0.5 MG/.1ML
1 INJECTION, SOLUTION SUBCUTANEOUS ONCE
Refills: 0 | Status: DISCONTINUED | OUTPATIENT
Start: 2021-05-22 | End: 2021-05-25

## 2021-05-22 RX ORDER — ASPIRIN/CALCIUM CARB/MAGNESIUM 324 MG
81 TABLET ORAL DAILY
Refills: 0 | Status: DISCONTINUED | OUTPATIENT
Start: 2021-05-22 | End: 2021-05-25

## 2021-05-22 RX ORDER — INSULIN LISPRO 100/ML
VIAL (ML) SUBCUTANEOUS AT BEDTIME
Refills: 0 | Status: DISCONTINUED | OUTPATIENT
Start: 2021-05-22 | End: 2021-05-25

## 2021-05-22 RX ORDER — DEXTROSE 50 % IN WATER 50 %
15 SYRINGE (ML) INTRAVENOUS ONCE
Refills: 0 | Status: DISCONTINUED | OUTPATIENT
Start: 2021-05-22 | End: 2021-05-25

## 2021-05-22 RX ADMIN — Medication 650 MILLIGRAM(S): at 06:15

## 2021-05-22 RX ADMIN — HEPARIN SODIUM 5000 UNIT(S): 5000 INJECTION INTRAVENOUS; SUBCUTANEOUS at 21:19

## 2021-05-22 RX ADMIN — Medication 2: at 12:52

## 2021-05-22 RX ADMIN — HEPARIN SODIUM 5000 UNIT(S): 5000 INJECTION INTRAVENOUS; SUBCUTANEOUS at 13:14

## 2021-05-22 RX ADMIN — HEPARIN SODIUM 5000 UNIT(S): 5000 INJECTION INTRAVENOUS; SUBCUTANEOUS at 06:16

## 2021-05-22 RX ADMIN — Medication 2: at 17:34

## 2021-05-22 RX ADMIN — ATORVASTATIN CALCIUM 80 MILLIGRAM(S): 80 TABLET, FILM COATED ORAL at 21:19

## 2021-05-22 RX ADMIN — Medication 81 MILLIGRAM(S): at 10:39

## 2021-05-22 RX ADMIN — LOSARTAN POTASSIUM 50 MILLIGRAM(S): 100 TABLET, FILM COATED ORAL at 10:39

## 2021-05-22 RX ADMIN — CELECOXIB 200 MILLIGRAM(S): 200 CAPSULE ORAL at 10:39

## 2021-05-22 RX ADMIN — Medication 650 MILLIGRAM(S): at 06:45

## 2021-05-22 RX ADMIN — PANTOPRAZOLE SODIUM 40 MILLIGRAM(S): 20 TABLET, DELAYED RELEASE ORAL at 06:15

## 2021-05-22 NOTE — H&P ADULT - ASSESSMENT
A/P:    1.  Left Ankle Fracture  -orthopedic service put a splint in ED  -need PT/OT eval for ambulation, as NWB in Left LE  -will follow clinically and PT eval for disposition    2.  DM  -follow Dxt  -on ISS    3.  Heparin for DVT ppx    4.  Code Status: Full code.

## 2021-05-22 NOTE — H&P ADULT - ENMT
[facility] Hospital Medicine  Progress Note    Patient Name: Katie Parham  MRN: [id number]  Patient Class: IP- Inpatient   Admission Date: 11/9/2019  Length of Stay: 1 days  Attending Physician: Kenna Gudino MD  Primary Care Provider: Kieran Reed MD        Subjective:     Principal Problem:Pneumonia of left upper lobe due to infectious organism        HPI:  Katie Parham is a 29 y.o. male that (in part)  has a past medical history of Asthma, Broken thumb, Cigarette smoker, Sickle cell anemia, and Sickle cell crisis.  has a past surgical history that includes Hand surgery (Left, 12/21/2017); spleenectomy; and ORIF mandible (01/31/2013). Presents to [facility] Emergency Department complaining of shortness of breath and pain on the left side of his chest.  Subacute onset today with progressive worsening.  Patient to take oxycodone thinking this was associated with his sickle cell anemia however he did not have any relief.  He describes a throbbing pain that is associated with coughing and worsened with exertion and deep inspiration.  Located in his right chest.  Feels generalized malaise and subjective fever.  Denies palpitations, syncope, or paresthesias.  Moderate to severe intensity.    In the emergency department routine laboratory studies, chest x-ray, EKG, cardiac enzymes were obtained.  There is concern for right upper lobe consolidation.  It is a CT of the chest was ordered which demonstrated evidence of pneumonia.  He was given intravenous antibiotics after cultures were obtained.  He was hospitalized in late September and therefore being treated for hospital community-acquired pneumonia.    Hospital medicine has been asked to admit for further evaluation and treatment.     Overview/Hospital Course:  Katie Parham is a 29 y.o. male that (in part)  has a past medical history of Asthma, Broken thumb, Cigarette smoker, Sickle cell anemia, and Sickle cell  crisis.  has a past surgical history that includes Hand surgery (Left, 12/21/2017); spleenectomy; and ORIF mandible (01/31/2013). Presents to [facility] Emergency Department complaining of shortness of breath and pain on the left side of his chest.  Subacute onset  with progressive worsening.  Patient to take oxycodone thinking this was associated with his sickle cell anemia however he did not have any relief.  He describes a throbbing pain that is associated with coughing and worsened with exertion and deep inspiration.  Located in his right chest.  Feels generalized malaise and subjective fever.  Denies palpitations, syncope, or paresthesias.  Moderate to severe intensity.  In the emergency department routine laboratory studies, chest x-ray, EKG, cardiac enzymes were obtained.  There is concern for right upper lobe consolidation.  It is a CT of the chest was ordered which demonstrated evidence of pneumonia.  He was given intravenous antibiotics after cultures were obtained.  He was hospitalized in late September and therefore being treated for hospital community-acquired pneumonia.he is on broad spectrum IV Abx with vanc and cefepime,still has leucocytosis,        Past Medical History:   Diagnosis Date    Asthma     Broken thumb     Cigarette smoker     Sickle cell anemia     Sickle cell crisis        Past Surgical History:   Procedure Laterality Date    HAND SURGERY Left 12/21/2017    ORIF    ORIF mandible  01/31/2013    spleenectomy         Review of patient's allergies indicates:   Allergen Reactions    Penicillins Anaphylaxis       Family History     None        Tobacco Use    Smoking status: Current Every Day Smoker     Packs/day: 0.50     Types: Cigarettes    Smokeless tobacco: Never Used    Tobacco comment: encouraged to quit.    Substance and Sexual Activity    Alcohol use: Not Currently     Comment: socially    Drug use: No    Sexual activity: Yes     Partners:  Female     Birth control/protection: Condom         Review of Systems   Constitutional: Positive for activity change, appetite change, chills, diaphoresis, fatigue and fever.   HENT: Negative for facial swelling, postnasal drip, rhinorrhea and sinus pressure.    Eyes: Negative for redness.   Respiratory: Positive for cough and shortness of breath. Negative for wheezing and stridor.    Cardiovascular: Positive for chest pain. Negative for palpitations and leg swelling.   Gastrointestinal: Negative for diarrhea and nausea.   Musculoskeletal: Negative for myalgias.   Skin: Negative for color change.   Neurological: Negative for syncope.   Hematological: Does not bruise/bleed easily.   Psychiatric/Behavioral: Negative for sleep disturbance.     Objective:     Vital Signs (Most Recent):  Temp: 98.2 °F (36.8 °C) (11/11/19 0723)  Pulse: 85 (11/11/19 0808)  Resp: 17 (11/11/19 0808)  BP: (!) 110/56 (11/11/19 0723)  SpO2: 95 % (11/11/19 0808) Vital Signs (24h Range):  Temp:  [97.4 °F (36.3 °C)-98.8 °F (37.1 °C)] 98.2 °F (36.8 °C)  Pulse:  [85-99] 85  Resp:  [17-18] 17  SpO2:  [92 %-97 %] 95 %  BP: (110-122)/(56-66) 110/56     Weight: 64 kg (141 lb 1.5 oz)  Body mass index is 22.77 kg/m².      Intake/Output Summary (Last 24 hours) at 11/11/2019 1034  Last data filed at 11/11/2019 0627  Gross per 24 hour   Intake 480 ml   Output 1800 ml   Net -1320 ml       Physical Exam   Constitutional: He is oriented to person, place, and time. He appears well-nourished. He appears distressed.   HENT:   Mouth/Throat: Oropharynx is clear and moist.   Eyes: Pupils are equal, round, and reactive to light. No scleral icterus.   Neck: No JVD present. No tracheal deviation present.   Cardiovascular: Normal rate, regular rhythm and intact distal pulses.   Pulmonary/Chest: Effort normal and breath sounds normal. No stridor. He has no wheezes. He has no rales. He exhibits no tenderness.   Abdominal: Bowel sounds are normal.   Musculoskeletal: Normal  range of motion. He exhibits no edema.   Neurological: He is alert and oriented to person, place, and time.   Skin: Skin is warm and dry. Capillary refill takes less than 2 seconds. No erythema.   Psychiatric: He has a normal mood and affect.   Nursing note and vitals reviewed.      Vents:       Lines/Drains/Airways     Peripheral Intravenous Line                 Peripheral IV - Single Lumen 11/10/19 0232 20 G Right Antecubital 1 day                Significant Labs:    CBC/Anemia Profile:  Recent Labs   Lab 11/10/19  0031 11/10/19  0539 11/11/19  0409   WBC 18.23* 22.10* 25.91*   HGB 9.9* 9.7* 9.7*   HCT 27.7* 27.4* 27.3*   * 820* 891*   MCV 82 83 82   RDW 17.4* 17.4* 17.8*   RETIC 3.3*  --   --         Chemistries:  Recent Labs   Lab 11/10/19  0031 11/10/19  0539 11/11/19  0409   * 137 134*   K 3.6 3.8 3.8   CL 97 98 99   CO2 26 29 29   BUN 4* 5* 4*   CREATININE 0.8 0.8 0.8   CALCIUM 9.2 9.3 9.1   ALBUMIN 3.4* 3.2* 3.0*   PROT 8.0 8.1 7.8   BILITOT 1.1* 1.0 1.0   ALKPHOS 72 76 71   ALT 22 19 16   AST 17 19 13   MG  --   --  2.1   PHOS  --   --  3.5     AFB smear/culture on 7.12.19 from bronchoscopy at [facility]  Culture, Acid Fast Bacilli Acid Fast Culture Positive (AA)     Culture, 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    This is an edited result. Previous organism was Mycobacterium species on 8/22/2019 at 1152 CDT     Acid Fast Stain Smear Negative for Acid Fast Bacilli          All pertinent labs within the past 24 hours have been reviewed.    Significant Imaging:   I have reviewed and interpreted all pertinent imaging results/findings within the past 24 hours.     CXR 11.9.19 Images personally reviewed. I agree w/ the radiologist who notes  New left upper lobe consolidation, most suggestive for pneumonia.     CT Chest 11.10.19 Images personally reviewed. I agree w/ the radiologist  who notes  Dense consolidation within the left upper lobe.  Findings are most suggestive for pneumonia given short-term interval development since prior radiograph from 10/29/2019.     CT Chest 7.10.19- care everywhere  1. Worsened left lower lobe consolidation as well as increased linear bandlike opacity in the right middle lobe and new tree-in-bud opacities within the left lung apex. Findings may reflect that of multifocal infection. Follow-up to ensure resolution.  2. Persistent left bronchial wall thickening, bronchiectasis, and mucous plugging. Follow-up to ensure resolution.  3. Persistent mediastinal lymphadenopathy.  4. No definite evidence of central.          Assessment/Plan:      * Pneumonia of left upper lobe due to infectious organism  [er CTa dn clinical presentation,he is on broad spectrum IV Abx with vanc and cefepime,still has leucocytosis,      HCAP (healthcare-associated pneumonia)  Per CT and clinical presentation,with recent Hospitalization,  he is on broad spectrum IV Abx with vanc and cefepime,still has leucocytosis,    Mild asthma without complication  Stable on nebuzlier.      Tobacco abuse  Consulted over 5 minutes,zoltan quit smoking.      Sickle cell anemia  Stable,will monitor.        VTE Risk Mitigation (From admission, onward)         Ordered     IP VTE HIGH RISK PATIENT  Once      11/10/19 0341     Place ELENA hose  Until discontinued      11/10/19 0341     Place sequential compression device  Until discontinued      11/10/19 0341                      Kenna Gudino MD  Department of Hospital Medicine   [facility]   ear L/ear R details… detailed exam

## 2021-05-22 NOTE — ED ADULT NURSE REASSESSMENT NOTE - NS ED NURSE REASSESS COMMENT FT1
pt diaper, linens changed. changed into gown and placed on purewick. boosted in bed. resting in bed with comfortable appearance. side rails up and call bell within reach.

## 2021-05-22 NOTE — PHYSICAL THERAPY INITIAL EVALUATION ADULT - PERTINENT HX OF CURRENT PROBLEM, REHAB EVAL
87 yo Female home ambulator with walker for assistive devices who presents to  ED with complain of Left ankle pain after fall from at home when walking with aide. Patient states that her legs gave out and she fell, denies any preceding Chest pain, palpitations or dizziness.  States inability to walk immediately following the injury., c/o increased pain in B/L knees (has recd injections in past)

## 2021-05-22 NOTE — PHYSICAL THERAPY INITIAL EVALUATION ADULT - ADDITIONAL COMMENTS
ambulates with rw, pt reports has become progressively weaker over pasts several months.  less active due to arthritis.

## 2021-05-22 NOTE — PHYSICAL THERAPY INITIAL EVALUATION ADULT - STRENGTHENING, PT EVAL
PT DISCHARGED VIA WC TO WIFE WAITING IN PRIVATE VEHICLE.  PT HAD ALL
BELONGINGS AND DISCHARGE PAPERWORK. R LE=SLR, AP/ B UE=Elbow flexion, shoulder flexion 10x2

## 2021-05-22 NOTE — H&P ADULT - NSHPPHYSICALEXAM_GEN_ALL_CORE
Vital Signs Last 24 Hrs  T(C): 36.7 (22 May 2021 03:45), Max: 36.8 (21 May 2021 22:35)  T(F): 98.1 (22 May 2021 03:45), Max: 98.2 (21 May 2021 22:35)  HR: 100 (22 May 2021 03:45) (100 - 102)  BP: 139/68 (22 May 2021 03:45) (126/74 - 139/68)  BP(mean): 86 (22 May 2021 03:45) (83 - 86)  RR: 18 (22 May 2021 03:45) (18 - 19)  SpO2: 97% (22 May 2021 03:45) (97% - 100%)

## 2021-05-22 NOTE — H&P ADULT - HISTORY OF PRESENT ILLNESS
87 yo Female home ambulator with walker for assistive devices who presents to  ED with complain of Left ankle pain after fall from at home when walking with aide. Patient states that her legs gave out and she fell, denies any preceding Chest pain, palpitations or dizziness. Denies Head trauma/LOC. States inability to walk immediately following the injury. Denies any numbness or tingling. Denies having any other pain elsewhere. Has received injection to the knees for osteoarthritis in the past. . No other concerns at this time. Unable to walk since the injury.

## 2021-05-22 NOTE — PROGRESS NOTE ADULT - SUBJECTIVE AND OBJECTIVE BOX
CC- s/p mechanical fall with LT ankle fracture    HPI:  87 yo Female home ambulator with walker for assistive devices who presents to  ED with complain of Left ankle pain after fall from at home when walking with aide. Patient states that her legs gave out and she fell, denies any preceding Chest pain, palpitations or dizziness. Denies Head trauma/LOC. States inability to walk immediately following the injury. Denies any numbness or tingling. Denies having any other pain elsewhere. Has received injection to the knees for osteoarthritis in the past. . No other concerns at this time. Unable to walk since the injury.  (22 May 2021 05:06)    5/22/21- pain is OK controlled. No other complaints    Review of system- All 10 systems reviewed and is as per HPI otherwise negative.     T(C): 36.6 (05-22-21 @ 09:26), Max: 36.8 (05-21-21 @ 22:35)  HR: 83 (05-22-21 @ 09:26) (83 - 102)  BP: 139/68 (05-22-21 @ 03:45) (126/74 - 139/68)  RR: 16 (05-22-21 @ 09:26) (16 - 19)  SpO2: 98% (05-22-21 @ 09:26) (97% - 100%)  Wt(kg): --    LABS:                        9.0    9.68  )-----------( 180      ( 22 May 2021 07:20 )             28.1     05-21    141  |  111<H>  |  51<H>  ----------------------------<  110<H>  5.1   |  23  |  1.30    Ca    9.1      21 May 2021 18:36    TPro  7.1  /  Alb  3.2<L>  /  TBili  0.3  /  DBili  x   /  AST  14<L>  /  ALT  20  /  AlkPhos  84  05-21    PT/INR - ( 21 May 2021 18:36 )   PT: 12.1 sec;   INR: 1.04 ratio      PTT - ( 21 May 2021 18:36 )  PTT:27.3 sec    CAPILLARY BLOOD GLUCOSE  POCT Blood Glucose.: 164 mg/dL (22 May 2021 12:30)  POCT Blood Glucose.: 142 mg/dL (22 May 2021 08:06)    RADIOLOGY & ADDITIONAL TESTS:  EXAM:  XR FEMUR 2 VIEWS BI                        EXAM:  XR HIPS BI WITH PELV 3-4V                        EXAM:  XR KNEE 3 VIEWS BI                        EXAM:  XR TIB FIB AP LAT AL 2VIEWS BI                        EXAM:  XR ANKLE COMP MIN3 VIEWS LT                        EXAM:  XR TIB FIB AP LAT AL 2VIEWS LT                        EXAM:  XR ANKLE 2 VIEWS LT                          PROCEDURE DATE:  05/21/2021      INTERPRETATION:  Clinical Information: Trauma    Technique: 3 views left ankle. 2 views left tibia-fibula. 3 views left ankle status post reduction. 3 views left knee. 3 views right knee. 2 views left tibia-fibula status post reduction. 2 views right tibia-fibula. One view pelvis. 2 views left hip. 2 views right hip. 2 views right femur. 2 views left femur.    Comparison: None    Findings/  Impression: Diffuse bone demineralization limits evaluation for subtle/nondisplaced fractures; MR may be obtained to further evaluate if symptoms persist.    -Acute oblique fracture of the left fibula, minimally displaced.  -Possible fracture of the right superior pubic ramus, nondisplaced. Correlate with CT if needed.  -There are arthritic changes involving both hip and knee joints. Small right knee joint effusion.    PHYSICAL EXAM:  GENERAL: NAD, well-groomed, well-developed  HEAD:  Atraumatic, Normocephalic  EYES: EOMI, PERRLA, conjunctiva and sclera clear  HEENT: Moist mucous membranes  NECK: Supple, No JVD  NERVOUS SYSTEM:  Alert & Oriented X3, Motor Strength 5/5 B/L upper and lower extremities; DTRs 2+ intact and symmetric  CHEST/LUNG: Clear to auscultation bilaterally; No rales, rhonchi, wheezing, or rubs  HEART: Regular rate and rhythm; No murmurs, rubs, or gallops  ABDOMEN: Soft, Nontender, Nondistended; Bowel sounds present  GENITOURINARY- Voiding, no palpable bladder  EXTREMITIES:  2+ Peripheral Pulses, No clubbing, cyanosis, or edema  MUSCULOSKELTAL- LLE in splint, moves toes  SKIN-no rash, no lesion  CNS- alert, oriented X3, non focal     Daily Height in cm: 154.94 (21 May 2021 17:57)      MEDICATIONS  (STANDING):  aspirin  chewable 81 milliGRAM(s) Oral daily  atorvastatin 80 milliGRAM(s) Oral at bedtime  celecoxib 200 milliGRAM(s) Oral daily  dextrose 40% Gel 15 Gram(s) Oral once  dextrose 5%. 1000 milliLiter(s) (50 mL/Hr) IV Continuous <Continuous>  dextrose 5%. 1000 milliLiter(s) (100 mL/Hr) IV Continuous <Continuous>  dextrose 50% Injectable 25 Gram(s) IV Push once  dextrose 50% Injectable 12.5 Gram(s) IV Push once  dextrose 50% Injectable 25 Gram(s) IV Push once  furosemide    Tablet 40 milliGRAM(s) Oral <User Schedule>  glucagon  Injectable 1 milliGRAM(s) IntraMuscular once  heparin   Injectable 5000 Unit(s) SubCutaneous three times a day  insulin lispro (ADMELOG) corrective regimen sliding scale   SubCutaneous three times a day before meals  insulin lispro (ADMELOG) corrective regimen sliding scale   SubCutaneous at bedtime  losartan 50 milliGRAM(s) Oral daily  meclizine 12.5 milliGRAM(s) Oral daily  pantoprazole    Tablet 40 milliGRAM(s) Oral before breakfast    MEDICATIONS  (PRN):  acetaminophen   Tablet .. 650 milliGRAM(s) Oral every 6 hours PRN Temp greater or equal to 38C (100.4F), Mild Pain (1 - 3)    Assessment/Plan  #LT ankle fracture 2/2 mechanical fall +osteoporosis  Ortho e4val appreciated  Reduced and in splint  Non-surgical  NWB to LLE with PT  Pain meds prn  Incentive spirometry  Bowel regimen    #Anemia likely chronic  Monitor HH    #Diabetes- BGM with ISS    #Hyperlipidemia- cont statin    #Covid- neg.     #Has help at home but only during the day time. Baseline- walker short distances with help. Needs likely placement to rehab    #Dispo- PT eval, likely [facility] on Monday. D/w pt     CC- s/p mechanical fall with LT ankle fracture/RT superior pubic ramus fracture    HPI:  85 yo Female home ambulator with walker for assistive devices who presents to  ED with complain of Left ankle pain after fall from at home when walking with aide. Patient states that her legs gave out and she fell, denies any preceding Chest pain, palpitations or dizziness. Denies Head trauma/LOC. States inability to walk immediately following the injury. Denies any numbness or tingling. Denies having any other pain elsewhere. Has received injection to the knees for osteoarthritis in the past. . No other concerns at this time. Unable to walk since the injury.  (22 May 2021 05:06)    5/22/21- pain is OK controlled. No other complaints    Review of system- All 10 systems reviewed and is as per HPI otherwise negative.     T(C): 36.6 (05-22-21 @ 09:26), Max: 36.8 (05-21-21 @ 22:35)  HR: 83 (05-22-21 @ 09:26) (83 - 102)  BP: 139/68 (05-22-21 @ 03:45) (126/74 - 139/68)  RR: 16 (05-22-21 @ 09:26) (16 - 19)  SpO2: 98% (05-22-21 @ 09:26) (97% - 100%)  Wt(kg): --    LABS:                        9.0    9.68  )-----------( 180      ( 22 May 2021 07:20 )             28.1     05-21    141  |  111<H>  |  51<H>  ----------------------------<  110<H>  5.1   |  23  |  1.30    Ca    9.1      21 May 2021 18:36    TPro  7.1  /  Alb  3.2<L>  /  TBili  0.3  /  DBili  x   /  AST  14<L>  /  ALT  20  /  AlkPhos  84  05-21    PT/INR - ( 21 May 2021 18:36 )   PT: 12.1 sec;   INR: 1.04 ratio      PTT - ( 21 May 2021 18:36 )  PTT:27.3 sec    CAPILLARY BLOOD GLUCOSE  POCT Blood Glucose.: 164 mg/dL (22 May 2021 12:30)  POCT Blood Glucose.: 142 mg/dL (22 May 2021 08:06)    RADIOLOGY & ADDITIONAL TESTS:  EXAM:  XR FEMUR 2 VIEWS BI                        EXAM:  XR HIPS BI WITH PELV 3-4V                        EXAM:  XR KNEE 3 VIEWS BI                        EXAM:  XR TIB FIB AP LAT UT 2VIEWS BI                        EXAM:  XR ANKLE COMP MIN3 VIEWS LT                        EXAM:  XR TIB FIB AP LAT UT 2VIEWS LT                        EXAM:  XR ANKLE 2 VIEWS LT                          PROCEDURE DATE:  05/21/2021      INTERPRETATION:  Clinical Information: Trauma    Technique: 3 views left ankle. 2 views left tibia-fibula. 3 views left ankle status post reduction. 3 views left knee. 3 views right knee. 2 views left tibia-fibula status post reduction. 2 views right tibia-fibula. One view pelvis. 2 views left hip. 2 views right hip. 2 views right femur. 2 views left femur.    Comparison: None    Findings/  Impression: Diffuse bone demineralization limits evaluation for subtle/nondisplaced fractures; MR may be obtained to further evaluate if symptoms persist.    -Acute oblique fracture of the left fibula, minimally displaced.  -Possible fracture of the right superior pubic ramus, nondisplaced. Correlate with CT if needed.  -There are arthritic changes involving both hip and knee joints. Small right knee joint effusion.    PHYSICAL EXAM:  GENERAL: NAD, well-groomed, well-developed  HEAD:  Atraumatic, Normocephalic  EYES: EOMI, PERRLA, conjunctiva and sclera clear  HEENT: Moist mucous membranes  NECK: Supple, No JVD  NERVOUS SYSTEM:  Alert & Oriented X3, Motor Strength 5/5 B/L upper and lower extremities; DTRs 2+ intact and symmetric  CHEST/LUNG: Clear to auscultation bilaterally; No rales, rhonchi, wheezing, or rubs  HEART: Regular rate and rhythm; No murmurs, rubs, or gallops  ABDOMEN: Soft, Nontender, Nondistended; Bowel sounds present  GENITOURINARY- Voiding, no palpable bladder  EXTREMITIES:  2+ Peripheral Pulses, No clubbing, cyanosis, or edema  MUSCULOSKELTAL- LLE in splint, moves toes  SKIN-no rash, no lesion  CNS- alert, oriented X3, non focal     Daily Height in cm: 154.94 (21 May 2021 17:57)      MEDICATIONS  (STANDING):  aspirin  chewable 81 milliGRAM(s) Oral daily  atorvastatin 80 milliGRAM(s) Oral at bedtime  celecoxib 200 milliGRAM(s) Oral daily  dextrose 40% Gel 15 Gram(s) Oral once  dextrose 5%. 1000 milliLiter(s) (50 mL/Hr) IV Continuous <Continuous>  dextrose 5%. 1000 milliLiter(s) (100 mL/Hr) IV Continuous <Continuous>  dextrose 50% Injectable 25 Gram(s) IV Push once  dextrose 50% Injectable 12.5 Gram(s) IV Push once  dextrose 50% Injectable 25 Gram(s) IV Push once  furosemide    Tablet 40 milliGRAM(s) Oral <User Schedule>  glucagon  Injectable 1 milliGRAM(s) IntraMuscular once  heparin   Injectable 5000 Unit(s) SubCutaneous three times a day  insulin lispro (ADMELOG) corrective regimen sliding scale   SubCutaneous three times a day before meals  insulin lispro (ADMELOG) corrective regimen sliding scale   SubCutaneous at bedtime  losartan 50 milliGRAM(s) Oral daily  meclizine 12.5 milliGRAM(s) Oral daily  pantoprazole    Tablet 40 milliGRAM(s) Oral before breakfast    MEDICATIONS  (PRN):  acetaminophen   Tablet .. 650 milliGRAM(s) Oral every 6 hours PRN Temp greater or equal to 38C (100.4F), Mild Pain (1 - 3)    Assessment/Plan  #LT ankle fracture 2/2 mechanical fall +osteoporosis  #RT superior pubic ramus fracture  Ortho e4val appreciated  Reduced and in splint  Non-surgical  NWB to LLE with PT  Pain meds prn  Incentive spirometry  Bowel regimen    #Anemia likely chronic  Monitor HH    #Diabetes- BGM with ISS    #Hyperlipidemia- cont statin    #Covid- neg.     #Has help at home but only during the day time. Baseline- walker short distances with help. Needs likely placement to rehab    #Dispo- PT eval, likely [facility] on Monday. D/w pt

## 2021-05-23 LAB
ANION GAP SERPL CALC-SCNC: 7 MMOL/L — SIGNIFICANT CHANGE UP (ref 5–17)
BUN SERPL-MCNC: 37 MG/DL — HIGH (ref 7–23)
CALCIUM SERPL-MCNC: 9.4 MG/DL — SIGNIFICANT CHANGE UP (ref 8.5–10.1)
CHLORIDE SERPL-SCNC: 114 MMOL/L — HIGH (ref 96–108)
CO2 SERPL-SCNC: 21 MMOL/L — LOW (ref 22–31)
COVID-19 SPIKE DOMAIN AB INTERP: NEGATIVE — SIGNIFICANT CHANGE UP
COVID-19 SPIKE DOMAIN ANTIBODY RESULT: 0.4 U/ML — SIGNIFICANT CHANGE UP
CREAT SERPL-MCNC: 1.04 MG/DL — SIGNIFICANT CHANGE UP (ref 0.5–1.3)
GLUCOSE SERPL-MCNC: 131 MG/DL — HIGH (ref 70–99)
HCT VFR BLD CALC: 28.4 % — LOW (ref 34.5–45)
HGB BLD-MCNC: 8.9 G/DL — LOW (ref 11.5–15.5)
MCHC RBC-ENTMCNC: 26.6 PG — LOW (ref 27–34)
MCHC RBC-ENTMCNC: 31.3 GM/DL — LOW (ref 32–36)
MCV RBC AUTO: 85 FL — SIGNIFICANT CHANGE UP (ref 80–100)
PLATELET # BLD AUTO: 183 K/UL — SIGNIFICANT CHANGE UP (ref 150–400)
POTASSIUM SERPL-MCNC: 4.5 MMOL/L — SIGNIFICANT CHANGE UP (ref 3.5–5.3)
POTASSIUM SERPL-SCNC: 4.5 MMOL/L — SIGNIFICANT CHANGE UP (ref 3.5–5.3)
RBC # BLD: 3.34 M/UL — LOW (ref 3.8–5.2)
RBC # FLD: 16.1 % — HIGH (ref 10.3–14.5)
SARS-COV-2 IGG+IGM SERPL QL IA: 0.4 U/ML — SIGNIFICANT CHANGE UP
SARS-COV-2 IGG+IGM SERPL QL IA: NEGATIVE — SIGNIFICANT CHANGE UP
SODIUM SERPL-SCNC: 142 MMOL/L — SIGNIFICANT CHANGE UP (ref 135–145)
WBC # BLD: 7.8 K/UL — SIGNIFICANT CHANGE UP (ref 3.8–10.5)
WBC # FLD AUTO: 7.8 K/UL — SIGNIFICANT CHANGE UP (ref 3.8–10.5)

## 2021-05-23 PROCEDURE — 99232 SBSQ HOSP IP/OBS MODERATE 35: CPT

## 2021-05-23 RX ADMIN — PANTOPRAZOLE SODIUM 40 MILLIGRAM(S): 20 TABLET, DELAYED RELEASE ORAL at 05:23

## 2021-05-23 RX ADMIN — Medication 12.5 MILLIGRAM(S): at 10:00

## 2021-05-23 RX ADMIN — CELECOXIB 200 MILLIGRAM(S): 200 CAPSULE ORAL at 10:00

## 2021-05-23 RX ADMIN — Medication 2: at 12:13

## 2021-05-23 RX ADMIN — ATORVASTATIN CALCIUM 80 MILLIGRAM(S): 80 TABLET, FILM COATED ORAL at 22:13

## 2021-05-23 RX ADMIN — LOSARTAN POTASSIUM 50 MILLIGRAM(S): 100 TABLET, FILM COATED ORAL at 10:00

## 2021-05-23 RX ADMIN — HEPARIN SODIUM 5000 UNIT(S): 5000 INJECTION INTRAVENOUS; SUBCUTANEOUS at 16:18

## 2021-05-23 RX ADMIN — HEPARIN SODIUM 5000 UNIT(S): 5000 INJECTION INTRAVENOUS; SUBCUTANEOUS at 05:23

## 2021-05-23 RX ADMIN — Medication 81 MILLIGRAM(S): at 10:00

## 2021-05-23 RX ADMIN — HEPARIN SODIUM 5000 UNIT(S): 5000 INJECTION INTRAVENOUS; SUBCUTANEOUS at 22:13

## 2021-05-23 NOTE — PROGRESS NOTE ADULT - SUBJECTIVE AND OBJECTIVE BOX
CC- s/p mechanical fall with LT ankle fracture/RT superior pubic ramus fracture    HPI:  86F, pmh of HTN, HLD, CAD/stents X 6, DMII, Diabetic Peripheral neuropathy, Osteoporosis,who presented to  ED with complain of Left ankle pain after fall from at home when walking with aide. Patient states that her legs gave out and she fell, denies any preceding Chest pain, palpitations or dizziness. Denies Head trauma/LOC. States inability to walk immediately following the injury. Denies any numbness or tingling. Denies having any other pain elsewhere. Has received injection to the knees for osteoarthritis in the past. . No other concerns at this time. Unable to walk since the injury.    She was admitted with left ankle, Right pelvic fractures. Seen by ortho, no acute surgical intervention recommended.     5/23: pt seen and examined. Wants to move and work with physical therapy. No sob/chest pain. Per d/w daughter has balance issues. Has an aide 14 hours a day.     Review of system- All 10 systems reviewed and is as per HPI otherwise negative.     Vital Signs Last 24 Hrs  T(C): 36.7 (23 May 2021 09:21), Max: 37.1 (22 May 2021 20:13)  T(F): 98.1 (23 May 2021 09:21), Max: 98.8 (22 May 2021 20:13)  HR: 89 (23 May 2021 09:21) (89 - 92)  BP: 129/52 (23 May 2021 09:21) (107/45 - 129/52)  RR: 16 (23 May 2021 09:21) (16 - 16)  SpO2: 98% (23 May 2021 09:21) (97% - 98%)    PHYSICAL EXAM:    GENERAL: Comfortable, no acute distress   HEAD:  Normocephalic, atraumatic  EYES: EOMI, PERRLA  HEENT: Moist mucous membranes  NECK: Supple, No JVD  NERVOUS SYSTEM:  Alert & Oriented X3, Motor Strength 5/5 B/L upper and lower extremities  CHEST/LUNG: Clear to auscultation bilaterally  HEART: Regular rate and rhythm  ABDOMEN: Soft, Nontender, Nondistended, Bowel sounds present  GENITOURINARY: Voiding, no palpable bladder  EXTREMITIES:   No clubbing, cyanosis, or edema  MUSCULOSKELETAL-Left ankle in splint, Right groin pain with movement.  SKIN-no rash      LABS:                        8.9    7.80  )-----------( 183      ( 23 May 2021 06:55 )             28.4     05-23    142  |  114<H>  |  37<H>  ----------------------------<  131<H>  4.5   |  21<L>  |  1.04    Ca    9.4      23 May 2021 06:55    TPro  7.1  /  Alb  3.2<L>  /  TBili  0.3  /  DBili  x   /  AST  14<L>  /  ALT  20  /  AlkPhos  84  05-21  PT/INR - ( 21 May 2021 18:36 )   PT: 12.1 sec;   INR: 1.04 ratio    PTT - ( 21 May 2021 18:36 )  PTT:27.3 sec    MEDICATIONS  (STANDING):  aspirin  chewable 81 milliGRAM(s) Oral daily  atorvastatin 80 milliGRAM(s) Oral at bedtime  celecoxib 200 milliGRAM(s) Oral daily  dextrose 40% Gel 15 Gram(s) Oral once  dextrose 5%. 1000 milliLiter(s) (50 mL/Hr) IV Continuous <Continuous>  dextrose 5%. 1000 milliLiter(s) (100 mL/Hr) IV Continuous <Continuous>  dextrose 50% Injectable 25 Gram(s) IV Push once  dextrose 50% Injectable 12.5 Gram(s) IV Push once  dextrose 50% Injectable 25 Gram(s) IV Push once  furosemide    Tablet 40 milliGRAM(s) Oral <User Schedule>  glucagon  Injectable 1 milliGRAM(s) IntraMuscular once  heparin   Injectable 5000 Unit(s) SubCutaneous three times a day  insulin lispro (ADMELOG) corrective regimen sliding scale   SubCutaneous three times a day before meals  insulin lispro (ADMELOG) corrective regimen sliding scale   SubCutaneous at bedtime  losartan 50 milliGRAM(s) Oral daily  meclizine 12.5 milliGRAM(s) Oral daily  pantoprazole    Tablet 40 milliGRAM(s) Oral before breakfast    MEDICATIONS  (PRN):  acetaminophen   Tablet .. 650 milliGRAM(s) Oral every 6 hours PRN Temp greater or equal to 38C (100.4F), Mild Pain (1 - 3)        Assessment/Plan  #LT ankle fracture 2/2 mechanical fall +osteoporosis  #RT superior pubic ramus fracture  -non surgical mx per ortho  -pain control  -NWB LLE  -physical therapy  -incentive spirometry  -bowel regimen.     #Anemia likely chronic  -Monitor HH    #HTN:  -on lasix, losartan    #HLD:  -statin    #Diabetes  - BGM with ISS    #H/o CAD/Stents:  -asa/statin      #Covid Vaccine status:  -got moderna 1st dose  4/20.   -due for 2nd dose 5/25.    #Dispo  -marci hopefully tmorrow.  -azalea hassan.   -d/w daughter.

## 2021-05-24 LAB — SARS-COV-2 RNA SPEC QL NAA+PROBE: SIGNIFICANT CHANGE UP

## 2021-05-24 PROCEDURE — 99223 1ST HOSP IP/OBS HIGH 75: CPT

## 2021-05-24 PROCEDURE — 99232 SBSQ HOSP IP/OBS MODERATE 35: CPT

## 2021-05-24 RX ORDER — CX-024414 0.2 MG/ML
0.5 INJECTION, SUSPENSION INTRAMUSCULAR ONCE
Refills: 0 | Status: COMPLETED | OUTPATIENT
Start: 2021-05-24 | End: 2021-05-25

## 2021-05-24 RX ORDER — ENOXAPARIN SODIUM 100 MG/ML
40 INJECTION SUBCUTANEOUS DAILY
Refills: 0 | Status: DISCONTINUED | OUTPATIENT
Start: 2021-05-25 | End: 2021-05-25

## 2021-05-24 RX ORDER — ENOXAPARIN SODIUM 100 MG/ML
40 INJECTION SUBCUTANEOUS
Qty: 0 | Refills: 0 | DISCHARGE
Start: 2021-05-24

## 2021-05-24 RX ADMIN — PANTOPRAZOLE SODIUM 40 MILLIGRAM(S): 20 TABLET, DELAYED RELEASE ORAL at 05:57

## 2021-05-24 RX ADMIN — HEPARIN SODIUM 5000 UNIT(S): 5000 INJECTION INTRAVENOUS; SUBCUTANEOUS at 21:16

## 2021-05-24 RX ADMIN — CELECOXIB 200 MILLIGRAM(S): 200 CAPSULE ORAL at 11:40

## 2021-05-24 RX ADMIN — ATORVASTATIN CALCIUM 80 MILLIGRAM(S): 80 TABLET, FILM COATED ORAL at 21:16

## 2021-05-24 RX ADMIN — HEPARIN SODIUM 5000 UNIT(S): 5000 INJECTION INTRAVENOUS; SUBCUTANEOUS at 05:57

## 2021-05-24 RX ADMIN — Medication 81 MILLIGRAM(S): at 11:40

## 2021-05-24 RX ADMIN — Medication 650 MILLIGRAM(S): at 14:18

## 2021-05-24 RX ADMIN — Medication 12.5 MILLIGRAM(S): at 11:41

## 2021-05-24 RX ADMIN — Medication 650 MILLIGRAM(S): at 15:18

## 2021-05-24 RX ADMIN — Medication 4: at 11:42

## 2021-05-24 RX ADMIN — LOSARTAN POTASSIUM 50 MILLIGRAM(S): 100 TABLET, FILM COATED ORAL at 11:40

## 2021-05-24 RX ADMIN — HEPARIN SODIUM 5000 UNIT(S): 5000 INJECTION INTRAVENOUS; SUBCUTANEOUS at 14:21

## 2021-05-24 RX ADMIN — Medication 2: at 07:43

## 2021-05-24 NOTE — CONSULT NOTE ADULT - SUBJECTIVE AND OBJECTIVE BOX
Patient is a 86yFemale home ambulator with walker for assistive devices who presents to  ED w/ a c/o of Left ankle pain s/p fall from at home when walking with aide. Patient states that her legs gave out and she fell, denies any preceding CP/SOB/palpitations/headache/confusion/dizziness. Denies Head trauma/LOC. States inability to walk immediately following the injury. Denies any numbness or tingling. Denies having any other pain elsewhere. Has received injection to the knees for osteoarthritis in the past. . No other orthopedic concerns at this time.    No pertinent past medical history    CAD (coronary artery disease)    Hypertension    Diabetes    HTN (hypertension)    Diabetes mellitus    HLD (hyperlipidemia)            codeine (Other)  contrast media (gadolinium-based) (Anaphylaxis)  Darvon (Other)      PHYSICAL EXAM:  T(C): 36.8 (05-21-21 @ 22:35), Max: 36.8 (05-21-21 @ 22:35)  HR: 100 (05-21-21 @ 22:35) (100 - 102)  BP: 126/74 (05-21-21 @ 22:35) (126/74 - 130/46)  RR: 19 (05-21-21 @ 22:35) (18 - 19)  SpO2: 100% (05-21-21 @ 22:35) (97% - 100%)    Gen: NAD, Resting comfortably    LLE:  Skin intact, no erythema or ecchymosis  Bony tenderness over the lateral ankle.   +EHL/FHL/TA/GSC  +SILT L3-S1  + DP  Compartments soft and compressible  No calf tenderness    Secondary Survey:   No TTP over bony prominences, SILT, palpable pulses, full/painless A/PROM, compartments soft. No TTP over spinous processes or paraspinal muscles at C/T/L spine. No palpable step off. No other injuries or complaints.      Procedure:  Under aseptic conditions, a hematoma block was administered to the fracture site using 10cc of 1% lidocaine. Closed reduction was performed and a well molded, well padded plaster splint was applied. The patient tolerated the procedure well and there we no complications. The patient's post-reduction neurvascular exam was unchanged. Post-reduction xrays demonstrated acceptable alignment.          
HPI  HPI:  87 yo Female home ambulator with walker for assistive devices who presents to  ED with complain of Left ankle pain after fall from at home when walking with aide. Patient states that her legs gave out and she fell, denies any preceding Chest pain, palpitations or dizziness. Denies Head trauma/LOC. States inability to walk immediately following the injury. Denies any numbness or tingling. Denies having any other pain elsewhere. Has received injection to the knees for osteoarthritis in the past. . No other concerns at this time. Unable to walk since the injury.  (22 May 2021 05:06)      Patient is a 86y old  Female who presents with a chief complaint of Fall and Left Ankle fracture (24 May 2021 12:04)      Consulted by Dr. Montero for VTE prophylaxis, risk stratification, and anticoagulation management.    PAST MEDICAL & SURGICAL HISTORY:  No pertinent past medical history    CAD (coronary artery disease)    Hypertension    Diabetes    HTN (hypertension)    Diabetes mellitus    HLD (hyperlipidemia)    No significant past surgical history    No significant past surgical history        Interval History:  5/24/21: Patient seen at bedside reporting 8/10 pain in left ankle asking for Tylenol. She denies any history of bleeding or clotting.    BMI: 32.1    CrCl:45    IMPROVE VTE Risk Score:  IMPROVE VTE Individual Risk Assessment          RISK                                                          Points  [  ] Previous VTE                                                3  [  ] Thrombophilia                                             2  [  ] Lower limb paralysis                                   2        (unable to hold up >15 seconds)    [  ] Current Cancer                                             2         (within 6 months)  [ x ] Immobilization > 24 hrs                              1  [  ] ICU/CCU stay > 24 hours                             1  [ x ] Age > 60                                                         1    IMPROVE VTE Score: 2      IMPROVE Bleeding Risk Score:3.5      Falls Risk:   High ( x )  Mod (  )  Low (  )      FAMILY HISTORY:  No pertinent family history in first degree relatives      Denies any personal or familial history of clotting or bleeding disorders.    Allergies    codeine (Other)  contrast media (gadolinium-based) (Anaphylaxis)  Darvon (Other)    Intolerances        REVIEW OF SYSTEMS    (  )Fever	        (  )Constipation	(  )SOB				  (  )Headache   (  )Dysuria  (  )Chills	        (  )Melena	        (  )Dyspnea on exertion (  )Dizziness    (  )Polyuria  (  )Nausea      (  )Hematochezia	(  )Cough                          (  )Syncope      (  )Hematuria  (  )Vomiting   (  )Chest Pain	        (  )Wheezing			  (  )Weakness  (  )Diarrhea    (  )Palpitations	(  )Anorexia			  ( x )Myalgia       ( x  ) Arthralgia    Pertinent positives in HPI and daily subjective. All other systems negative.    Vital Signs Last 24 Hrs  T(C): 36.7 (05-24-21 @ 09:01), Max: 36.9 (05-23-21 @ 19:46)  T(F): 98.1 (05-24-21 @ 09:01), Max: 98.4 (05-23-21 @ 19:46)  HR: 89 (05-24-21 @ 11:34) (89 - 97)  BP: 124/64 (05-24-21 @ 11:34) (123/84 - 130/49)  BP(mean): --  RR: 18 (05-24-21 @ 11:34) (16 - 18)  SpO2: 97% (05-24-21 @ 09:01) (97% - 98%)      PHYSICAL EXAM:    Constitutional: Appears Well    Neurological: A& O x 3    Skin: Warm    Respiratory and Thorax: normal effort; Breath sounds: normal; No rales/wheezing/rhonchi  	  Cardiovascular: S1, S2, regular, NMBR	    Gastrointestinal: BS + x 4Q, nontender	    Genitourinary:  Bladder nondistended, nontender    Musculoskeletal:   General Right:   no muscle/joint tenderness,   normal tone, no joint swelling,   ROM: limited	    General Left:   no muscle/joint tenderness,   normal tone, no joint swelling,   ROM: limited    LLE: acesplint    Lower extrems:   Right: no calf tenderness              negative govind's sign               + pedal pulses    Left:   no calf tenderness              negative govind's sign               + pedal pulses                          8.9    7.80  )-----------( 183      ( 23 May 2021 06:55 )             28.4       05-23    142  |  114<H>  |  37<H>  ----------------------------<  131<H>  4.5   |  21<L>  |  1.04    Ca    9.4      23 May 2021 06:55        				    MEDICATIONS  (STANDING):  aspirin  chewable 81 milliGRAM(s) Oral daily  atorvastatin 80 milliGRAM(s) Oral at bedtime  celecoxib 200 milliGRAM(s) Oral daily  coronavirus (EUA) Vaccine (MODERNA) 0.5 milliLiter(s) IntraMuscular once  dextrose 40% Gel 15 Gram(s) Oral once  dextrose 5%. 1000 milliLiter(s) IV Continuous <Continuous>  dextrose 5%. 1000 milliLiter(s) IV Continuous <Continuous>  dextrose 50% Injectable 25 Gram(s) IV Push once  dextrose 50% Injectable 12.5 Gram(s) IV Push once  dextrose 50% Injectable 25 Gram(s) IV Push once  furosemide    Tablet 40 milliGRAM(s) Oral <User Schedule>  glucagon  Injectable 1 milliGRAM(s) IntraMuscular once  heparin   Injectable 5000 Unit(s) SubCutaneous three times a day  insulin lispro (ADMELOG) corrective regimen sliding scale   SubCutaneous three times a day before meals  insulin lispro (ADMELOG) corrective regimen sliding scale   SubCutaneous at bedtime  losartan 50 milliGRAM(s) Oral daily  meclizine 12.5 milliGRAM(s) Oral daily  pantoprazole    Tablet 40 milliGRAM(s) Oral before breakfast            **Current DVT Prophylaxis:    LMWH                   (  x)  Heparin SQ           (  )  Coumadin             (  )  Xarelto                  (  )  Eliquis                   (  )  Venodynes           ( x )  Ambulation          ( x )  UFH                       (  )  ECASA                   (  )  Contraindicated  (  )

## 2021-05-24 NOTE — PROGRESS NOTE ADULT - SUBJECTIVE AND OBJECTIVE BOX
CC- s/p mechanical fall with LT ankle fracture/RT superior pubic ramus fracture    HPI:  86F, pmh of HTN, HLD, CAD/stents X 6, DMII, Diabetic Peripheral neuropathy, Osteoporosis,who presented to  ED with complain of Left ankle pain after fall from at home when walking with aide. Patient states that her legs gave out and she fell, denies any preceding Chest pain, palpitations or dizziness. Denies Head trauma/LOC. States inability to walk immediately following the injury. Denies any numbness or tingling. Denies having any other pain elsewhere. Has received injection to the knees for osteoarthritis in the past. . No other concerns at this time. Unable to walk since the injury.    She was admitted with left ankle, Right pelvic fractures. Seen by ortho, no acute surgical intervention recommended.     5/24: pt seen and examined this am. Felt a little better than yesterday. taking tylenol for pain. No sob/chest pain. Tolerating po. NO difficulty voiding.     Review of system- All 10 systems reviewed and is as per HPI otherwise negative.     Vital Signs Last 24 Hrs  T(C): 36.7 (24 May 2021 09:01), Max: 36.9 (23 May 2021 19:46)  T(F): 98.1 (24 May 2021 09:01), Max: 98.4 (23 May 2021 19:46)  HR: 92 (24 May 2021 09:01) (92 - 97)  BP: 130/49 (24 May 2021 09:01) (123/84 - 130/49)  RR: 16 (24 May 2021 09:01) (16 - 16)  SpO2: 97% (24 May 2021 09:01) (97% - 98%)  PHYSICAL EXAM:    GENERAL: Comfortable, no acute distress   HEAD:  Normocephalic, atraumatic  EYES: EOMI, PERRLA  HEENT: Moist mucous membranes  NECK: Supple, No JVD  NERVOUS SYSTEM:  Alert & Oriented X3, Motor Strength 5/5 B/L upper and lower extremities  CHEST/LUNG: Clear to auscultation bilaterally  HEART: Regular rate and rhythm  ABDOMEN: Soft, Nontender, Nondistended, Bowel sounds present  GENITOURINARY: Voiding, no palpable bladder  EXTREMITIES:   No clubbing, cyanosis, or edema  MUSCULOSKELETAL-Left ankle in splint, Right groin pain with movement.  SKIN-no rash    LABS:                        8.9    7.80  )-----------( 183      ( 23 May 2021 06:55 )             28.4     05-23    142  |  114<H>  |  37<H>  ----------------------------<  131<H>  4.5   |  21<L>  |  1.04    Ca    9.4      23 May 2021 06:55        MEDICATIONS  (STANDING):  aspirin  chewable 81 milliGRAM(s) Oral daily  atorvastatin 80 milliGRAM(s) Oral at bedtime  celecoxib 200 milliGRAM(s) Oral daily  coronavirus (EUA) Vaccine (MODERNA) 0.5 milliLiter(s) IntraMuscular once  dextrose 40% Gel 15 Gram(s) Oral once  dextrose 5%. 1000 milliLiter(s) (50 mL/Hr) IV Continuous <Continuous>  dextrose 5%. 1000 milliLiter(s) (100 mL/Hr) IV Continuous <Continuous>  dextrose 50% Injectable 25 Gram(s) IV Push once  dextrose 50% Injectable 12.5 Gram(s) IV Push once  dextrose 50% Injectable 25 Gram(s) IV Push once  furosemide    Tablet 40 milliGRAM(s) Oral <User Schedule>  glucagon  Injectable 1 milliGRAM(s) IntraMuscular once  heparin   Injectable 5000 Unit(s) SubCutaneous three times a day  insulin lispro (ADMELOG) corrective regimen sliding scale   SubCutaneous three times a day before meals  insulin lispro (ADMELOG) corrective regimen sliding scale   SubCutaneous at bedtime  losartan 50 milliGRAM(s) Oral daily  meclizine 12.5 milliGRAM(s) Oral daily  pantoprazole    Tablet 40 milliGRAM(s) Oral before breakfast    MEDICATIONS  (PRN):  acetaminophen   Tablet .. 650 milliGRAM(s) Oral every 6 hours PRN Temp greater or equal to 38C (100.4F), Mild Pain (1 - 3)      Assessment/Plan  #LT ankle fracture 2/2 mechanical fall +osteoporosis  #RT superior pubic ramus fracture  -non surgical mx per ortho  -pain control  -NWB LLE  -physical therapy  -incentive spirometry  -bowel regimen.     #Anemia likely chronic  -Monitor HH    #HTN:  -on lasix, losartan    #HLD:  -statin    #Diabetes  - BGM with ISS    #H/o CAD/Stents:  -asa/statin      #Covid Vaccine status:  -got moderna 1st dose  4/20.   -will give 2nd dose today.   -pt consented.    #Dispo  -marci once bed available.

## 2021-05-24 NOTE — CONSULT NOTE ADULT - ASSESSMENT
This is an 86 year old female sustaining pelvis and left ankle fracture s/p fall with high risk for VTE due to inability to ambulate, BMI, age, and restricted overall mobility. She is low risk for bleeding.    Plan:  ::Dc heparin SQ after last dose tonight  ::Start Lovenox 40 mg SQ daily 5/25 and continue until weightbearing  ::Daily CBC/BMP  ::Venodynes  ::Activity per PT and ortho    Thank you for this consult will continue to follow.  Dispo: rehab
A/P: 86F w/ L temple B ankle fracture    Case discussed and reviewed w/ Dr Martinez  Images showed mildly displaced L temple B ankle fracture, did not open on stress  Ankle was reduced and placed in a trilam splint and post reduction images were done.   Signs and symptoms of compartment syndrome were explained to the patient  Pt understand to seek care at ED if encountered.   No immediate surgical intervention needed but pt aware they may need surgical intervention  Analgesia prn  NWB LLE in trilam splint  DVT ppx   PT/OT as tolerated, further recs in the office  Ice and elevate as tolerated  Orthopedically stable for discharge w/ plan to FU outpatient w/ Dr Martinez, call office for apt.   Discussed with attending who is in agreement with above plan

## 2021-05-25 ENCOUNTER — TRANSCRIPTION ENCOUNTER (OUTPATIENT)
Age: 86
End: 2021-05-25

## 2021-05-25 VITALS
DIASTOLIC BLOOD PRESSURE: 52 MMHG | TEMPERATURE: 98 F | HEART RATE: 87 BPM | OXYGEN SATURATION: 99 % | SYSTOLIC BLOOD PRESSURE: 139 MMHG | RESPIRATION RATE: 16 BRPM

## 2021-05-25 PROCEDURE — 99231 SBSQ HOSP IP/OBS SF/LOW 25: CPT

## 2021-05-25 PROCEDURE — 99239 HOSP IP/OBS DSCHRG MGMT >30: CPT

## 2021-05-25 RX ORDER — ACETAMINOPHEN 500 MG
2 TABLET ORAL
Qty: 0 | Refills: 0 | DISCHARGE
Start: 2021-05-25

## 2021-05-25 RX ORDER — SITAGLIPTIN 50 MG/1
1 TABLET, FILM COATED ORAL
Qty: 0 | Refills: 0 | DISCHARGE

## 2021-05-25 RX ORDER — METFORMIN HYDROCHLORIDE 850 MG/1
2 TABLET ORAL
Qty: 0 | Refills: 0 | DISCHARGE

## 2021-05-25 RX ORDER — INSULIN LISPRO 100/ML
0 VIAL (ML) SUBCUTANEOUS
Qty: 0 | Refills: 0 | DISCHARGE
Start: 2021-05-25

## 2021-05-25 RX ADMIN — Medication 12.5 MILLIGRAM(S): at 11:08

## 2021-05-25 RX ADMIN — Medication 650 MILLIGRAM(S): at 08:32

## 2021-05-25 RX ADMIN — CELECOXIB 200 MILLIGRAM(S): 200 CAPSULE ORAL at 11:08

## 2021-05-25 RX ADMIN — LOSARTAN POTASSIUM 50 MILLIGRAM(S): 100 TABLET, FILM COATED ORAL at 11:08

## 2021-05-25 RX ADMIN — Medication 2: at 07:39

## 2021-05-25 RX ADMIN — CX-024414 0.5 MILLILITER(S): 0.2 INJECTION, SUSPENSION INTRAMUSCULAR at 10:21

## 2021-05-25 RX ADMIN — Medication 81 MILLIGRAM(S): at 11:08

## 2021-05-25 RX ADMIN — Medication 40 MILLIGRAM(S): at 11:08

## 2021-05-25 RX ADMIN — ENOXAPARIN SODIUM 40 MILLIGRAM(S): 100 INJECTION SUBCUTANEOUS at 11:08

## 2021-05-25 RX ADMIN — Medication 650 MILLIGRAM(S): at 09:32

## 2021-05-25 NOTE — DISCHARGE NOTE PROVIDER - HOSPITAL COURSE
86F, pmh of HTN, HLD, CAD/stents X 6, DMII, Diabetic Peripheral neuropathy, Osteoporosis,who presented to  ED with complain of Left ankle pain after fall from at home when walking with aide. Patient states that her legs gave out and she fell, denies any preceding Chest pain, palpitations or dizziness. Denies Head trauma/LOC. States inability to walk immediately following the injury. Denies any numbness or tingling. Denies having any other pain elsewhere. Has received injection to the knees for osteoarthritis in the past. . No other concerns at this time. Unable to walk since the injury.    She was admitted with left ankle, Right pelvic fractures. Seen by ortho, no acute surgical intervention recommended. She was seen by physical therapy and is recommended [facility]. she was also seen by a/c services and recommended lovenox for dvt px. she will be discharged to [facility] today. Of note she was due for her Moderna covid vaccine 2nd dose today which she will receive   prior to [place].    Vital Signs Last 24 Hrs  T(C): 36.9 (25 May 2021 08:48), Max: 36.9 (25 May 2021 05:45)  T(F): 98.4 (25 May 2021 08:48), Max: 98.4 (25 May 2021 05:45)  HR: 87 (25 May 2021 08:48) (78 - 90)  BP: 139/52 (25 May 2021 08:48) (105/49 - 139/52)  BP(mean): 74 (25 May 2021 08:48) (74 - 74)  RR: 16 (25 May 2021 08:48) (16 - 17)  SpO2: 99% (25 May 2021 08:48) (97% - 99%)    PHYSICAL EXAM  GENERAL: Comfortable, no acute distress   HEAD:  Normocephalic, atraumatic  EYES: EOMI, PERRLA  HEENT: Moist mucous membranes  NECK: Supple, No JVD  NERVOUS SYSTEM:  Alert & Oriented X3, NON FOCAL  CHEST/LUNG: Clear to auscultation bilaterally  HEART: Regular rate and rhythm  ABDOMEN: Soft, Nontender, Nondistended, Bowel sounds present  GENITOURINARY: Voiding, no palpable bladder  EXTREMITIES:   No clubbing, cyanosis, or edema  MUSCULOSKELETAL-Left ankle in splint, Right groin pain with movement.  SKIN-no rash    LABS:                        8.9    7.80  )-----------( 183      ( 23 May 2021 06:55 )             28.4     05-23    142  |  114<H>  |  37<H>  ----------------------------<  131<H>  4.5   |  21<L>  |  1.04    Ca    9.4      23 May 2021 06:55     Xray Tibia + Fibula Post Reduction 2 Views, Left (05.21.21 @ 21:45) >  Impression: Diffuse bone demineralization limits evaluation for subtle/nondisplaced fractures; MR may be obtained to further evaluate if symptoms persist.  -Acute oblique fracture of the left fibula, minimally displaced.  -Possible fracture of the right superior pubic ramus, nondisplaced. Correlate with CT if needed.  -There are arthritic changes involving both hip and knee joints. Small right knee joint effusion.    FINAL DIAGNOSIS:    #LT ankle fracture 2/2 mechanical fall +osteoporosis  #RT superior pubic ramus fracture  #Anemia likely chronic-OUTPT W/U  #HTN  #HLD  #Diabetes type 2  #Diabetic peripheral neuropathy  #H/o CAD/Stents    time taken for dc 45 min  d/w pt  summary to be faxed to pcp.

## 2021-05-25 NOTE — DISCHARGE NOTE NURSING/CASE MANAGEMENT/SOCIAL WORK - NSDCVIVACCINE_GEN_ALL_CORE_FT
Severe acute respiratory syndrome coronavirus 2 (SARS-CoV-2) (Coronavirus disease [COVID-19]) vaccine , 2021/5/25 10:21 , Adilene Trejo (RN)

## 2021-05-25 NOTE — DISCHARGE NOTE PROVIDER - CARE PROVIDER_API CALL
Jamal Martinez)  Orthopaedic Surgery; Surgery of the Hand  [address], [address]  [place] [address]  Phone: [phone]  Fax: [phone]  Follow Up Time: 1 week

## 2021-05-25 NOTE — PROGRESS NOTE ADULT - ASSESSMENT
This is an 86 year old female sustaining pelvis and left ankle fracture s/p fall with high risk for VTE due to inability to ambulate, BMI, age, and restricted overall mobility. She is low risk for bleeding.    Plan:  ::Lovenox 40 mg SQ daily 5/25 and continue until weightbearing  ::Daily CBC/BMP  ::Venodynes  ::Activity per PT and ortho    Will continue to follow.  Dispo: rehab

## 2021-05-25 NOTE — PROGRESS NOTE ADULT - REASON FOR ADMISSION
Fall and Left Ankle fracture

## 2021-05-25 NOTE — DISCHARGE NOTE PROVIDER - NSDCMRMEDTOKEN_GEN_ALL_CORE_FT
acetaminophen 325 mg oral tablet: 2 tab(s) orally every 6 hours, As needed, Temp greater or equal to 38C (100.4F), Mild Pain (1 - 3)  aspirin 81 mg oral tablet, chewable: 1 tab(s) orally once a day  Crestor 20 mg oral tablet: 1 tab(s) orally once a day (at bedtime)  enoxaparin: 40 milligram(s) subcutaneously once a day until weightbearing &amp; ambulating without assist  Fish Oil 1200 mg oral capsule: 1 cap(s) orally once a day  furosemide 40 mg oral tablet: 1 tab(s) orally once a week  ****Tuesday****  insulin lispro 100 units/mL injectable solution: subcutaneous 4 times a day (before meals and at bedtime)  2 Unit(s) if Glucose 151 - 200  4 Unit(s) if Glucose 201 - 250  6 Unit(s) if Glucose 251 - 300  8 Unit(s) if Glucose 301 - 350  10 Unit(s) if Glucose 351 - 400  12 Unit(s) if Glucose Greater Than 400  losartan 100 mg oral tablet: 0.5 tab(s) orally once a day  meclizine 12.5 mg oral tablet: 1 tab(s) orally once a day  meloxicam 15 mg oral tablet: 1 tab(s) orally once a day  pantoprazole 40 mg oral delayed release tablet: 1 tab(s) orally once a day (at bedtime)

## 2021-05-25 NOTE — DISCHARGE NOTE NURSING/CASE MANAGEMENT/SOCIAL WORK - NSPROEXTENSIONSOFSELF_GEN_A_NUR
clothes, gold neck chain with gold metal cross on patient, Vaccine card sent with patients belongings in orange folder, copy sent with paperwork to rehab/eyeglasses

## 2021-05-25 NOTE — DISCHARGE NOTE PROVIDER - NSDCCPCAREPLAN_GEN_ALL_CORE_FT
PRINCIPAL DISCHARGE DIAGNOSIS  Diagnosis: Closed fracture of left ankle, initial encounter  Assessment and Plan of Treatment: Non weight bearing LLE in splint.   follow up with dR. Martinez as advised      SECONDARY DISCHARGE DIAGNOSES  Diagnosis: Anemia  Assessment and Plan of Treatment: you were found to be anemic while here.   follow up with your primary care doctor for further workup

## 2021-05-25 NOTE — PROGRESS NOTE ADULT - SUBJECTIVE AND OBJECTIVE BOX
HPI:  85 yo Female home ambulator with walker for assistive devices who presents to  ED with complain of Left ankle pain after fall from at home when walking with aide. Patient states that her legs gave out and she fell, denies any preceding Chest pain, palpitations or dizziness. Denies Head trauma/LOC. States inability to walk immediately following the injury. Denies any numbness or tingling. Denies having any other pain elsewhere. Has received injection to the knees for osteoarthritis in the past. . No other concerns at this time. Unable to walk since the injury.  (22 May 2021 05:06)    Patient is a 86y old  Female who presents with a chief complaint of Fall and Left Ankle fracture (24 May 2021 12:04)    Consulted by Dr. Montero for VTE prophylaxis, risk stratification, and anticoagulation management.    PAST MEDICAL & SURGICAL HISTORY:  No pertinent past medical history    CAD (coronary artery disease)    Hypertension    Diabetes    HTN (hypertension)    Diabetes mellitus    HLD (hyperlipidemia)    No significant past surgical history    No significant past surgical history        Interval History:  5/24/21: Patient seen at bedside reporting 8/10 pain in left ankle asking for Tylenol. She denies any history of bleeding or clotting.  5/25/21: Patient seen at bedside resting in no apparent distress. To go to rehab likely today.     BMI: 32.1    CrCl:45    IMPROVE VTE Risk Score:  IMPROVE VTE Individual Risk Assessment          RISK                                                          Points  [  ] Previous VTE                                                3  [  ] Thrombophilia                                             2  [  ] Lower limb paralysis                                   2        (unable to hold up >15 seconds)    [  ] Current Cancer                                             2         (within 6 months)  [ x ] Immobilization > 24 hrs                              1  [  ] ICU/CCU stay > 24 hours                             1  [ x ] Age > 60                                                         1    IMPROVE VTE Score: 2      IMPROVE Bleeding Risk Score:3.5      Falls Risk:   High ( x )  Mod (  )  Low (  )      FAMILY HISTORY:  No pertinent family history in first degree relatives      Denies any personal or familial history of clotting or bleeding disorders.    Allergies    codeine (Other)  contrast media (gadolinium-based) (Anaphylaxis)  Darvon (Other)    Intolerances        REVIEW OF SYSTEMS    (  )Fever	        (  )Constipation	(  )SOB				  (  )Headache   (  )Dysuria  (  )Chills	        (  )Melena	        (  )Dyspnea on exertion (  )Dizziness    (  )Polyuria  (  )Nausea      (  )Hematochezia	(  )Cough                          (  )Syncope      (  )Hematuria  (  )Vomiting   (  )Chest Pain	        (  )Wheezing			  (  )Weakness  (  )Diarrhea    (  )Palpitations	(  )Anorexia			  ( x )Myalgia       ( x  ) Arthralgia    Pertinent positives in HPI and daily subjective. All other systems negative.    Vital Signs Last 24 Hrs  T(C): 36.9 (05-25-21 @ 08:48), Max: 36.9 (05-25-21 @ 05:45)  T(F): 98.4 (05-25-21 @ 08:48), Max: 98.4 (05-25-21 @ 05:45)  HR: 87 (05-25-21 @ 08:48) (78 - 90)  BP: 139/52 (05-25-21 @ 08:48) (105/49 - 139/52)  BP(mean): 74 (05-25-21 @ 08:48) (74 - 74)  RR: 16 (05-25-21 @ 08:48) (16 - 18)  SpO2: 99% (05-25-21 @ 08:48) (97% - 99%)      PHYSICAL EXAM:    Constitutional: Appears Well    Neurological: A& O x 3    Skin: Warm    Respiratory and Thorax: normal effort; Breath sounds: normal; No rales/wheezing/rhonchi  	  Cardiovascular: S1, S2, regular, NMBR	    Gastrointestinal: BS + x 4Q, nontender	    Genitourinary:  Bladder nondistended, nontender    Musculoskeletal:   General Right:   no muscle/joint tenderness,   normal tone, no joint swelling,   ROM: limited	    General Left:   no muscle/joint tenderness,   normal tone, no joint swelling,   ROM: limited    LLE: acesplint    Lower extrems:   Right: no calf tenderness              negative govind's sign               + pedal pulses    Left:   no calf tenderness              negative govind's sign               + pedal pulses                            8.9    7.80  )-----------( 183      ( 23 May 2021 06:55 )             28.4       05-23    142  |  114<H>  |  37<H>  ----------------------------<  131<H>  4.5   |  21<L>  |  1.04    Ca    9.4      23 May 2021 06:55        				    MEDICATIONS  (STANDING):  aspirin  chewable 81 milliGRAM(s) Oral daily  atorvastatin 80 milliGRAM(s) Oral at bedtime  celecoxib 200 milliGRAM(s) Oral daily  coronavirus (EUA) Vaccine (MODERNA) 0.5 milliLiter(s) IntraMuscular once  dextrose 40% Gel 15 Gram(s) Oral once  dextrose 5%. 1000 milliLiter(s) IV Continuous <Continuous>  dextrose 5%. 1000 milliLiter(s) IV Continuous <Continuous>  dextrose 50% Injectable 25 Gram(s) IV Push once  dextrose 50% Injectable 12.5 Gram(s) IV Push once  dextrose 50% Injectable 25 Gram(s) IV Push once  furosemide    Tablet 40 milliGRAM(s) Oral <User Schedule>  glucagon  Injectable 1 milliGRAM(s) IntraMuscular once  heparin   Injectable 5000 Unit(s) SubCutaneous three times a day  insulin lispro (ADMELOG) corrective regimen sliding scale   SubCutaneous three times a day before meals  insulin lispro (ADMELOG) corrective regimen sliding scale   SubCutaneous at bedtime  losartan 50 milliGRAM(s) Oral daily  meclizine 12.5 milliGRAM(s) Oral daily  pantoprazole    Tablet 40 milliGRAM(s) Oral before breakfast            **Current DVT Prophylaxis:    LMWH                   (  x)  Heparin SQ           (  )  Coumadin             (  )  Xarelto                  (  )  Eliquis                   (  )  Venodynes           ( x )  Ambulation          ( x )  UFH                       (  )  ECASA                   (  )  Contraindicated  (  )

## 2021-05-25 NOTE — DISCHARGE NOTE NURSING/CASE MANAGEMENT/SOCIAL WORK - PATIENT PORTAL LINK FT
You can access the FollowMyHealth Patient Portal offered by [facility] by registering at the following website: [email or web address]. By joining [employer]’s FollowMyHealth portal, you will also be able to view your health information using other applications (apps) compatible with our system.

## 2021-06-04 ENCOUNTER — NON-APPOINTMENT (OUTPATIENT)
Age: 86
End: 2021-06-04

## 2021-06-04 ENCOUNTER — APPOINTMENT (OUTPATIENT)
Dept: ORTHOPEDIC SURGERY | Facility: CLINIC | Age: 86
End: 2021-06-04
Payer: MEDICARE

## 2021-06-04 PROCEDURE — 97760 ORTHOTIC MGMT&TRAING 1ST ENC: CPT

## 2021-06-04 PROCEDURE — 99204 OFFICE O/P NEW MOD 45 MIN: CPT | Mod: 25,57

## 2021-06-04 PROCEDURE — 27786 TREATMENT OF ANKLE FRACTURE: CPT | Mod: LT

## 2021-06-04 NOTE — DISCUSSION/SUMMARY
[de-identified] : Today I had a lengthy discussion with the patient regarding their left ankle pain, minimally displaced left fibula fracture. I have addressed all the patient's concerns surrounding the pathology of their condition. XR films were reviewed with the patient. \par \par I recommended that the patient utilize a CAM boot. The patient was fitted for the CAM boot in the office today. The patient was educated about the boot wear pattern and utilization, as well as the timeframe to come out of the boot. She was also given full instructions for using the boot. I recommend the patient undergo a course of physical therapy for the left ankle  2-3 times a week for a total of 6-8 weeks. A prescription was given for the physical therapy today. \par \par I would like the patient to followup in 2-3 weeks with XR imaging to reassess their condition, those images maybe done at [facility] and sent to the office. This was explained in the presence of their daughter. The patient understood and verbally agreed to the treatment plan. All of their questions were answered and they were satisfied with the visit. The patient should call the office if they have any questions or experience worsening symptoms.

## 2021-06-04 NOTE — HISTORY OF PRESENT ILLNESS
[FreeTextEntry1] : INGRIS BARCENAS is a 86 year old female who presents for initial evaluation of left ankle fracture s/p fall. She was evaluated at [facility] ED and had XR images done on 5/22/2021. She presents in a wheelchair.

## 2021-06-04 NOTE — PHYSICAL EXAM
[de-identified] : General: Alert and oriented x3. In no acute distress. Pleasant in nature with a normal affect. No apparent respiratory distress.\par \par Left Ankle\par Skin: Clean, dry, intact\par Inspection: No obvious malalignment, no swelling, no effusion; no lymphadenopathy\par Pulses: 2+ DP/PT pulses\par ROM: 10 degrees of dorsiflexion, 40 degrees of plantarflexion, 10 degrees of subtalar motion\par Tenderness: + fibula tenderness. No tenderness over the lateral malleolus, no CFL/ATFL/PTFL pain. No medial malleolus pain, no deltoid ligament pain. No proximal fibular pain. No heel pain.\par Stability: Negative anterior/posterior drawer.\par Strength: 5/5 TA/GS/EHL\par Neuro: In tact to light touch throughout\par Additional tests: Negative Leal's test, Negative syndesmosis squeeze test.  [de-identified] : EXAM: XR FEMUR 2 VIEWS BI\par \par EXAM: XR HIPS BI WITH PELV 3-4V\par \par EXAM: XR KNEE 3 VIEWS BI\par \par EXAM: XR TIB FIB AP LAT FL 2VIEWS BI\par \par EXAM: XR ANKLE COMP MIN 3 VIEWS LT\par \par EXAM: XR TIB FIB AP LAT FL 2VIEWS LT\par \par EXAM: XR ANKLE 2 VIEWS LT\par \par \par PROCEDURE DATE: 05/21/2021\par \par \par \par INTERPRETATION: Clinical Information: Trauma\par \par Technique: 3 views left ankle. 2 views left tibia-fibula. 3 views left ankle status post reduction. 3 views left knee. 3 views right knee. 2 views left tibia-fibula status post reduction. 2 views right tibia-fibula. One view pelvis. 2 views left hip. 2 views right hip. 2 views right femur. 2 views left femur.\par \par Comparison: None\par \par Findings/\par Impression: Diffuse bone demineralization limits evaluation for subtle/nondisplaced fractures; MR may be obtained to further evaluate if symptoms persist.\par \par -Acute oblique fracture of the left fibula, minimally displaced.\par \par -Possible fracture of the right superior pubic ramus, nondisplaced. Correlate with CT if needed.\par \par -There are arthritic changes involving both hip and knee joints. Small right knee joint effusion.\par \par \par OLE JOYA MD; Attending Interventional Radiologist\par This document has been electronically signed. May 22 2021 8:16AM

## 2021-06-04 NOTE — ADDENDUM
[FreeTextEntry1] : I, Tam Minor, acted solely as a scribe for Dr. Hernandez Greene on this date 06/04/2021  .\par  \par All medical record entries made by the Scribe were at my, Dr. Hernandez Greene, direction and personally dictated by me on 06/04/2021 . I have reviewed the chart and agree that the record accurately reflects my personal performance of the history, physical exam, assessment and plan. I have also personally directed, reviewed, and agreed with the chart.

## 2021-06-16 NOTE — CDI QUERY NOTE - NSCDIOTHERTXTBX_GEN_ALL_CORE_HH
Please document the relationship between the following conditions:  A) Left ankle fracture associated with osteoporosis  B) Left ankle fracture not associated with osteoporosis  C) Unable to determine  D) Other ( Please specify)  E) Not clinically significant    CHART DOCUMENTATION:    Discharge summary documentation on 5/25/2021:  #LT ankle fracture 2/2 mechanical fall +osteoporosis  #RT superior pubic ramus fracture

## 2021-06-16 NOTE — CDI QUERY NOTE - NSCDI_DOCCLARIFY2_GEN_ALL_CORE_FT
Documentation clarification is required for accuracy in coding and billing, claim validation and reporting severity of illness, quality data and risk of mortality.
No

## 2021-07-09 ENCOUNTER — APPOINTMENT (OUTPATIENT)
Dept: ORTHOPEDIC SURGERY | Facility: CLINIC | Age: 86
End: 2021-07-09
Payer: MEDICARE

## 2021-07-09 DIAGNOSIS — S82.832D OTHER FRACTURE OF UPPER AND LOWER END OF LEFT FIBULA, SUBSEQUENT ENCOUNTER FOR CLOSED FRACTURE WITH ROUTINE HEALING: ICD-10-CM

## 2021-07-09 PROCEDURE — 73610 X-RAY EXAM OF ANKLE: CPT | Mod: 26,LT

## 2021-07-09 PROCEDURE — 99024 POSTOP FOLLOW-UP VISIT: CPT

## 2021-07-09 NOTE — DISCUSSION/SUMMARY
[de-identified] : At this time the patient can transition out of the boot and into an ASO brace for support for the next month until she regains strength and confidence in her ankle.  I gave her a physical therapy prescription to work on gait training/balance, ankle range of motion and strength.  At this time she has no restrictions in regards to her ankle but I want her to take it slow.  I filled out the paperwork for Andrzej.  I reviewed the x-rays and answered all her questions as well as her daughter's questions in office today.  The patient can follow-up in office on an as-needed basis.

## 2021-07-09 NOTE — HISTORY OF PRESENT ILLNESS
[FreeTextEntry1] : 7/9/21: The patient is here for follow-up of her left ankle fracture.\par \par 6/4/21: INGRIS BARCENAS is a 86 year old female who presents for initial evaluation of left ankle fracture s/p fall. She was evaluated at [facility] ED and had XR images done on 5/22/2021. She presents in a wheelchair.

## 2021-07-09 NOTE — PHYSICAL EXAM
[de-identified] : General: Alert and oriented x3. In no acute distress. Pleasant in nature with a normal affect. No apparent respiratory distress.\par \par Left Ankle\par Skin: Clean, dry, intact\par Inspection: No obvious malalignment, no swelling, no effusion; no lymphadenopathy\par Pulses: 2+ DP/PT pulses\par ROM: 10 degrees of dorsiflexion, 40 degrees of plantarflexion, 10 degrees of subtalar motion\par Tenderness: NO fibula tenderness. No tenderness over the lateral malleolus, no CFL/ATFL/PTFL pain. No medial malleolus pain, no deltoid ligament pain. No proximal fibular pain. No heel pain.\par Stability: Negative anterior/posterior drawer.\par Strength: 5/5 TA/GS/EHL\par Neuro: In tact to light touch throughout\par Additional tests: Negative Leal's test, Negative syndesmosis squeeze test.  [de-identified] : X-rays of the left ankle reviewed, 7/9/2021: Fibula fracture healed.

## 2021-08-07 ENCOUNTER — INPATIENT (INPATIENT)
Facility: HOSPITAL | Age: 86
LOS: 4 days | Discharge: HOME HEALTH | DRG: 74 | End: 2021-08-12
Attending: FAMILY MEDICINE | Admitting: STUDENT IN AN ORGANIZED HEALTH CARE EDUCATION/TRAINING PROGRAM
Payer: MEDICARE

## 2021-08-07 VITALS
HEIGHT: 61 IN | HEART RATE: 104 BPM | OXYGEN SATURATION: 95 % | DIASTOLIC BLOOD PRESSURE: 68 MMHG | TEMPERATURE: 99 F | WEIGHT: 149.91 LBS | SYSTOLIC BLOOD PRESSURE: 115 MMHG | RESPIRATION RATE: 17 BRPM

## 2021-08-07 DIAGNOSIS — M79.606 PAIN IN LEG, UNSPECIFIED: ICD-10-CM

## 2021-08-07 LAB
ALBUMIN SERPL ELPH-MCNC: 3 G/DL — LOW (ref 3.3–5)
ALP SERPL-CCNC: 97 U/L — SIGNIFICANT CHANGE UP (ref 40–120)
ALT FLD-CCNC: 18 U/L — SIGNIFICANT CHANGE UP (ref 12–78)
ANION GAP SERPL CALC-SCNC: 6 MMOL/L — SIGNIFICANT CHANGE UP (ref 5–17)
AST SERPL-CCNC: 13 U/L — LOW (ref 15–37)
BASOPHILS # BLD AUTO: 0.05 K/UL — SIGNIFICANT CHANGE UP (ref 0–0.2)
BASOPHILS NFR BLD AUTO: 0.6 % — SIGNIFICANT CHANGE UP (ref 0–2)
BILIRUB SERPL-MCNC: 0.3 MG/DL — SIGNIFICANT CHANGE UP (ref 0.2–1.2)
BUN SERPL-MCNC: 29 MG/DL — HIGH (ref 7–23)
CALCIUM SERPL-MCNC: 9.4 MG/DL — SIGNIFICANT CHANGE UP (ref 8.5–10.1)
CHLORIDE SERPL-SCNC: 114 MMOL/L — HIGH (ref 96–108)
CO2 SERPL-SCNC: 21 MMOL/L — LOW (ref 22–31)
CREAT SERPL-MCNC: 0.95 MG/DL — SIGNIFICANT CHANGE UP (ref 0.5–1.3)
EOSINOPHIL # BLD AUTO: 0.28 K/UL — SIGNIFICANT CHANGE UP (ref 0–0.5)
EOSINOPHIL NFR BLD AUTO: 3.1 % — SIGNIFICANT CHANGE UP (ref 0–6)
GLUCOSE SERPL-MCNC: 123 MG/DL — HIGH (ref 70–99)
HCT VFR BLD CALC: 30.8 % — LOW (ref 34.5–45)
HGB BLD-MCNC: 9.8 G/DL — LOW (ref 11.5–15.5)
IMM GRANULOCYTES NFR BLD AUTO: 0.8 % — SIGNIFICANT CHANGE UP (ref 0–1.5)
LYMPHOCYTES # BLD AUTO: 0.9 K/UL — LOW (ref 1–3.3)
LYMPHOCYTES # BLD AUTO: 10.1 % — LOW (ref 13–44)
MCHC RBC-ENTMCNC: 27.5 PG — SIGNIFICANT CHANGE UP (ref 27–34)
MCHC RBC-ENTMCNC: 31.8 GM/DL — LOW (ref 32–36)
MCV RBC AUTO: 86.5 FL — SIGNIFICANT CHANGE UP (ref 80–100)
MONOCYTES # BLD AUTO: 0.72 K/UL — SIGNIFICANT CHANGE UP (ref 0–0.9)
MONOCYTES NFR BLD AUTO: 8.1 % — SIGNIFICANT CHANGE UP (ref 2–14)
NEUTROPHILS # BLD AUTO: 6.9 K/UL — SIGNIFICANT CHANGE UP (ref 1.8–7.4)
NEUTROPHILS NFR BLD AUTO: 77.3 % — HIGH (ref 43–77)
PLATELET # BLD AUTO: 192 K/UL — SIGNIFICANT CHANGE UP (ref 150–400)
POTASSIUM SERPL-MCNC: 4.2 MMOL/L — SIGNIFICANT CHANGE UP (ref 3.5–5.3)
POTASSIUM SERPL-SCNC: 4.2 MMOL/L — SIGNIFICANT CHANGE UP (ref 3.5–5.3)
PROT SERPL-MCNC: 6.5 GM/DL — SIGNIFICANT CHANGE UP (ref 6–8.3)
RBC # BLD: 3.56 M/UL — LOW (ref 3.8–5.2)
RBC # FLD: 14.4 % — SIGNIFICANT CHANGE UP (ref 10.3–14.5)
SARS-COV-2 RNA SPEC QL NAA+PROBE: SIGNIFICANT CHANGE UP
SODIUM SERPL-SCNC: 141 MMOL/L — SIGNIFICANT CHANGE UP (ref 135–145)
WBC # BLD: 8.92 K/UL — SIGNIFICANT CHANGE UP (ref 3.8–10.5)
WBC # FLD AUTO: 8.92 K/UL — SIGNIFICANT CHANGE UP (ref 3.8–10.5)

## 2021-08-07 PROCEDURE — 84100 ASSAY OF PHOSPHORUS: CPT

## 2021-08-07 PROCEDURE — 82746 ASSAY OF FOLIC ACID SERUM: CPT

## 2021-08-07 PROCEDURE — 83735 ASSAY OF MAGNESIUM: CPT

## 2021-08-07 PROCEDURE — 93005 ELECTROCARDIOGRAM TRACING: CPT

## 2021-08-07 PROCEDURE — 99222 1ST HOSP IP/OBS MODERATE 55: CPT

## 2021-08-07 PROCEDURE — 82607 VITAMIN B-12: CPT

## 2021-08-07 PROCEDURE — 82962 GLUCOSE BLOOD TEST: CPT

## 2021-08-07 PROCEDURE — 73521 X-RAY EXAM HIPS BI 2 VIEWS: CPT | Mod: 26

## 2021-08-07 PROCEDURE — 97163 PT EVAL HIGH COMPLEX 45 MIN: CPT | Mod: GP

## 2021-08-07 PROCEDURE — 85025 COMPLETE CBC W/AUTO DIFF WBC: CPT

## 2021-08-07 PROCEDURE — 80053 COMPREHEN METABOLIC PANEL: CPT

## 2021-08-07 PROCEDURE — 71045 X-RAY EXAM CHEST 1 VIEW: CPT | Mod: 26

## 2021-08-07 PROCEDURE — U0003: CPT

## 2021-08-07 PROCEDURE — 93010 ELECTROCARDIOGRAM REPORT: CPT

## 2021-08-07 PROCEDURE — 85730 THROMBOPLASTIN TIME PARTIAL: CPT

## 2021-08-07 PROCEDURE — 97116 GAIT TRAINING THERAPY: CPT | Mod: GP

## 2021-08-07 PROCEDURE — U0005: CPT

## 2021-08-07 PROCEDURE — 97110 THERAPEUTIC EXERCISES: CPT | Mod: GP

## 2021-08-07 PROCEDURE — 73552 X-RAY EXAM OF FEMUR 2/>: CPT | Mod: 26,50

## 2021-08-07 PROCEDURE — 97530 THERAPEUTIC ACTIVITIES: CPT | Mod: GP

## 2021-08-07 PROCEDURE — 84443 ASSAY THYROID STIM HORMONE: CPT

## 2021-08-07 PROCEDURE — 86769 SARS-COV-2 COVID-19 ANTIBODY: CPT

## 2021-08-07 PROCEDURE — 73562 X-RAY EXAM OF KNEE 3: CPT | Mod: 26,50

## 2021-08-07 PROCEDURE — 99285 EMERGENCY DEPT VISIT HI MDM: CPT

## 2021-08-07 PROCEDURE — 85610 PROTHROMBIN TIME: CPT

## 2021-08-07 PROCEDURE — 36415 COLL VENOUS BLD VENIPUNCTURE: CPT

## 2021-08-07 PROCEDURE — 73590 X-RAY EXAM OF LOWER LEG: CPT | Mod: 26,50

## 2021-08-07 PROCEDURE — 85027 COMPLETE CBC AUTOMATED: CPT

## 2021-08-07 PROCEDURE — 83036 HEMOGLOBIN GLYCOSYLATED A1C: CPT

## 2021-08-07 RX ORDER — ACETAMINOPHEN 500 MG
975 TABLET ORAL ONCE
Refills: 0 | Status: COMPLETED | OUTPATIENT
Start: 2021-08-07 | End: 2021-08-07

## 2021-08-07 RX ORDER — IBUPROFEN 200 MG
600 TABLET ORAL ONCE
Refills: 0 | Status: COMPLETED | OUTPATIENT
Start: 2021-08-07 | End: 2021-08-07

## 2021-08-07 RX ORDER — GABAPENTIN 400 MG/1
100 CAPSULE ORAL ONCE
Refills: 0 | Status: COMPLETED | OUTPATIENT
Start: 2021-08-07 | End: 2021-08-07

## 2021-08-07 RX ORDER — ACETAMINOPHEN 500 MG
650 TABLET ORAL EVERY 6 HOURS
Refills: 0 | Status: DISCONTINUED | OUTPATIENT
Start: 2021-08-07 | End: 2021-08-12

## 2021-08-07 RX ADMIN — Medication 975 MILLIGRAM(S): at 17:58

## 2021-08-07 RX ADMIN — Medication 600 MILLIGRAM(S): at 17:58

## 2021-08-07 RX ADMIN — GABAPENTIN 100 MILLIGRAM(S): 400 CAPSULE ORAL at 16:42

## 2021-08-07 NOTE — ED ADULT NURSE NOTE - OBJECTIVE STATEMENT
Patient states she fell 5 days ago and has since been having difficulty walking. Patient color good. No visible injury.

## 2021-08-07 NOTE — ED ADULT TRIAGE NOTE - CHIEF COMPLAINT QUOTE
Patient comes in s/p mechanical trip and fall on Tuesday. Patient with c/o bilateral leg pain from knee to feet. Denies any other complaints.

## 2021-08-07 NOTE — ED PROVIDER NOTE - MUSCULOSKELETAL, MLM
Spine appears normal, range of motion is not limited. neck non tender, supple. back  non tender and stable.  pelvis non tender and stable. b/l knee has poor AROM due to pain no obvious diffusion joints stable.  b/l calves non tender and no swelling . b/l an shin no deformities, swelling and non tender. ankles good AROM w/o pain and non tender. feet non tender,  no swelling, DP pulses acceptable.

## 2021-08-07 NOTE — ED PROVIDER NOTE - ENMT, MLM
Airway patent, Nasal mucosa clear. Mouth with mildly dry mucosa. Throat has no vesicles,  uvula is midline. OP clear.

## 2021-08-07 NOTE — ED PROVIDER NOTE - OBJECTIVE STATEMENT
85 y/o female with a PMHx of arthritis , HLD,  HTN, CAD, DM, presents to the ED BIBA from home s/p fall c/o b/l shin pain x3 days. +b/l anterior knee  and lower legs pain s/p controlled fall episode 07/29. States legs were shaking after bathroom use and  health aid noticed  and guided her down.  Since then pt c/o persistent pain and difficulty ambulating. No significant trauma,  head injury.  Family is unable to get pt PCP  because of pt's inability to ambulate to car. Denies calf, hip, thigh pain. 85 y/o female with a PMHx of arthritis , HLD,  HTN, CAD, DM, presents to the ED BIBA from home s/p fall c/o b/l shin pain x3 days. +b/l anterior knee  and lower legs pain s/p controlled fall episode 07/29. States legs were shaking after bathroom use and  health aid noticed  and guided her down.  Since then pt c/o persistent pain and difficulty ambulating. No significant trauma nor head injury.  Family is unable to get pt PCP  because of pt's inability to ambulate to car. Denies calf, hip, thigh pain.

## 2021-08-07 NOTE — ED PROVIDER NOTE - CLINICAL SUMMARY MEDICAL DECISION MAKING FREE TEXT BOX
85 y/o female BIBA from home c/o b/l ankle/shin  pain s/p fall with minimal trauma. Plan : XR ambulation trail 87 y/o female BIBA from home c/o b/l ankle/shin  pain s/p fall with minimal trauma. Plan : XR ambulation trial

## 2021-08-07 NOTE — ED PROVIDER NOTE - PROGRESS NOTE DETAILS
Dionicio Velazquez for attending Dr. Matson: Daughter reports remembers pt  is on Neurontin for b/l lower pain neuropathy. Likely the cause of lower leg pains. Dionicio Washburn for attending Dr. Mathur: Received sign out from my colleague Dr. Matson: pt presents with b/l knee pain in fall 3 days ago, reassessment, unable to ambulate, accept into medicine.

## 2021-08-07 NOTE — ED PROVIDER NOTE - CARE PLAN
Principal Discharge DX:	Pain of lower extremity   Principal Discharge DX:	Pain of lower extremity  Secondary Diagnosis:	Unable to ambulate

## 2021-08-07 NOTE — ED PROVIDER NOTE - CONSTITUTIONAL, MLM
normal... Elderly white female , well appearing, awake, alert, oriented to person, place, time/situation and in no apparent distress.

## 2021-08-07 NOTE — ED PROVIDER NOTE - SKIN, MLM
Skin normal color for race, warm, dry and intact. No evidence of rash. Skin normal color for race, warm, dry and intact. No evidence of rash.  No external evidence of trauma.

## 2021-08-08 LAB
A1C WITH ESTIMATED AVERAGE GLUCOSE RESULT: 7.2 % — HIGH (ref 4–5.6)
ADD ON TEST-SPECIMEN IN LAB: SIGNIFICANT CHANGE UP
ALBUMIN SERPL ELPH-MCNC: 2.9 G/DL — LOW (ref 3.3–5)
ALP SERPL-CCNC: 93 U/L — SIGNIFICANT CHANGE UP (ref 40–120)
ALT FLD-CCNC: 17 U/L — SIGNIFICANT CHANGE UP (ref 12–78)
ANION GAP SERPL CALC-SCNC: 8 MMOL/L — SIGNIFICANT CHANGE UP (ref 5–17)
APTT BLD: 27.9 SEC — SIGNIFICANT CHANGE UP (ref 27.5–35.5)
AST SERPL-CCNC: 14 U/L — LOW (ref 15–37)
BASOPHILS # BLD AUTO: 0.04 K/UL — SIGNIFICANT CHANGE UP (ref 0–0.2)
BASOPHILS NFR BLD AUTO: 0.7 % — SIGNIFICANT CHANGE UP (ref 0–2)
BILIRUB SERPL-MCNC: 0.3 MG/DL — SIGNIFICANT CHANGE UP (ref 0.2–1.2)
BUN SERPL-MCNC: 29 MG/DL — HIGH (ref 7–23)
CALCIUM SERPL-MCNC: 9 MG/DL — SIGNIFICANT CHANGE UP (ref 8.5–10.1)
CHLORIDE SERPL-SCNC: 114 MMOL/L — HIGH (ref 96–108)
CO2 SERPL-SCNC: 20 MMOL/L — LOW (ref 22–31)
COVID-19 SPIKE DOMAIN AB INTERP: POSITIVE
COVID-19 SPIKE DOMAIN ANTIBODY RESULT: 84.1 U/ML — HIGH
CREAT SERPL-MCNC: 0.9 MG/DL — SIGNIFICANT CHANGE UP (ref 0.5–1.3)
EOSINOPHIL # BLD AUTO: 0.18 K/UL — SIGNIFICANT CHANGE UP (ref 0–0.5)
EOSINOPHIL NFR BLD AUTO: 3.3 % — SIGNIFICANT CHANGE UP (ref 0–6)
ESTIMATED AVERAGE GLUCOSE: 160 MG/DL — HIGH (ref 68–114)
GLUCOSE BLDC GLUCOMTR-MCNC: 109 MG/DL — HIGH (ref 70–99)
GLUCOSE SERPL-MCNC: 169 MG/DL — HIGH (ref 70–99)
HCT VFR BLD CALC: 29.3 % — LOW (ref 34.5–45)
HGB BLD-MCNC: 9.4 G/DL — LOW (ref 11.5–15.5)
IMM GRANULOCYTES NFR BLD AUTO: 1.1 % — SIGNIFICANT CHANGE UP (ref 0–1.5)
INR BLD: 1.1 RATIO — SIGNIFICANT CHANGE UP (ref 0.88–1.16)
LYMPHOCYTES # BLD AUTO: 0.76 K/UL — LOW (ref 1–3.3)
LYMPHOCYTES # BLD AUTO: 14.1 % — SIGNIFICANT CHANGE UP (ref 13–44)
MCHC RBC-ENTMCNC: 27.5 PG — SIGNIFICANT CHANGE UP (ref 27–34)
MCHC RBC-ENTMCNC: 32.1 GM/DL — SIGNIFICANT CHANGE UP (ref 32–36)
MCV RBC AUTO: 85.7 FL — SIGNIFICANT CHANGE UP (ref 80–100)
MONOCYTES # BLD AUTO: 0.45 K/UL — SIGNIFICANT CHANGE UP (ref 0–0.9)
MONOCYTES NFR BLD AUTO: 8.3 % — SIGNIFICANT CHANGE UP (ref 2–14)
NEUTROPHILS # BLD AUTO: 3.9 K/UL — SIGNIFICANT CHANGE UP (ref 1.8–7.4)
NEUTROPHILS NFR BLD AUTO: 72.5 % — SIGNIFICANT CHANGE UP (ref 43–77)
PLATELET # BLD AUTO: 173 K/UL — SIGNIFICANT CHANGE UP (ref 150–400)
POTASSIUM SERPL-MCNC: 3.8 MMOL/L — SIGNIFICANT CHANGE UP (ref 3.5–5.3)
POTASSIUM SERPL-SCNC: 3.8 MMOL/L — SIGNIFICANT CHANGE UP (ref 3.5–5.3)
PROT SERPL-MCNC: 6.6 GM/DL — SIGNIFICANT CHANGE UP (ref 6–8.3)
PROTHROM AB SERPL-ACNC: 12.8 SEC — SIGNIFICANT CHANGE UP (ref 10.6–13.6)
RBC # BLD: 3.42 M/UL — LOW (ref 3.8–5.2)
RBC # FLD: 14.5 % — SIGNIFICANT CHANGE UP (ref 10.3–14.5)
SARS-COV-2 IGG+IGM SERPL QL IA: 84.1 U/ML — HIGH
SARS-COV-2 IGG+IGM SERPL QL IA: POSITIVE
SODIUM SERPL-SCNC: 142 MMOL/L — SIGNIFICANT CHANGE UP (ref 135–145)
TSH SERPL-MCNC: 1.52 UU/ML — SIGNIFICANT CHANGE UP (ref 0.34–4.82)
VIT B12 SERPL-MCNC: 163 PG/ML — LOW (ref 232–1245)
WBC # BLD: 5.39 K/UL — SIGNIFICANT CHANGE UP (ref 3.8–10.5)
WBC # FLD AUTO: 5.39 K/UL — SIGNIFICANT CHANGE UP (ref 3.8–10.5)

## 2021-08-08 PROCEDURE — 99232 SBSQ HOSP IP/OBS MODERATE 35: CPT

## 2021-08-08 RX ORDER — GABAPENTIN 400 MG/1
200 CAPSULE ORAL THREE TIMES A DAY
Refills: 0 | Status: DISCONTINUED | OUTPATIENT
Start: 2021-08-08 | End: 2021-08-12

## 2021-08-08 RX ORDER — DEXTROSE 50 % IN WATER 50 %
25 SYRINGE (ML) INTRAVENOUS ONCE
Refills: 0 | Status: DISCONTINUED | OUTPATIENT
Start: 2021-08-08 | End: 2021-08-12

## 2021-08-08 RX ORDER — FOLIC ACID 0.8 MG
1 TABLET ORAL DAILY
Refills: 0 | Status: DISCONTINUED | OUTPATIENT
Start: 2021-08-08 | End: 2021-08-12

## 2021-08-08 RX ORDER — PANTOPRAZOLE SODIUM 20 MG/1
40 TABLET, DELAYED RELEASE ORAL
Refills: 0 | Status: DISCONTINUED | OUTPATIENT
Start: 2021-08-08 | End: 2021-08-12

## 2021-08-08 RX ORDER — OMEGA-3 ACID ETHYL ESTERS 1 G
2 CAPSULE ORAL DAILY
Refills: 0 | Status: DISCONTINUED | OUTPATIENT
Start: 2021-08-08 | End: 2021-08-12

## 2021-08-08 RX ORDER — MECLIZINE HCL 12.5 MG
12.5 TABLET ORAL DAILY
Refills: 0 | Status: DISCONTINUED | OUTPATIENT
Start: 2021-08-08 | End: 2021-08-12

## 2021-08-08 RX ORDER — SODIUM CHLORIDE 9 MG/ML
1000 INJECTION, SOLUTION INTRAVENOUS
Refills: 0 | Status: DISCONTINUED | OUTPATIENT
Start: 2021-08-08 | End: 2021-08-12

## 2021-08-08 RX ORDER — ATORVASTATIN CALCIUM 80 MG/1
80 TABLET, FILM COATED ORAL AT BEDTIME
Refills: 0 | Status: DISCONTINUED | OUTPATIENT
Start: 2021-08-08 | End: 2021-08-12

## 2021-08-08 RX ORDER — GLUCAGON INJECTION, SOLUTION 0.5 MG/.1ML
1 INJECTION, SOLUTION SUBCUTANEOUS ONCE
Refills: 0 | Status: DISCONTINUED | OUTPATIENT
Start: 2021-08-08 | End: 2021-08-12

## 2021-08-08 RX ORDER — FUROSEMIDE 40 MG
40 TABLET ORAL
Refills: 0 | Status: DISCONTINUED | OUTPATIENT
Start: 2021-08-08 | End: 2021-08-12

## 2021-08-08 RX ORDER — INSULIN LISPRO 100/ML
VIAL (ML) SUBCUTANEOUS AT BEDTIME
Refills: 0 | Status: DISCONTINUED | OUTPATIENT
Start: 2021-08-08 | End: 2021-08-12

## 2021-08-08 RX ORDER — LOSARTAN POTASSIUM 100 MG/1
50 TABLET, FILM COATED ORAL DAILY
Refills: 0 | Status: DISCONTINUED | OUTPATIENT
Start: 2021-08-08 | End: 2021-08-12

## 2021-08-08 RX ORDER — INSULIN LISPRO 100/ML
VIAL (ML) SUBCUTANEOUS
Refills: 0 | Status: DISCONTINUED | OUTPATIENT
Start: 2021-08-08 | End: 2021-08-12

## 2021-08-08 RX ORDER — SITAGLIPTIN 50 MG/1
1 TABLET, FILM COATED ORAL
Qty: 0 | Refills: 0 | DISCHARGE

## 2021-08-08 RX ORDER — MELOXICAM 15 MG/1
1 TABLET ORAL
Qty: 0 | Refills: 0 | DISCHARGE

## 2021-08-08 RX ORDER — ENOXAPARIN SODIUM 100 MG/ML
40 INJECTION SUBCUTANEOUS DAILY
Refills: 0 | Status: DISCONTINUED | OUTPATIENT
Start: 2021-08-08 | End: 2021-08-12

## 2021-08-08 RX ORDER — DEXTROSE 50 % IN WATER 50 %
15 SYRINGE (ML) INTRAVENOUS ONCE
Refills: 0 | Status: DISCONTINUED | OUTPATIENT
Start: 2021-08-08 | End: 2021-08-12

## 2021-08-08 RX ORDER — DEXTROSE 50 % IN WATER 50 %
12.5 SYRINGE (ML) INTRAVENOUS ONCE
Refills: 0 | Status: DISCONTINUED | OUTPATIENT
Start: 2021-08-08 | End: 2021-08-12

## 2021-08-08 RX ORDER — PREGABALIN 225 MG/1
1000 CAPSULE ORAL DAILY
Refills: 0 | Status: COMPLETED | OUTPATIENT
Start: 2021-08-08 | End: 2021-08-11

## 2021-08-08 RX ORDER — ASPIRIN/CALCIUM CARB/MAGNESIUM 324 MG
81 TABLET ORAL DAILY
Refills: 0 | Status: DISCONTINUED | OUTPATIENT
Start: 2021-08-08 | End: 2021-08-12

## 2021-08-08 RX ORDER — GABAPENTIN 400 MG/1
100 CAPSULE ORAL
Refills: 0 | Status: DISCONTINUED | OUTPATIENT
Start: 2021-08-08 | End: 2021-08-08

## 2021-08-08 RX ADMIN — PANTOPRAZOLE SODIUM 40 MILLIGRAM(S): 20 TABLET, DELAYED RELEASE ORAL at 09:16

## 2021-08-08 RX ADMIN — GABAPENTIN 100 MILLIGRAM(S): 400 CAPSULE ORAL at 09:16

## 2021-08-08 RX ADMIN — Medication 1: at 18:46

## 2021-08-08 RX ADMIN — Medication 2 GRAM(S): at 09:17

## 2021-08-08 RX ADMIN — ENOXAPARIN SODIUM 40 MILLIGRAM(S): 100 INJECTION SUBCUTANEOUS at 09:16

## 2021-08-08 RX ADMIN — LOSARTAN POTASSIUM 50 MILLIGRAM(S): 100 TABLET, FILM COATED ORAL at 09:16

## 2021-08-08 RX ADMIN — GABAPENTIN 200 MILLIGRAM(S): 400 CAPSULE ORAL at 15:54

## 2021-08-08 RX ADMIN — GABAPENTIN 200 MILLIGRAM(S): 400 CAPSULE ORAL at 22:45

## 2021-08-08 RX ADMIN — Medication 1 MILLIGRAM(S): at 18:46

## 2021-08-08 RX ADMIN — Medication 81 MILLIGRAM(S): at 09:16

## 2021-08-08 RX ADMIN — PREGABALIN 1000 MICROGRAM(S): 225 CAPSULE ORAL at 22:44

## 2021-08-08 RX ADMIN — ATORVASTATIN CALCIUM 80 MILLIGRAM(S): 80 TABLET, FILM COATED ORAL at 22:45

## 2021-08-08 NOTE — H&P ADULT - NEUROLOGICAL DETAILS
Able to lift up B/L LEs against gravity and also with resistance applied/responds to pain/responds to verbal commands/sensation intact/cranial nerves intact

## 2021-08-08 NOTE — H&P ADULT - HISTORY OF PRESENT ILLNESS
87 y/o F w/ PMH of arthritis, HTN, dyslipidemia, CAD s/p PCI, DM2, peripheral neuropathy, osteoporosis, breast cancer s/p R mastectomy, last admission to  5/2021 for fall and L ankle fracture / R suprerior pubic ramus fracture, p/w mechanical fall and difficulty ambulating. Patient was woken up from sleep. Initially states she fell 1 year ago and then states she fell 2 weeks ago. As per paperwork from EMS, daughter had reported that patient fell 2 days ago, but was helped by aid to the ground. Patient being admitted due to decreased ability to ambulate. Patient states she lives alone at home with 24 hour aide. C/o B/L knee pain. Denies any other complaints. Denies CP, SOB, cough, runny nose, sore throat, nausea, vomiting, abdominal pain, fever, chills         PSH:  R mastectomy     Social Hx: Denies x 3    Family Hx: Father-lung ca; Mother-uterine ca

## 2021-08-08 NOTE — H&P ADULT - ASSESSMENT
85 y/o F w/ PMH of arthritis, HTN, dyslipidemia, CAD s/p PCI, DM2, peripheral neuropathy, osteoporosis, breast cancer s/p R mastectomy, last admission to  5/2021 for fall and L ankle fracture / R suprerior pubic ramus fracture, p/w mechanical fall and difficulty ambulating    *Inability to ambulate 2/2 deconditioning   -PT consult  -Fall precautions  -Vitamin B12 / TSH     *DM2  -Humalog ISS  -Diabetic diet   -Oral meds held temporarily during hospitalization    *Microcytic anemia  -Trend H/H and if stable -> f/u outpatient for further management     *Low bicarb  -Recheck in AM    *HTN / Dyslipidemia / CAD / peripheral neuropathy / osteoporosis / breast cancer  -C/w home meds and f/u outpatient for further management if conditions remain stable during hospitalization     *Med Reconcilliation  -Patient doesn't recall meds. Reconciled meds using EMS document and [employer]. Will need to confirm in AM     *DVT ppx   -Lovenox  87 y/o F w/ PMH of arthritis, HTN, dyslipidemia, CAD s/p PCI, DM2, peripheral neuropathy, osteoporosis, breast cancer s/p R mastectomy, last admission to  5/2021 for fall and L ankle fracture / R suprerior pubic ramus fracture, p/w mechanical fall and difficulty ambulating    *Inability to ambulate 2/2 deconditioning   -PT consult  -Fall precautions  -Vitamin B12 / TSH     *DM2  -Humalog ISS  -Diabetic diet   -Oral meds held temporarily during hospitalization    *Microcytic anemia  -Trend H/H and if stable -> f/u outpatient for further management     *Low bicarb  -Recheck in AM    *HTN / Dyslipidemia / CAD / peripheral neuropathy / osteoporosis / breast cancer  -C/w home meds and f/u outpatient for further management if conditions remain stable during hospitalization     *Med Reconciliation  -Patient doesn't recall meds. Reconciled meds using EMS document and [employer]. Will need to confirm in AM     *DVT ppx   -Lovenox

## 2021-08-08 NOTE — PATIENT PROFILE ADULT - LIVING ENVIRONMENT
Quality 110: Preventive Care And Screening: Influenza Immunization: Influenza Immunization Administered during Influenza season Detail Level: Detailed no

## 2021-08-09 LAB — FOLATE SERPL-MCNC: 15.8 NG/ML — SIGNIFICANT CHANGE UP

## 2021-08-09 PROCEDURE — 99233 SBSQ HOSP IP/OBS HIGH 50: CPT

## 2021-08-09 RX ADMIN — Medication 1 MILLIGRAM(S): at 10:40

## 2021-08-09 RX ADMIN — Medication 81 MILLIGRAM(S): at 10:40

## 2021-08-09 RX ADMIN — ATORVASTATIN CALCIUM 80 MILLIGRAM(S): 80 TABLET, FILM COATED ORAL at 21:11

## 2021-08-09 RX ADMIN — GABAPENTIN 200 MILLIGRAM(S): 400 CAPSULE ORAL at 13:38

## 2021-08-09 RX ADMIN — Medication 1: at 12:51

## 2021-08-09 RX ADMIN — PREGABALIN 1000 MICROGRAM(S): 225 CAPSULE ORAL at 13:38

## 2021-08-09 RX ADMIN — GABAPENTIN 200 MILLIGRAM(S): 400 CAPSULE ORAL at 21:11

## 2021-08-09 RX ADMIN — LOSARTAN POTASSIUM 50 MILLIGRAM(S): 100 TABLET, FILM COATED ORAL at 10:40

## 2021-08-09 RX ADMIN — ENOXAPARIN SODIUM 40 MILLIGRAM(S): 100 INJECTION SUBCUTANEOUS at 10:41

## 2021-08-09 RX ADMIN — Medication 2 GRAM(S): at 10:40

## 2021-08-09 RX ADMIN — PANTOPRAZOLE SODIUM 40 MILLIGRAM(S): 20 TABLET, DELAYED RELEASE ORAL at 06:37

## 2021-08-09 RX ADMIN — GABAPENTIN 200 MILLIGRAM(S): 400 CAPSULE ORAL at 06:37

## 2021-08-09 NOTE — PHYSICAL THERAPY INITIAL EVALUATION ADULT - RANGE OF MOTION EXAMINATION, REHAB EVAL
B shldr flexion ~80*/bilateral upper extremity ROM was WFL (within functional limits)/bilateral lower extremity ROM was WFL (within functional limits)/deficits as listed below

## 2021-08-09 NOTE — PHYSICAL THERAPY INITIAL EVALUATION ADULT - GENERAL OBSERVATIONS, REHAB EVAL
pt received in bed on 2S. pt pleasant and cooperative, states "I can't walk" however agreeable to PT. post session pt left sitting in bedside chair, call bell in reach, chair alarm on, oriented to call bell system.

## 2021-08-09 NOTE — PHYSICAL THERAPY INITIAL EVALUATION ADULT - DISCHARGE DISPOSITION, PT EVAL
Recommend continued skilled PT and LOIDA when medically stable for discharge./rehabilitation facility

## 2021-08-10 LAB — SARS-COV-2 RNA SPEC QL NAA+PROBE: SIGNIFICANT CHANGE UP

## 2021-08-10 PROCEDURE — 99232 SBSQ HOSP IP/OBS MODERATE 35: CPT

## 2021-08-10 RX ADMIN — GABAPENTIN 200 MILLIGRAM(S): 400 CAPSULE ORAL at 22:42

## 2021-08-10 RX ADMIN — Medication 2 GRAM(S): at 09:35

## 2021-08-10 RX ADMIN — ATORVASTATIN CALCIUM 80 MILLIGRAM(S): 80 TABLET, FILM COATED ORAL at 22:42

## 2021-08-10 RX ADMIN — PREGABALIN 1000 MICROGRAM(S): 225 CAPSULE ORAL at 09:42

## 2021-08-10 RX ADMIN — Medication 1: at 09:29

## 2021-08-10 RX ADMIN — Medication 1 MILLIGRAM(S): at 09:38

## 2021-08-10 RX ADMIN — GABAPENTIN 200 MILLIGRAM(S): 400 CAPSULE ORAL at 06:22

## 2021-08-10 RX ADMIN — GABAPENTIN 200 MILLIGRAM(S): 400 CAPSULE ORAL at 14:26

## 2021-08-10 RX ADMIN — Medication 81 MILLIGRAM(S): at 09:38

## 2021-08-10 RX ADMIN — ENOXAPARIN SODIUM 40 MILLIGRAM(S): 100 INJECTION SUBCUTANEOUS at 09:38

## 2021-08-10 RX ADMIN — Medication 650 MILLIGRAM(S): at 19:57

## 2021-08-10 RX ADMIN — Medication 650 MILLIGRAM(S): at 20:59

## 2021-08-10 RX ADMIN — Medication 1: at 12:38

## 2021-08-10 RX ADMIN — PANTOPRAZOLE SODIUM 40 MILLIGRAM(S): 20 TABLET, DELAYED RELEASE ORAL at 06:22

## 2021-08-10 RX ADMIN — LOSARTAN POTASSIUM 50 MILLIGRAM(S): 100 TABLET, FILM COATED ORAL at 09:38

## 2021-08-11 ENCOUNTER — TRANSCRIPTION ENCOUNTER (OUTPATIENT)
Age: 86
End: 2021-08-11

## 2021-08-11 LAB
ALBUMIN SERPL ELPH-MCNC: 3.2 G/DL — LOW (ref 3.3–5)
ALP SERPL-CCNC: 103 U/L — SIGNIFICANT CHANGE UP (ref 40–120)
ALT FLD-CCNC: 20 U/L — SIGNIFICANT CHANGE UP (ref 12–78)
ANION GAP SERPL CALC-SCNC: 8 MMOL/L — SIGNIFICANT CHANGE UP (ref 5–17)
AST SERPL-CCNC: 16 U/L — SIGNIFICANT CHANGE UP (ref 15–37)
BILIRUB SERPL-MCNC: 0.2 MG/DL — SIGNIFICANT CHANGE UP (ref 0.2–1.2)
BUN SERPL-MCNC: 36 MG/DL — HIGH (ref 7–23)
CALCIUM SERPL-MCNC: 8.9 MG/DL — SIGNIFICANT CHANGE UP (ref 8.5–10.1)
CHLORIDE SERPL-SCNC: 112 MMOL/L — HIGH (ref 96–108)
CO2 SERPL-SCNC: 20 MMOL/L — LOW (ref 22–31)
CREAT SERPL-MCNC: 0.97 MG/DL — SIGNIFICANT CHANGE UP (ref 0.5–1.3)
GLUCOSE SERPL-MCNC: 209 MG/DL — HIGH (ref 70–99)
HCT VFR BLD CALC: 25.5 % — LOW (ref 34.5–45)
HCT VFR BLD CALC: 32.9 % — LOW (ref 34.5–45)
HGB BLD-MCNC: 10.6 G/DL — LOW (ref 11.5–15.5)
HGB BLD-MCNC: 8.2 G/DL — LOW (ref 11.5–15.5)
MAGNESIUM SERPL-MCNC: 1.7 MG/DL — SIGNIFICANT CHANGE UP (ref 1.6–2.6)
MCHC RBC-ENTMCNC: 27.7 PG — SIGNIFICANT CHANGE UP (ref 27–34)
MCHC RBC-ENTMCNC: 28 PG — SIGNIFICANT CHANGE UP (ref 27–34)
MCHC RBC-ENTMCNC: 32.2 GM/DL — SIGNIFICANT CHANGE UP (ref 32–36)
MCHC RBC-ENTMCNC: 32.2 GM/DL — SIGNIFICANT CHANGE UP (ref 32–36)
MCV RBC AUTO: 86.1 FL — SIGNIFICANT CHANGE UP (ref 80–100)
MCV RBC AUTO: 86.8 FL — SIGNIFICANT CHANGE UP (ref 80–100)
PHOSPHATE SERPL-MCNC: 3.5 MG/DL — SIGNIFICANT CHANGE UP (ref 2.5–4.5)
PLATELET # BLD AUTO: 191 K/UL — SIGNIFICANT CHANGE UP (ref 150–400)
PLATELET # BLD AUTO: 260 K/UL — SIGNIFICANT CHANGE UP (ref 150–400)
POTASSIUM SERPL-MCNC: 4 MMOL/L — SIGNIFICANT CHANGE UP (ref 3.5–5.3)
POTASSIUM SERPL-SCNC: 4 MMOL/L — SIGNIFICANT CHANGE UP (ref 3.5–5.3)
PROT SERPL-MCNC: 7 GM/DL — SIGNIFICANT CHANGE UP (ref 6–8.3)
RBC # BLD: 2.96 M/UL — LOW (ref 3.8–5.2)
RBC # BLD: 3.79 M/UL — LOW (ref 3.8–5.2)
RBC # FLD: 14.5 % — SIGNIFICANT CHANGE UP (ref 10.3–14.5)
RBC # FLD: 14.6 % — HIGH (ref 10.3–14.5)
SODIUM SERPL-SCNC: 140 MMOL/L — SIGNIFICANT CHANGE UP (ref 135–145)
WBC # BLD: 7.68 K/UL — SIGNIFICANT CHANGE UP (ref 3.8–10.5)
WBC # BLD: 8.04 K/UL — SIGNIFICANT CHANGE UP (ref 3.8–10.5)
WBC # FLD AUTO: 7.68 K/UL — SIGNIFICANT CHANGE UP (ref 3.8–10.5)
WBC # FLD AUTO: 8.04 K/UL — SIGNIFICANT CHANGE UP (ref 3.8–10.5)

## 2021-08-11 PROCEDURE — 99232 SBSQ HOSP IP/OBS MODERATE 35: CPT

## 2021-08-11 RX ORDER — ROSUVASTATIN CALCIUM 5 MG/1
1 TABLET ORAL
Qty: 0 | Refills: 0 | DISCHARGE

## 2021-08-11 RX ORDER — MECLIZINE HCL 12.5 MG
1 TABLET ORAL
Qty: 0 | Refills: 0 | DISCHARGE

## 2021-08-11 RX ORDER — GABAPENTIN 400 MG/1
2 CAPSULE ORAL
Qty: 0 | Refills: 0 | DISCHARGE
Start: 2021-08-11

## 2021-08-11 RX ORDER — SERTRALINE 25 MG/1
1 TABLET, FILM COATED ORAL
Qty: 0 | Refills: 0 | DISCHARGE

## 2021-08-11 RX ORDER — SERTRALINE 25 MG/1
25 TABLET, FILM COATED ORAL DAILY
Refills: 0 | Status: DISCONTINUED | OUTPATIENT
Start: 2021-08-11 | End: 2021-08-12

## 2021-08-11 RX ORDER — SITAGLIPTIN 50 MG/1
1 TABLET, FILM COATED ORAL
Qty: 0 | Refills: 0 | DISCHARGE

## 2021-08-11 RX ORDER — OMEGA-3 ACID ETHYL ESTERS 1 G
1 CAPSULE ORAL
Qty: 0 | Refills: 0 | DISCHARGE

## 2021-08-11 RX ORDER — METFORMIN HYDROCHLORIDE 850 MG/1
0 TABLET ORAL
Qty: 0 | Refills: 0 | DISCHARGE

## 2021-08-11 RX ORDER — SODIUM CHLORIDE 9 MG/ML
500 INJECTION INTRAMUSCULAR; INTRAVENOUS; SUBCUTANEOUS ONCE
Refills: 0 | Status: COMPLETED | OUTPATIENT
Start: 2021-08-11 | End: 2021-08-11

## 2021-08-11 RX ORDER — FUROSEMIDE 40 MG
1 TABLET ORAL
Qty: 0 | Refills: 0 | DISCHARGE

## 2021-08-11 RX ORDER — GABAPENTIN 400 MG/1
0 CAPSULE ORAL
Qty: 0 | Refills: 0 | DISCHARGE

## 2021-08-11 RX ORDER — ASPIRIN/CALCIUM CARB/MAGNESIUM 324 MG
1 TABLET ORAL
Qty: 0 | Refills: 0 | DISCHARGE

## 2021-08-11 RX ORDER — FOLIC ACID 0.8 MG
1 TABLET ORAL
Qty: 0 | Refills: 0 | DISCHARGE
Start: 2021-08-11

## 2021-08-11 RX ADMIN — PANTOPRAZOLE SODIUM 40 MILLIGRAM(S): 20 TABLET, DELAYED RELEASE ORAL at 06:30

## 2021-08-11 RX ADMIN — Medication 650 MILLIGRAM(S): at 22:57

## 2021-08-11 RX ADMIN — GABAPENTIN 200 MILLIGRAM(S): 400 CAPSULE ORAL at 16:07

## 2021-08-11 RX ADMIN — GABAPENTIN 200 MILLIGRAM(S): 400 CAPSULE ORAL at 06:30

## 2021-08-11 RX ADMIN — Medication 1: at 12:50

## 2021-08-11 RX ADMIN — ATORVASTATIN CALCIUM 80 MILLIGRAM(S): 80 TABLET, FILM COATED ORAL at 21:20

## 2021-08-11 RX ADMIN — SODIUM CHLORIDE 1000 MILLILITER(S): 9 INJECTION INTRAMUSCULAR; INTRAVENOUS; SUBCUTANEOUS at 05:17

## 2021-08-11 RX ADMIN — Medication 81 MILLIGRAM(S): at 10:46

## 2021-08-11 RX ADMIN — Medication 1: at 21:20

## 2021-08-11 RX ADMIN — SERTRALINE 25 MILLIGRAM(S): 25 TABLET, FILM COATED ORAL at 18:20

## 2021-08-11 RX ADMIN — Medication 1 MILLIGRAM(S): at 10:47

## 2021-08-11 RX ADMIN — Medication 2 GRAM(S): at 10:46

## 2021-08-11 RX ADMIN — Medication 40 MILLIGRAM(S): at 10:47

## 2021-08-11 RX ADMIN — ENOXAPARIN SODIUM 40 MILLIGRAM(S): 100 INJECTION SUBCUTANEOUS at 10:46

## 2021-08-11 RX ADMIN — PREGABALIN 1000 MICROGRAM(S): 225 CAPSULE ORAL at 10:46

## 2021-08-11 RX ADMIN — GABAPENTIN 200 MILLIGRAM(S): 400 CAPSULE ORAL at 21:20

## 2021-08-11 NOTE — DISCHARGE NOTE PROVIDER - NSDCCPCAREPLAN_GEN_ALL_CORE_FT
PRINCIPAL DISCHARGE DIAGNOSIS  Diagnosis: Pain of lower extremity  Assessment and Plan of Treatment: *Inability to ambulate secondary to Vitamin B12 deficiency and Neuropathy  -xrays of lE negative for fractures   -S/P Vitamin B12 Injections  x 3 days , then switch to PO vitamin B12; FU Vitamin B12 levels and FU with neurology as outpatient.   -increased her gabapentin as well; this is really helping her b/l lower extremity neuropathic pains.         SECONDARY DISCHARGE DIAGNOSES  Diagnosis: Unable to ambulate  Assessment and Plan of Treatment:      PRINCIPAL DISCHARGE DIAGNOSIS  Diagnosis: Pain of lower extremity  Assessment and Plan of Treatment: *Inability to ambulate secondary to Vitamin B12 deficiency and Neuropathy  -xrays of LE negative for fractures   -S/P Vitamin B12 Injections  x 3 days , then switch to PO vitamin B12; FU Vitamin B12 levels as outpatient  and FU with neurology as outpatient.   -increased her gabapentin as well; this is really helping her b/l lower extremity neuropathic pains.         SECONDARY DISCHARGE DIAGNOSES  Diagnosis: Unable to ambulate  Assessment and Plan of Treatment:

## 2021-08-11 NOTE — DISCHARGE NOTE NURSING/CASE MANAGEMENT/SOCIAL WORK - PATIENT PORTAL LINK FT
You can access the FollowMyHealth Patient Portal offered by [facility] by registering at the following website: [email or web address]. By joining [employer]’s FollowMyHealth portal, you will also be able to view your health information using other applications (apps) compatible with our system.

## 2021-08-11 NOTE — DISCHARGE NOTE NURSING/CASE MANAGEMENT/SOCIAL WORK - NSDCVIVACCINE_GEN_ALL_CORE_FT
COVID-19, mRNA, LNP-S, PF, 100 mcg/ 0.5 mL dose (Moderna); 25-May-2021 10:21; Adilene Trejo (RN); [employer] [id number] (Exp. Date: 20-Aug-2021); IntraMuscular; Deltoid Right.; 0.5 milliLiter(s);

## 2021-08-11 NOTE — DISCHARGE NOTE PROVIDER - CARE PROVIDERS DIRECT ADDRESSES
[email or web address]

## 2021-08-11 NOTE — DISCHARGE NOTE PROVIDER - NSDCMRMEDTOKEN_GEN_ALL_CORE_FT
aspirin 81 mg oral tablet, chewable: 1 tab(s) orally once a day  Crestor 20 mg oral tablet: 1 tab(s) orally once a day (at bedtime)  Fish Oil 1200 mg oral capsule: 1 cap(s) orally once a day  folic acid 1 mg oral tablet: 1 tab(s) orally once a day  furosemide 40 mg oral tablet: 1 tab(s) orally once a week  ****Tuesday****  gabapentin 100 mg oral capsule: 2 cap(s) orally 3 times a day  Januvia 50 mg oral tablet: 1 tab(s) orally once a day  losartan 100 mg oral tablet: 0.5 tab(s) orally once a day  meclizine 12.5 mg oral tablet: 1 tab(s) orally once a day, As Needed  pantoprazole 40 mg oral delayed release tablet: 1 tab(s) orally once a day (at bedtime)  sertraline 25 mg oral tablet: 1 tab(s) orally once a day  Vitamin B12 1000 mcg oral tablet: 1 tab(s) orally once a day   aspirin 81 mg oral tablet, chewable: 1 tab(s) orally once a day  Crestor 20 mg oral tablet: 1 tab(s) orally once a day (at bedtime)  Fish Oil 1200 mg oral capsule: 1 cap(s) orally once a day  folic acid 1 mg oral tablet: 1 tab(s) orally once a day  furosemide 40 mg oral tablet: 1 tab(s) orally once a week  ****Tuesday****  gabapentin 100 mg oral capsule: 2 cap(s) orally 3 times a day  Januvia 50 mg oral tablet: 1 tab(s) orally once a day  losartan 100 mg oral tablet: 0.5 tab(s) orally once a day  meclizine 12.5 mg oral tablet: 1 tab(s) orally once a day, As Needed  metFORMIN 500 mg oral tablet, extended release: 1 tab(s) orally 4 times a day  pantoprazole 40 mg oral delayed release tablet: 1 tab(s) orally once a day (at bedtime)  sertraline 25 mg oral tablet: 1 tab(s) orally once a day  Vitamin B12 1000 mcg oral tablet: 1 tab(s) orally once a day

## 2021-08-11 NOTE — DISCHARGE NOTE PROVIDER - HOSPITAL COURSE
HOSPITALIST PROGRESS NOTE:  SUBJECTIVE:  PCP:  Chief Complaint: Patient is a 86y old  Female who presents with a chief complaint of mechanical fall and difficulty ambulating (09 Aug 2021 16:40)      HPI:  87 y/o F w/ PMH of arthritis, HTN, dyslipidemia, CAD s/p PCI, DM2, peripheral neuropathy, osteoporosis, breast cancer s/p R mastectomy, last admission to  5/2021 for fall and L ankle fracture / R suprerior pubic ramus fracture, p/w mechanical fall and difficulty ambulating. Patient was woken up from sleep. Initially states she fell 1 year ago and then states she fell 2 weeks ago. As per paperwork from EMS, daughter had reported that patient fell 2 days ago, but was helped by aid to the ground. Patient being admitted due to decreased ability to ambulate. Patient states she lives alone at home with 24 hour aide. C/o B/L knee pain. Denies any other complaints. Denies CP, SOB, cough, runny nose, sore throat, nausea, vomiting, abdominal pain, fever, chills   PSH:  R mastectomy     Social Hx: Denies x 3    Family Hx: Father-lung ca; Mother-uterine ca   (08 Aug 2021 01:52)      8/10: patient now open to rehab; still having difficulty walking very unsteady   8/11:  Patients neuropathy better; Awaiting rehab      87 y/o F w/ PMH of arthritis, HTN, dyslipidemia, CAD s/p PCI, DM2, peripheral neuropathy, osteoporosis, breast cancer s/p R mastectomy, last admission to  5/2021 for fall and L ankle fracture / R suprerior pubic ramus fracture, p/w mechanical fall and difficulty ambulating    *Inability to ambulate secondary to Vitamin B12 deficiency and Neuropathy  -xrays of lE negative for fractures   -PT consult  -Fall precautions  -started Vitamin B12 Injections  x 3 days , then can change to PO vitamin B12; Will likely need to be re-evaluated by PT after completion of vitamin b12. Daughter wants to take her home. If there is improvment in ambulation on day 3-4, can likley go home with PT, thats what family prefers.   -increased her gabapentin as well; this is really helping her b/l lower extremity neuropathic pains.   -patient now agreeing to rehab    *DM2  -Humalog ISS  -Diabetic diet   -Oral meds held temporarily during hospitalization    *Microcytic anemia  -Trend H/H and if stable -> f/u outpatient for further management     *Low bicarb  -Recheck in AM    *HTN / Dyslipidemia / CAD / peripheral neuropathy / osteoporosis / breast cancer  -C/w home meds and f/u outpatient for further management if conditions remain stable during hospitalization       *DVT ppx   -Lovenox       off service note:  here for difficulty ambulating. Vitamin B12 low.   -started Vitamin B12 Injections  x 3 days , then can change to PO vitamin B12; Will likely need to be re-evaluated by PT after completion of vitamin b12. Daughter wants to take her home. If there is improvement in ambulation on day 3-4, can likley go home with PT, thats what family prefers.   -increased her gabapentin as well; this is really helping her b/l lower extrmity neuropathic pains.    total time 80 minutes

## 2021-08-11 NOTE — DISCHARGE NOTE PROVIDER - CARE PROVIDER_API CALL
Rebeca Gillespie)  Neurology  [address], [address]  [place] [address]  Phone: [phone]  Fax: [phone]  Follow Up Time:     Jamal Carpio  [facility]  [address] [place] [address]  Phone: [phone]  Fax: [phone]  Follow Up Time:

## 2021-08-12 VITALS — SYSTOLIC BLOOD PRESSURE: 118 MMHG | DIASTOLIC BLOOD PRESSURE: 50 MMHG | HEART RATE: 84 BPM

## 2021-08-12 PROCEDURE — 99239 HOSP IP/OBS DSCHRG MGMT >30: CPT

## 2021-08-12 RX ORDER — PREGABALIN 225 MG/1
1 CAPSULE ORAL
Qty: 0 | Refills: 0 | DISCHARGE

## 2021-08-12 RX ORDER — FOLIC ACID 0.8 MG
1 TABLET ORAL
Qty: 30 | Refills: 0
Start: 2021-08-12 | End: 2021-09-10

## 2021-08-12 RX ORDER — GABAPENTIN 400 MG/1
2 CAPSULE ORAL
Qty: 180 | Refills: 0
Start: 2021-08-12 | End: 2021-09-10

## 2021-08-12 RX ORDER — PREGABALIN 225 MG/1
1 CAPSULE ORAL
Qty: 30 | Refills: 0
Start: 2021-08-12 | End: 2021-09-10

## 2021-08-12 RX ADMIN — GABAPENTIN 200 MILLIGRAM(S): 400 CAPSULE ORAL at 05:36

## 2021-08-12 RX ADMIN — Medication 2 GRAM(S): at 10:18

## 2021-08-12 RX ADMIN — Medication 1 MILLIGRAM(S): at 10:18

## 2021-08-12 RX ADMIN — Medication 81 MILLIGRAM(S): at 10:17

## 2021-08-12 RX ADMIN — GABAPENTIN 200 MILLIGRAM(S): 400 CAPSULE ORAL at 13:19

## 2021-08-12 RX ADMIN — Medication 2: at 12:00

## 2021-08-12 RX ADMIN — LOSARTAN POTASSIUM 50 MILLIGRAM(S): 100 TABLET, FILM COATED ORAL at 10:18

## 2021-08-12 RX ADMIN — PANTOPRAZOLE SODIUM 40 MILLIGRAM(S): 20 TABLET, DELAYED RELEASE ORAL at 05:36

## 2021-08-12 RX ADMIN — ENOXAPARIN SODIUM 40 MILLIGRAM(S): 100 INJECTION SUBCUTANEOUS at 10:18

## 2021-08-12 RX ADMIN — SERTRALINE 25 MILLIGRAM(S): 25 TABLET, FILM COATED ORAL at 10:18

## 2021-08-12 NOTE — PROGRESS NOTE ADULT - SUBJECTIVE AND OBJECTIVE BOX
Cheif complaints and Diagnosis: Vitamin B12 Deficiency/ diffculty ambulating/ b/l lower ext pain secondary to neuropathy    Subjective: no complaints; plan of care d/w daughter at bedside      REVIEW OF SYSTEMS:    CONSTITUTIONAL: No weakness, fevers or chills  EYES/ENT: No visual changes;  No vertigo or throat pain   NECK: No pain or stiffness  RESPIRATORY: No cough, wheezing, hemoptysis; No shortness of breath  CARDIOVASCULAR: No chest pain or palpitations  GASTROINTESTINAL: No abdominal or epigastric pain. No nausea, vomiting, or hematemesis; No diarrhea or constipation. No melena or hematochezia.  GENITOURINARY: No dysuria, frequency or hematuria  NEUROLOGICAL: No numbness or weakness  SKIN: No itching, burning, rashes, or lesions   All other review of systems is negative unless indicated above      Vital Signs Last 24 Hrs  T(C): 36.9 (09 Aug 2021 15:11), Max: 36.9 (08 Aug 2021 23:44)  T(F): 98.4 (09 Aug 2021 15:11), Max: 98.4 (08 Aug 2021 23:44)  HR: 82 (09 Aug 2021 15:11) (75 - 93)  BP: 109/52 (09 Aug 2021 15:11) (109/52 - 135/66)  BP(mean): --  RR: 18 (09 Aug 2021 15:11) (18 - 20)  SpO2: 97% (09 Aug 2021 15:11) (96% - 98%)    HEENT:   pupils equal and reactive, EOMI, no oropharyngeal lesions, erythema, exudates, oral thrush    NECK:   supple, no carotid bruits, no palpable lymph nodes, no thyromegaly    CV:  +S1, +S2, regular, no murmurs or rubs    RESP:   lungs clear to auscultation bilaterally, no wheezing, rales, rhonchi, good air entry bilaterally    BREAST:  not examined    GI:  abdomen soft, non-tender, non-distended, normal BS, no bruits, no abdominal masses, no palpable masses    RECTAL:  not examined    :  not examined    MSK:   normal muscle tone, no atrophy, no rigidity, no contractions    EXT:   no clubbing, no cyanosis, no edema, no calf pain, swelling or erythema    VASCULAR:  pulses equal and symmetric in the upper and lower extremities    NEURO:  AAOX3, no focal neurological deficits, follows all commands, able to move extremities spontaneously    SKIN:  no ulcers, lesions or rashes    MEDICATIONS  (STANDING):  aspirin  chewable 81 milliGRAM(s) Oral daily  atorvastatin 80 milliGRAM(s) Oral at bedtime  cyanocobalamin Injectable 1000 MICROGram(s) SubCutaneous daily  dextrose 40% Gel 15 Gram(s) Oral once  dextrose 5%. 1000 milliLiter(s) (50 mL/Hr) IV Continuous <Continuous>  dextrose 5%. 1000 milliLiter(s) (100 mL/Hr) IV Continuous <Continuous>  dextrose 50% Injectable 25 Gram(s) IV Push once  dextrose 50% Injectable 12.5 Gram(s) IV Push once  dextrose 50% Injectable 25 Gram(s) IV Push once  enoxaparin Injectable 40 milliGRAM(s) SubCutaneous daily  folic acid 1 milliGRAM(s) Oral daily  furosemide    Tablet 40 milliGRAM(s) Oral <User Schedule>  gabapentin 200 milliGRAM(s) Oral three times a day  glucagon  Injectable 1 milliGRAM(s) IntraMuscular once  insulin lispro (ADMELOG) corrective regimen sliding scale   SubCutaneous three times a day before meals  insulin lispro (ADMELOG) corrective regimen sliding scale   SubCutaneous at bedtime  losartan 50 milliGRAM(s) Oral daily  omega-3-Acid Ethyl Esters 2 Gram(s) Oral daily  pantoprazole    Tablet 40 milliGRAM(s) Oral before breakfast    MEDICATIONS  (PRN):  acetaminophen   Tablet .. 650 milliGRAM(s) Oral every 6 hours PRN Mild Pain (1 - 3)  meclizine 12.5 milliGRAM(s) Oral daily PRN Dizziness      08 Aug 2021 09:16    142    |  114    |  29     ----------------------------<  169    3.8     |  20     |  0.90     Ca    9.0        08 Aug 2021 09:16    TPro  6.6    /  Alb  2.9    /  TBili  0.3    /  DBili  x      /  AST  14     /  ALT  17     /  AlkPhos  93     08 Aug 2021 09:16  LIVER FUNCTIONS - ( 08 Aug 2021 09:16 )  Alb: 2.9 g/dL / Pro: 6.6 gm/dL / ALK PHOS: 93 U/L / ALT: 17 U/L / AST: 14 U/L / GGT: x         PT/INR - ( 08 Aug 2021 09:16 )   PT: 12.8 sec;   INR: 1.10 ratio         PTT - ( 08 Aug 2021 09:16 )  PTT:27.9 secCBC Full  -  ( 08 Aug 2021 09:16 )  WBC Count : 5.39 K/uL  Hemoglobin : 9.4 g/dL  Hematocrit : 29.3 %  Platelet Count - Automated : 173 K/uL  Mean Cell Volume : 85.7 fl  Mean Cell Hemoglobin : 27.5 pg  Mean Cell Hemoglobin Concentration : 32.1 gm/dL          PT/INR - ( 08 Aug 2021 09:16 )   PT: 12.8 sec;   INR: 1.10 ratio         PTT - ( 08 Aug 2021 09:16 )  PTT:27.9 sec        Assessment and Plan:     85 y/o F w/ PMH of arthritis, HTN, dyslipidemia, CAD s/p PCI, DM2, peripheral neuropathy, osteoporosis, breast cancer s/p R mastectomy, last admission to  5/2021 for fall and L ankle fracture / R suprerior pubic ramus fracture, p/w mechanical fall and difficulty ambulating    *Inability to ambulate secondary to Vitamin B12 deficiency and Neuropathy  -PT consult  -Fall precautions  -started Vitamin B12 Injections  x 3 days , then can change to PO vitamin B12; Will likely need to be re-evaluated by PT after completion of vitamin b12. Daughter wants to take her home. If there is improvment in ambulation on day 3-4, can alina go home with PT, thats what family prefers.   -increased her gabapentin as well; this is really helping her b/l lower extrmity neuropathic pains.       *DM2  -Humalog ISS  -Diabetic diet   -Oral meds held temporarily during hospitalization    *Microcytic anemia  -Trend H/H and if stable -> f/u outpatient for further management     *Low bicarb  -Recheck in AM    *HTN / Dyslipidemia / CAD / peripheral neuropathy / osteoporosis / breast cancer  -C/w home meds and f/u outpatient for further management if conditions remain stable during hospitalization     *Med Reconciliation  -Patient doesn't recall meds. Reconciled meds using EMS document and [facility]. Will need to confirm in AM     *DVT ppx   -Lovenox       off service note:  here for difficulty ambulating. Vitamin B12 low.   -started Vitamin B12 Injections  x 3 days , then can change to PO vitamin B12; Will likely need to be re-evaluated by PT after completion of vitamin b12. Daughter wants to take her home. If there is improvement in ambulation on day 3-4, can likley go home with PT, thats what family prefers.   -increased her gabapentin as well; this is really helping her b/l lower extrmity neuropathic pains.   
  Cheif complaints and Diagnosis: inability to ambulate/ DM    Subjective: no complaints      REVIEW OF SYSTEMS:    CONSTITUTIONAL: No weakness, fevers or chills  EYES/ENT: No visual changes;  No vertigo or throat pain   NECK: No pain or stiffness  RESPIRATORY: No cough, wheezing, hemoptysis; No shortness of breath  CARDIOVASCULAR: No chest pain or palpitations  GASTROINTESTINAL: No abdominal or epigastric pain. No nausea, vomiting, or hematemesis; No diarrhea or constipation. No melena or hematochezia.  GENITOURINARY: No dysuria, frequency or hematuria  NEUROLOGICAL: No numbness or weakness  SKIN: No itching, burning, rashes, or lesions   All other review of systems is negative unless indicated above      Vital Signs Last 24 Hrs  T(C): 36.4 (08 Aug 2021 07:27), Max: 37.1 (07 Aug 2021 11:03)  T(F): 97.5 (08 Aug 2021 07:27), Max: 98.8 (07 Aug 2021 11:03)  HR: 85 (08 Aug 2021 07:27) (81 - 104)  BP: 133/60 (08 Aug 2021 07:27) (98/56 - 133/60)  BP(mean): --  RR: 17 (08 Aug 2021 07:27) (16 - 18)  SpO2: 98% (08 Aug 2021 07:27) (95% - 98%)    HEENT:   pupils equal and reactive, EOMI, no oropharyngeal lesions, erythema, exudates, oral thrush    NECK:   supple, no carotid bruits, no palpable lymph nodes, no thyromegaly    CV:  +S1, +S2, regular, no murmurs or rubs    RESP:   lungs clear to auscultation bilaterally, no wheezing, rales, rhonchi, good air entry bilaterally    BREAST:  not examined    GI:  abdomen soft, non-tender, non-distended, normal BS, no bruits, no abdominal masses, no palpable masses    RECTAL:  not examined    :  not examined    MSK:   normal muscle tone, no atrophy, no rigidity, no contractions    EXT:   no clubbing, no cyanosis, no edema, no calf pain, swelling or erythema    VASCULAR:  pulses equal and symmetric in the upper and lower extremities    NEURO:  AAOX3, no focal neurological deficits, follows all commands, able to move extremities spontaneously    SKIN:  no ulcers, lesions or rashes    MEDICATIONS  (STANDING):  aspirin  chewable 81 milliGRAM(s) Oral daily  atorvastatin 80 milliGRAM(s) Oral at bedtime  dextrose 40% Gel 15 Gram(s) Oral once  dextrose 5%. 1000 milliLiter(s) (50 mL/Hr) IV Continuous <Continuous>  dextrose 5%. 1000 milliLiter(s) (100 mL/Hr) IV Continuous <Continuous>  dextrose 50% Injectable 25 Gram(s) IV Push once  dextrose 50% Injectable 12.5 Gram(s) IV Push once  dextrose 50% Injectable 25 Gram(s) IV Push once  enoxaparin Injectable 40 milliGRAM(s) SubCutaneous daily  furosemide    Tablet 40 milliGRAM(s) Oral <User Schedule>  gabapentin 100 milliGRAM(s) Oral two times a day  glucagon  Injectable 1 milliGRAM(s) IntraMuscular once  insulin lispro (ADMELOG) corrective regimen sliding scale   SubCutaneous three times a day before meals  insulin lispro (ADMELOG) corrective regimen sliding scale   SubCutaneous at bedtime  losartan 50 milliGRAM(s) Oral daily  omega-3-Acid Ethyl Esters 2 Gram(s) Oral daily  pantoprazole    Tablet 40 milliGRAM(s) Oral before breakfast    MEDICATIONS  (PRN):  acetaminophen   Tablet .. 650 milliGRAM(s) Oral every 6 hours PRN Mild Pain (1 - 3)  meclizine 12.5 milliGRAM(s) Oral daily PRN Dizziness      07 Aug 2021 19:30    141    |  114    |  29     ----------------------------<  123    4.2     |  21     |  0.95     Ca    9.4        07 Aug 2021 19:30    TPro  6.5    /  Alb  3.0    /  TBili  0.3    /  DBili  x      /  AST  13     /  ALT  18     /  AlkPhos  97     07 Aug 2021 19:30  LIVER FUNCTIONS - ( 07 Aug 2021 19:30 )  Alb: 3.0 g/dL / Pro: 6.5 gm/dL / ALK PHOS: 97 U/L / ALT: 18 U/L / AST: 13 U/L / GGT: x         CBC Full  -  ( 07 Aug 2021 19:30 )  WBC Count : 8.92 K/uL  Hemoglobin : 9.8 g/dL  Hematocrit : 30.8 %  Platelet Count - Automated : 192 K/uL  Mean Cell Volume : 86.5 fl  Mean Cell Hemoglobin : 27.5 pg  Mean Cell Hemoglobin Concentration : 31.8 gm/dL            Assessment and Plan:     87 y/o F w/ PMH of arthritis, HTN, dyslipidemia, CAD s/p PCI, DM2, peripheral neuropathy, osteoporosis, breast cancer s/p R mastectomy, last admission to  5/2021 for fall and L ankle fracture / R suprerior pubic ramus fracture, p/w mechanical fall and difficulty ambulating    *Inability to ambulate 2/2 deconditioning   -PT consult  -Fall precautions  -Vitamin B12 / TSH       *DM2  -Humalog ISS  -Diabetic diet   -Oral meds held temporarily during hospitalization    *Microcytic anemia  -Trend H/H and if stable -> f/u outpatient for further management     *Low bicarb  -Recheck in AM    *HTN / Dyslipidemia / CAD / peripheral neuropathy / osteoporosis / breast cancer  -C/w home meds and f/u outpatient for further management if conditions remain stable during hospitalization     *Med Reconciliation  -Patient doesn't recall meds. Reconciled meds using EMS document and [facility]. Will need to confirm in AM     *DVT ppx   -Lovenox     
HOSPITALIST PROGRESS NOTE:  SUBJECTIVE:  PCP:  Chief Complaint: Patient is a 86y old  Female who presents with a chief complaint of mechanical fall and difficulty ambulating (09 Aug 2021 16:40)      HPI:  85 y/o F w/ PMH of arthritis, HTN, dyslipidemia, CAD s/p PCI, DM2, peripheral neuropathy, osteoporosis, breast cancer s/p R mastectomy, last admission to  5/2021 for fall and L ankle fracture / R suprerior pubic ramus fracture, p/w mechanical fall and difficulty ambulating. Patient was woken up from sleep. Initially states she fell 1 year ago and then states she fell 2 weeks ago. As per paperwork from EMS, daughter had reported that patient fell 2 days ago, but was helped by aid to the ground. Patient being admitted due to decreased ability to ambulate. Patient states she lives alone at home with 24 hour aide. C/o B/L knee pain. Denies any other complaints. Denies CP, SOB, cough, runny nose, sore throat, nausea, vomiting, abdominal pain, fever, chills   PSH:  R mastectomy     Social Hx: Denies x 3    Family Hx: Father-lung ca; Mother-uterine ca   (08 Aug 2021 01:52)      8/10: patient now open to rehab; still having difficulty walking very unsteady     Allergies:  codeine (Other)  contrast media (gadolinium-based) (Anaphylaxis)  Darvon (Other)    REVIEW OF SYSTEMS:  See HPI. All other review of systems is negative unless indicated above.     OBJECTIVE  Physical Exam:  Vital Signs:    Vital Signs Last 24 Hrs  T(C): 36.5 (10 Aug 2021 08:07), Max: 36.5 (10 Aug 2021 08:07)  T(F): 97.7 (10 Aug 2021 08:07), Max: 97.7 (10 Aug 2021 08:07)  HR: 75 (10 Aug 2021 08:07) (75 - 84)  BP: 118/49 (10 Aug 2021 08:07) (116/46 - 118/49)  BP(mean): --  RR: 18 (10 Aug 2021 08:07) (18 - 18)  SpO2: 99% (10 Aug 2021 08:07) (97% - 99%)  I&O's Summary    09 Aug 2021 07:01  -  10 Aug 2021 07:00  --------------------------------------------------------  IN: 0 mL / OUT: 750 mL / NET: -750 mL        Constitutional: NAD, awake and alert,   Neurological: AAO x 3, no focal deficits  HEENT: PERRLA, EOMI, MMM  Neck: Soft and supple, No LAD, No JVD  Respiratory: Breath sounds are clear bilaterally, No wheezing, rales or rhonchi  Cardiovascular: S1 and S2, regular rate and rhythm; no Murmurs, gallops or rubs  Gastrointestinal: Bowel Sounds present, soft, nontender, nondistended, no guarding, no rebound tenderness  Back: No CVA tenderness   Extremities: No peripheral edema  Vascular: 2+ peripheral pulses  Musculoskeletal: 5/5 strength b/l upper and lower extremities  Skin: No rashes  Breast: Deferred  Rectal: Deferred    MEDICATIONS  (STANDING):  aspirin  chewable 81 milliGRAM(s) Oral daily  atorvastatin 80 milliGRAM(s) Oral at bedtime  cyanocobalamin Injectable 1000 MICROGram(s) SubCutaneous daily  dextrose 40% Gel 15 Gram(s) Oral once  dextrose 5%. 1000 milliLiter(s) (50 mL/Hr) IV Continuous <Continuous>  dextrose 5%. 1000 milliLiter(s) (100 mL/Hr) IV Continuous <Continuous>  dextrose 50% Injectable 25 Gram(s) IV Push once  dextrose 50% Injectable 12.5 Gram(s) IV Push once  dextrose 50% Injectable 25 Gram(s) IV Push once  enoxaparin Injectable 40 milliGRAM(s) SubCutaneous daily  folic acid 1 milliGRAM(s) Oral daily  furosemide    Tablet 40 milliGRAM(s) Oral <User Schedule>  gabapentin 200 milliGRAM(s) Oral three times a day  glucagon  Injectable 1 milliGRAM(s) IntraMuscular once  insulin lispro (ADMELOG) corrective regimen sliding scale   SubCutaneous three times a day before meals  insulin lispro (ADMELOG) corrective regimen sliding scale   SubCutaneous at bedtime  losartan 50 milliGRAM(s) Oral daily  omega-3-Acid Ethyl Esters 2 Gram(s) Oral daily  pantoprazole    Tablet 40 milliGRAM(s) Oral before breakfast      LABS: All Labs Reviewed:      RADIOLOGY/EKG:    x< from: Xray Chest 1 View- PORTABLE-Urgent (Xray Chest 1 View- PORTABLE-Urgent .) (08.07.21 @ 17:30) >    IMPRESSION: No acute finding or change.    < end of copied text >    `< from: Xray Femur 2 Views, Bilat (08.07.21 @ 14:06) >    IMPRESSION:No sign of acute fracture, osseous destruction or soft tissue abnormality.      < end of copied text >  < from: Xray Tibia + Fibula 2 Views, Bilateral (08.07.21 @ 14:05) >    IMPRESSION:    No fracture or dislocation.    Moderate degenerative change bilateral knees.    --- End of Report ---    < end of copied text >  < from: Xray Hip w/ Pelvis 2 Views, Bilateral (08.07.21 @ 14:00) >    IMPRESSION:    No definite pelvic fracture.      < end of copied text >          
HOSPITALIST PROGRESS NOTE:  SUBJECTIVE:  PCP:  Chief Complaint: Patient is a 86y old  Female who presents with a chief complaint of mechanical fall and difficulty ambulating (09 Aug 2021 16:40)      HPI:  85 y/o F w/ PMH of arthritis, HTN, dyslipidemia, CAD s/p PCI, DM2, peripheral neuropathy, osteoporosis, breast cancer s/p R mastectomy, last admission to  5/2021 for fall and L ankle fracture / R suprerior pubic ramus fracture, p/w mechanical fall and difficulty ambulating. Patient was woken up from sleep. Initially states she fell 1 year ago and then states she fell 2 weeks ago. As per paperwork from EMS, daughter had reported that patient fell 2 days ago, but was helped by aid to the ground. Patient being admitted due to decreased ability to ambulate. Patient states she lives alone at home with 24 hour aide. C/o B/L knee pain. Denies any other complaints. Denies CP, SOB, cough, runny nose, sore throat, nausea, vomiting, abdominal pain, fever, chills   PSH:  R mastectomy     Social Hx: Denies x 3    Family Hx: Father-lung ca; Mother-uterine ca   (08 Aug 2021 01:52)      8/10: patient now open to rehab; still having difficulty walking very unsteady   8/11:  Patients neuropathy better; Awaiting rehab    Allergies:  codeine (Other)  contrast media (gadolinium-based) (Anaphylaxis)  Darvon (Other)    REVIEW OF SYSTEMS:  See HPI. All other review of systems is negative unless indicated above.     OBJECTIVE  Physical Exam:  Vital Signs Last 24 Hrs  T(C): 36.3 (11 Aug 2021 08:09), Max: 36.7 (10 Aug 2021 15:04)  T(F): 97.4 (11 Aug 2021 08:09), Max: 98 (10 Aug 2021 15:04)  HR: 74 (11 Aug 2021 08:09) (72 - 94)  BP: 108/50 (11 Aug 2021 08:09) (90/42 - 111/50)  BP(mean): --  RR: 17 (11 Aug 2021 08:09) (17 - 18)  SpO2: 99% (11 Aug 2021 08:09) (97% - 99%)        Constitutional: NAD, awake and alert,   Neurological: , no focal deficits  HEENT: PERRLA, EOMI, MMM  Neck: Soft and supple, No LAD, No JVD  Respiratory: Breath sounds are clear bilaterally, No wheezing, rales or rhonchi  Cardiovascular: S1 and S2, regular rate and rhythm; no Murmurs, gallops or rubs  Gastrointestinal: Bowel Sounds present, soft, nontender, nondistended, no guarding, no rebound tenderness  Back: No CVA tenderness   Extremities: No peripheral edema  Vascular: 2+ peripheral pulses  Musculoskeletal: 5/5 strength b/l upper and lower extremities  Skin: No rashes  Breast: Deferred  Rectal: Deferred    MEDICATIONS  (STANDING):  aspirin  chewable 81 milliGRAM(s) Oral daily  atorvastatin 80 milliGRAM(s) Oral at bedtime  cyanocobalamin Injectable 1000 MICROGram(s) SubCutaneous daily  dextrose 40% Gel 15 Gram(s) Oral once  dextrose 5%. 1000 milliLiter(s) (50 mL/Hr) IV Continuous <Continuous>  dextrose 5%. 1000 milliLiter(s) (100 mL/Hr) IV Continuous <Continuous>  dextrose 50% Injectable 25 Gram(s) IV Push once  dextrose 50% Injectable 12.5 Gram(s) IV Push once  dextrose 50% Injectable 25 Gram(s) IV Push once  enoxaparin Injectable 40 milliGRAM(s) SubCutaneous daily  folic acid 1 milliGRAM(s) Oral daily  furosemide    Tablet 40 milliGRAM(s) Oral <User Schedule>  gabapentin 200 milliGRAM(s) Oral three times a day  glucagon  Injectable 1 milliGRAM(s) IntraMuscular once  insulin lispro (ADMELOG) corrective regimen sliding scale   SubCutaneous three times a day before meals  insulin lispro (ADMELOG) corrective regimen sliding scale   SubCutaneous at bedtime  losartan 50 milliGRAM(s) Oral daily  omega-3-Acid Ethyl Esters 2 Gram(s) Oral daily  pantoprazole    Tablet 40 milliGRAM(s) Oral before breakfast      LABS:  Lab Results:  CBC  CBC Full  -  ( 11 Aug 2021 10:54 )  WBC Count : 8.04 K/uL  RBC Count : 3.79 M/uL  Hemoglobin : 10.6 g/dL  Hematocrit : 32.9 %  Platelet Count - Automated : 260 K/uL  Mean Cell Volume : 86.8 fl  Mean Cell Hemoglobin : 28.0 pg  Mean Cell Hemoglobin Concentration : 32.2 gm/dL  Auto Neutrophil # : x  Auto Lymphocyte # : x  Auto Monocyte # : x  Auto Eosinophil # : x  Auto Basophil # : x  Auto Neutrophil % : x  Auto Lymphocyte % : x  Auto Monocyte % : x  Auto Eosinophil % : x  Auto Basophil % : x    .		Differential:	[] Automated		[] Manual  Chemistry                        10.6   8.04  )-----------( 260      ( 11 Aug 2021 10:54 )             32.9     08-11    140  |  112<H>  |  36<H>  ----------------------------<  209<H>  4.0   |  20<L>  |  0.97    Ca    8.9      11 Aug 2021 10:54  Phos  3.5     08-11  Mg     1.7     08-11    TPro  7.0  /  Alb  3.2<L>  /  TBili  0.2  /  DBili  x   /  AST  16  /  ALT  20  /  AlkPhos  103  08-11    LIVER FUNCTIONS - ( 11 Aug 2021 10:54 )  Alb: 3.2 g/dL / Pro: 7.0 gm/dL / ALK PHOS: 103 U/L / ALT: 20 U/L / AST: 16 U/L / GGT: x               RADIOLOGY/EKG:    x< from: Xray Chest 1 View- PORTABLE-Urgent (Xray Chest 1 View- PORTABLE-Urgent .) (08.07.21 @ 17:30) >    IMPRESSION: No acute finding or change.    < end of copied text >    `< from: Xray Femur 2 Views, Bilat (08.07.21 @ 14:06) >    IMPRESSION:No sign of acute fracture, osseous destruction or soft tissue abnormality.      < end of copied text >  < from: Xray Tibia + Fibula 2 Views, Bilateral (08.07.21 @ 14:05) >    IMPRESSION:    No fracture or dislocation.    Moderate degenerative change bilateral knees.    --- End of Report ---    < end of copied text >  < from: Xray Hip w/ Pelvis 2 Views, Bilateral (08.07.21 @ 14:00) >    IMPRESSION:    No definite pelvic fracture.      < end of copied text >          
HOSPITALIST PROGRESS NOTE:  SUBJECTIVE:  PCP:  Chief Complaint: Patient is a 86y old  Female who presents with a chief complaint of mechanical fall and difficulty ambulating (09 Aug 2021 16:40)      HPI:  87 y/o F w/ PMH of arthritis, HTN, dyslipidemia, CAD s/p PCI, DM2, peripheral neuropathy, osteoporosis, breast cancer s/p R mastectomy, last admission to  5/2021 for fall and L ankle fracture / R suprerior pubic ramus fracture, p/w mechanical fall and difficulty ambulating. Patient was woken up from sleep. Initially states she fell 1 year ago and then states she fell 2 weeks ago. As per paperwork from EMS, daughter had reported that patient fell 2 days ago, but was helped by aid to the ground. Patient being admitted due to decreased ability to ambulate. Patient states she lives alone at home with 24 hour aide. C/o B/L knee pain. Denies any other complaints. Denies CP, SOB, cough, runny nose, sore throat, nausea, vomiting, abdominal pain, fever, chills   PSH:  R mastectomy     Social Hx: Denies x 3    Family Hx: Father-lung ca; Mother-uterine ca   (08 Aug 2021 01:52)      8/10: patient now open to rehab; still having difficulty walking very unsteady   8/11:  Patients neuropathy better; Awaiting rehab  8/12: Patient has no complaints. no beds at rehab and patient is now going home     Allergies:  codeine (Other)  contrast media (gadolinium-based) (Anaphylaxis)  Darvon (Other)    REVIEW OF SYSTEMS:  See HPI. All other review of systems is negative unless indicated above.     OBJECTIVE  Physical Exam:  Vital Signs Last 24 Hrs  T(C): 36.3 (12 Aug 2021 07:58), Max: 36.6 (11 Aug 2021 15:00)  T(F): 97.4 (12 Aug 2021 07:58), Max: 97.9 (11 Aug 2021 15:00)  HR: 84 (12 Aug 2021 09:42) (75 - 84)  BP: 118/50 (12 Aug 2021 09:42) (112/48 - 119/44)  BP(mean): --  RR: 18 (12 Aug 2021 07:58) (18 - 18)  SpO2: 99% (12 Aug 2021 07:58) (98% - 99%)      Constitutional: NAD, awake and alert,   Neurological: , no focal deficits  HEENT: PERRLA, EOMI, MMM  Neck: Soft and supple, No LAD, No JVD  Respiratory: Breath sounds are clear bilaterally, No wheezing, rales or rhonchi  Cardiovascular: S1 and S2, regular rate and rhythm; no Murmurs, gallops or rubs  Gastrointestinal: Bowel Sounds present, soft, nontender, nondistended, no guarding, no rebound tenderness  Back: No CVA tenderness   Extremities: No peripheral edema  Vascular: 2+ peripheral pulses  Musculoskeletal: 5/5 strength b/l upper and lower extremities  Skin: No rashes  Breast: Deferred  Rectal: Deferred    MEDICATIONS  (STANDING):  aspirin  chewable 81 milliGRAM(s) Oral daily  atorvastatin 80 milliGRAM(s) Oral at bedtime  cyanocobalamin Injectable 1000 MICROGram(s) SubCutaneous daily  dextrose 40% Gel 15 Gram(s) Oral once  dextrose 5%. 1000 milliLiter(s) (50 mL/Hr) IV Continuous <Continuous>  dextrose 5%. 1000 milliLiter(s) (100 mL/Hr) IV Continuous <Continuous>  dextrose 50% Injectable 25 Gram(s) IV Push once  dextrose 50% Injectable 12.5 Gram(s) IV Push once  dextrose 50% Injectable 25 Gram(s) IV Push once  enoxaparin Injectable 40 milliGRAM(s) SubCutaneous daily  folic acid 1 milliGRAM(s) Oral daily  furosemide    Tablet 40 milliGRAM(s) Oral <User Schedule>  gabapentin 200 milliGRAM(s) Oral three times a day  glucagon  Injectable 1 milliGRAM(s) IntraMuscular once  insulin lispro (ADMELOG) corrective regimen sliding scale   SubCutaneous three times a day before meals  insulin lispro (ADMELOG) corrective regimen sliding scale   SubCutaneous at bedtime  losartan 50 milliGRAM(s) Oral daily  omega-3-Acid Ethyl Esters 2 Gram(s) Oral daily  pantoprazole    Tablet 40 milliGRAM(s) Oral before breakfast      LABS:  Lab Results:  CBC  CBC Full  -  ( 11 Aug 2021 10:54 )  WBC Count : 8.04 K/uL  RBC Count : 3.79 M/uL  Hemoglobin : 10.6 g/dL  Hematocrit : 32.9 %  Platelet Count - Automated : 260 K/uL  Mean Cell Volume : 86.8 fl  Mean Cell Hemoglobin : 28.0 pg  Mean Cell Hemoglobin Concentration : 32.2 gm/dL  Auto Neutrophil # : x  Auto Lymphocyte # : x  Auto Monocyte # : x  Auto Eosinophil # : x  Auto Basophil # : x  Auto Neutrophil % : x  Auto Lymphocyte % : x  Auto Monocyte % : x  Auto Eosinophil % : x  Auto Basophil % : x    .		Differential:	[] Automated		[] Manual  Chemistry                        10.6   8.04  )-----------( 260      ( 11 Aug 2021 10:54 )             32.9     08-11    140  |  112<H>  |  36<H>  ----------------------------<  209<H>  4.0   |  20<L>  |  0.97    Ca    8.9      11 Aug 2021 10:54  Phos  3.5     08-11  Mg     1.7     08-11    TPro  7.0  /  Alb  3.2<L>  /  TBili  0.2  /  DBili  x   /  AST  16  /  ALT  20  /  AlkPhos  103  08-11    LIVER FUNCTIONS - ( 11 Aug 2021 10:54 )  Alb: 3.2 g/dL / Pro: 7.0 gm/dL / ALK PHOS: 103 U/L / ALT: 20 U/L / AST: 16 U/L / GGT: x               RADIOLOGY/EKG:    x< from: Xray Chest 1 View- PORTABLE-Urgent (Xray Chest 1 View- PORTABLE-Urgent .) (08.07.21 @ 17:30) >    IMPRESSION: No acute finding or change.    < end of copied text >    `< from: Xray Femur 2 Views, Bilat (08.07.21 @ 14:06) >    IMPRESSION:No sign of acute fracture, osseous destruction or soft tissue abnormality.      < end of copied text >  < from: Xray Tibia + Fibula 2 Views, Bilateral (08.07.21 @ 14:05) >    IMPRESSION:    No fracture or dislocation.    Moderate degenerative change bilateral knees.    --- End of Report ---    < end of copied text >  < from: Xray Hip w/ Pelvis 2 Views, Bilateral (08.07.21 @ 14:00) >    IMPRESSION:    No definite pelvic fracture.      < end of copied text >

## 2021-08-12 NOTE — PROGRESS NOTE ADULT - ASSESSMENT
85 y/o F w/ PMH of arthritis, HTN, dyslipidemia, CAD s/p PCI, DM2, peripheral neuropathy, osteoporosis, breast cancer s/p R mastectomy, last admission to  5/2021 for fall and L ankle fracture / R suprerior pubic ramus fracture, p/w mechanical fall and difficulty ambulating    *Inability to ambulate secondary to Vitamin B12 deficiency and Neuropathy  -xrays of lE negative for fractures   -PT consult  -Fall precautions  -started Vitamin B12 Injections  x 3 days , then can change to PO vitamin B12; Will likely need to be re-evaluated by PT after completion of vitamin b12. Daughter wants to take her home. If there is improvment in ambulation on day 3-4, can likley go home with PT, thats what family prefers.   -increased her gabapentin as well; this is really helping her b/l lower extremity neuropathic pains.   -patient now agreeing to rehab    *DM2  -Humalog ISS  -Diabetic diet   -Oral meds held temporarily during hospitalization    *Microcytic anemia  -Trend H/H and if stable -> f/u outpatient for further management     *Low bicarb  -Recheck in AM    *HTN / Dyslipidemia / CAD / peripheral neuropathy / osteoporosis / breast cancer  -C/w home meds and f/u outpatient for further management if conditions remain stable during hospitalization       *DVT ppx   -Lovenox       off service note:  here for difficulty ambulating. Vitamin B12 low.   -started Vitamin B12 Injections  x 3 days , then can change to PO vitamin B12; Will likely need to be re-evaluated by PT after completion of vitamin b12. Daughter wants to take her home. If there is improvement in ambulation on day 3-4, can likley go home with PT, thats what family prefers.   -increased her gabapentin as well; this is really helping her b/l lower extrmity neuropathic pains.
  85 y/o F w/ PMH of arthritis, HTN, dyslipidemia, CAD s/p PCI, DM2, peripheral neuropathy, osteoporosis, breast cancer s/p R mastectomy, last admission to  5/2021 for fall and L ankle fracture / R suprerior pubic ramus fracture, p/w mechanical fall and difficulty ambulating    *Inability to ambulate secondary to Vitamin B12 deficiency and Neuropathy  -xrays of lE negative for fractures   -PT consult  -Fall precautions  -s/p Vitamin B12 Injections  x 3 days , then can change to PO vitamin B12;  -increased her gabapentin as well; this is really helping her b/l lower extremity neuropathic pains.   -    *DM2  -Humalog ISS  -Diabetic diet   -Oral meds held temporarily during hospitalization    *Microcytic anemia  -Trend H/H and if stable -> f/u outpatient for further management     *Low bicarb  -Recheck in AM    *HTN / Dyslipidemia / CAD / peripheral neuropathy / osteoporosis / breast cancer  -C/w home meds and f/u outpatient for further management if conditions remain stable during hospitalization       *DVT ppx   -Lovenox       off service note:  here for difficulty ambulating. Vitamin B12 low.   -started Vitamin B12 Injections  x 3 days , then can change to PO vitamin B12; -increased her gabapentin as well; this is really helping her b/l lower extrmity neuropathic pains. no rehab beds and patient is planning to go home with nurses aids 
  87 y/o F w/ PMH of arthritis, HTN, dyslipidemia, CAD s/p PCI, DM2, peripheral neuropathy, osteoporosis, breast cancer s/p R mastectomy, last admission to  5/2021 for fall and L ankle fracture / R suprerior pubic ramus fracture, p/w mechanical fall and difficulty ambulating    *Inability to ambulate secondary to Vitamin B12 deficiency and Neuropathy  -xrays of lE negative for fractures   -PT consult  -Fall precautions  -started Vitamin B12 Injections  x 3 days , then can change to PO vitamin B12; Will likely need to be re-evaluated by PT after completion of vitamin b12. Daughter wants to take her home. If there is improvment in ambulation on day 3-4, can likley go home with PT, thats what family prefers.   -increased her gabapentin as well; this is really helping her b/l lower extremity neuropathic pains.   -patient now agreeing to rehab    *DM2  -Humalog ISS  -Diabetic diet   -Oral meds held temporarily during hospitalization    *Microcytic anemia  -Trend H/H and if stable -> f/u outpatient for further management     *Low bicarb  -Recheck in AM    *HTN / Dyslipidemia / CAD / peripheral neuropathy / osteoporosis / breast cancer  -C/w home meds and f/u outpatient for further management if conditions remain stable during hospitalization       *DVT ppx   -Lovenox       off service note:  here for difficulty ambulating. Vitamin B12 low.   -started Vitamin B12 Injections  x 3 days , then can change to PO vitamin B12; Will likely need to be re-evaluated by PT after completion of vitamin b12. Daughter wants to take her home. If there is improvement in ambulation on day 3-4, can likley go home with PT, thats what family prefers.   -increased her gabapentin as well; this is really helping her b/l lower extrmity neuropathic pains.

## 2021-08-12 NOTE — PROGRESS NOTE ADULT - REASON FOR ADMISSION
mechanical fall and difficulty ambulating

## 2021-08-17 ENCOUNTER — INPATIENT (INPATIENT)
Facility: HOSPITAL | Age: 86
LOS: 1 days | Discharge: NURSING, REHAB OR LONG-TERM CARE | DRG: 392 | End: 2021-08-19
Attending: FAMILY MEDICINE | Admitting: HOSPITALIST
Payer: MEDICARE

## 2021-08-17 VITALS
RESPIRATION RATE: 20 BRPM | DIASTOLIC BLOOD PRESSURE: 55 MMHG | SYSTOLIC BLOOD PRESSURE: 130 MMHG | OXYGEN SATURATION: 96 % | HEART RATE: 89 BPM

## 2021-08-17 DIAGNOSIS — M19.90 UNSPECIFIED OSTEOARTHRITIS, UNSPECIFIED SITE: ICD-10-CM

## 2021-08-17 DIAGNOSIS — D64.9 ANEMIA, UNSPECIFIED: ICD-10-CM

## 2021-08-17 DIAGNOSIS — Z91.041 RADIOGRAPHIC DYE ALLERGY STATUS: ICD-10-CM

## 2021-08-17 DIAGNOSIS — M79.662 PAIN IN LEFT LOWER LEG: ICD-10-CM

## 2021-08-17 DIAGNOSIS — E78.5 HYPERLIPIDEMIA, UNSPECIFIED: ICD-10-CM

## 2021-08-17 DIAGNOSIS — M79.661 PAIN IN RIGHT LOWER LEG: ICD-10-CM

## 2021-08-17 DIAGNOSIS — I10 ESSENTIAL (PRIMARY) HYPERTENSION: ICD-10-CM

## 2021-08-17 DIAGNOSIS — Z88.8 ALLERGY STATUS TO OTHER DRUGS, MEDICAMENTS AND BIOLOGICAL SUBSTANCES: ICD-10-CM

## 2021-08-17 DIAGNOSIS — R07.9 CHEST PAIN, UNSPECIFIED: ICD-10-CM

## 2021-08-17 DIAGNOSIS — Z85.3 PERSONAL HISTORY OF MALIGNANT NEOPLASM OF BREAST: ICD-10-CM

## 2021-08-17 DIAGNOSIS — E11.40 TYPE 2 DIABETES MELLITUS WITH DIABETIC NEUROPATHY, UNSPECIFIED: ICD-10-CM

## 2021-08-17 DIAGNOSIS — Z79.82 LONG TERM (CURRENT) USE OF ASPIRIN: ICD-10-CM

## 2021-08-17 DIAGNOSIS — Z79.4 LONG TERM (CURRENT) USE OF INSULIN: ICD-10-CM

## 2021-08-17 DIAGNOSIS — E53.8 DEFICIENCY OF OTHER SPECIFIED B GROUP VITAMINS: ICD-10-CM

## 2021-08-17 DIAGNOSIS — M81.0 AGE-RELATED OSTEOPOROSIS WITHOUT CURRENT PATHOLOGICAL FRACTURE: ICD-10-CM

## 2021-08-17 DIAGNOSIS — Z88.5 ALLERGY STATUS TO NARCOTIC AGENT: ICD-10-CM

## 2021-08-17 DIAGNOSIS — G89.11 ACUTE PAIN DUE TO TRAUMA: ICD-10-CM

## 2021-08-17 LAB
BASOPHILS # BLD AUTO: 0.05 K/UL — SIGNIFICANT CHANGE UP (ref 0–0.2)
BASOPHILS NFR BLD AUTO: 0.5 % — SIGNIFICANT CHANGE UP (ref 0–2)
EOSINOPHIL # BLD AUTO: 0.19 K/UL — SIGNIFICANT CHANGE UP (ref 0–0.5)
EOSINOPHIL NFR BLD AUTO: 2.1 % — SIGNIFICANT CHANGE UP (ref 0–6)
HCT VFR BLD CALC: 30.7 % — LOW (ref 34.5–45)
HGB BLD-MCNC: 9.7 G/DL — LOW (ref 11.5–15.5)
IMM GRANULOCYTES NFR BLD AUTO: 1.2 % — SIGNIFICANT CHANGE UP (ref 0–1.5)
LYMPHOCYTES # BLD AUTO: 1.08 K/UL — SIGNIFICANT CHANGE UP (ref 1–3.3)
LYMPHOCYTES # BLD AUTO: 11.9 % — LOW (ref 13–44)
MCHC RBC-ENTMCNC: 27.7 PG — SIGNIFICANT CHANGE UP (ref 27–34)
MCHC RBC-ENTMCNC: 31.6 GM/DL — LOW (ref 32–36)
MCV RBC AUTO: 87.7 FL — SIGNIFICANT CHANGE UP (ref 80–100)
MONOCYTES # BLD AUTO: 0.54 K/UL — SIGNIFICANT CHANGE UP (ref 0–0.9)
MONOCYTES NFR BLD AUTO: 5.9 % — SIGNIFICANT CHANGE UP (ref 2–14)
NEUTROPHILS # BLD AUTO: 7.13 K/UL — SIGNIFICANT CHANGE UP (ref 1.8–7.4)
NEUTROPHILS NFR BLD AUTO: 78.4 % — HIGH (ref 43–77)
PLATELET # BLD AUTO: 236 K/UL — SIGNIFICANT CHANGE UP (ref 150–400)
RBC # BLD: 3.5 M/UL — LOW (ref 3.8–5.2)
RBC # FLD: 14.5 % — SIGNIFICANT CHANGE UP (ref 10.3–14.5)
SARS-COV-2 RNA SPEC QL NAA+PROBE: SIGNIFICANT CHANGE UP
TROPONIN I SERPL-MCNC: <0.015 NG/ML — SIGNIFICANT CHANGE UP (ref 0.01–0.04)
WBC # BLD: 9.1 K/UL — SIGNIFICANT CHANGE UP (ref 3.8–10.5)
WBC # FLD AUTO: 9.1 K/UL — SIGNIFICANT CHANGE UP (ref 3.8–10.5)

## 2021-08-17 PROCEDURE — 84484 ASSAY OF TROPONIN QUANT: CPT

## 2021-08-17 PROCEDURE — 82962 GLUCOSE BLOOD TEST: CPT

## 2021-08-17 PROCEDURE — 99285 EMERGENCY DEPT VISIT HI MDM: CPT | Mod: CS,GC

## 2021-08-17 PROCEDURE — 99222 1ST HOSP IP/OBS MODERATE 55: CPT

## 2021-08-17 PROCEDURE — 85027 COMPLETE CBC AUTOMATED: CPT

## 2021-08-17 PROCEDURE — 71045 X-RAY EXAM CHEST 1 VIEW: CPT | Mod: 26

## 2021-08-17 PROCEDURE — 80048 BASIC METABOLIC PNL TOTAL CA: CPT

## 2021-08-17 PROCEDURE — 36415 COLL VENOUS BLD VENIPUNCTURE: CPT

## 2021-08-17 PROCEDURE — 86769 SARS-COV-2 COVID-19 ANTIBODY: CPT

## 2021-08-17 PROCEDURE — 85025 COMPLETE CBC W/AUTO DIFF WBC: CPT

## 2021-08-17 PROCEDURE — 93306 TTE W/DOPPLER COMPLETE: CPT | Mod: 26

## 2021-08-17 PROCEDURE — 97530 THERAPEUTIC ACTIVITIES: CPT | Mod: GP

## 2021-08-17 PROCEDURE — 97116 GAIT TRAINING THERAPY: CPT | Mod: GP

## 2021-08-17 PROCEDURE — 80053 COMPREHEN METABOLIC PANEL: CPT

## 2021-08-17 PROCEDURE — 93010 ELECTROCARDIOGRAM REPORT: CPT

## 2021-08-17 PROCEDURE — 97163 PT EVAL HIGH COMPLEX 45 MIN: CPT | Mod: GP

## 2021-08-17 PROCEDURE — 93306 TTE W/DOPPLER COMPLETE: CPT

## 2021-08-17 RX ORDER — LANOLIN ALCOHOL/MO/W.PET/CERES
3 CREAM (GRAM) TOPICAL AT BEDTIME
Refills: 0 | Status: DISCONTINUED | OUTPATIENT
Start: 2021-08-17 | End: 2021-08-19

## 2021-08-17 RX ORDER — ATORVASTATIN CALCIUM 80 MG/1
80 TABLET, FILM COATED ORAL AT BEDTIME
Refills: 0 | Status: DISCONTINUED | OUTPATIENT
Start: 2021-08-17 | End: 2021-08-19

## 2021-08-17 RX ORDER — SERTRALINE 25 MG/1
25 TABLET, FILM COATED ORAL DAILY
Refills: 0 | Status: DISCONTINUED | OUTPATIENT
Start: 2021-08-17 | End: 2021-08-19

## 2021-08-17 RX ORDER — MECLIZINE HCL 12.5 MG
12.5 TABLET ORAL DAILY
Refills: 0 | Status: DISCONTINUED | OUTPATIENT
Start: 2021-08-17 | End: 2021-08-19

## 2021-08-17 RX ORDER — DEXTROSE 50 % IN WATER 50 %
25 SYRINGE (ML) INTRAVENOUS ONCE
Refills: 0 | Status: DISCONTINUED | OUTPATIENT
Start: 2021-08-17 | End: 2021-08-19

## 2021-08-17 RX ORDER — GLUCAGON INJECTION, SOLUTION 0.5 MG/.1ML
1 INJECTION, SOLUTION SUBCUTANEOUS ONCE
Refills: 0 | Status: DISCONTINUED | OUTPATIENT
Start: 2021-08-17 | End: 2021-08-19

## 2021-08-17 RX ORDER — DEXTROSE 50 % IN WATER 50 %
15 SYRINGE (ML) INTRAVENOUS ONCE
Refills: 0 | Status: DISCONTINUED | OUTPATIENT
Start: 2021-08-17 | End: 2021-08-19

## 2021-08-17 RX ORDER — SODIUM CHLORIDE 9 MG/ML
1000 INJECTION, SOLUTION INTRAVENOUS
Refills: 0 | Status: DISCONTINUED | OUTPATIENT
Start: 2021-08-17 | End: 2021-08-19

## 2021-08-17 RX ORDER — LOSARTAN POTASSIUM 100 MG/1
50 TABLET, FILM COATED ORAL DAILY
Refills: 0 | Status: DISCONTINUED | OUTPATIENT
Start: 2021-08-17 | End: 2021-08-19

## 2021-08-17 RX ORDER — ONDANSETRON 8 MG/1
4 TABLET, FILM COATED ORAL EVERY 8 HOURS
Refills: 0 | Status: DISCONTINUED | OUTPATIENT
Start: 2021-08-17 | End: 2021-08-19

## 2021-08-17 RX ORDER — GABAPENTIN 400 MG/1
200 CAPSULE ORAL THREE TIMES A DAY
Refills: 0 | Status: DISCONTINUED | OUTPATIENT
Start: 2021-08-17 | End: 2021-08-19

## 2021-08-17 RX ORDER — ASPIRIN/CALCIUM CARB/MAGNESIUM 324 MG
81 TABLET ORAL DAILY
Refills: 0 | Status: DISCONTINUED | OUTPATIENT
Start: 2021-08-17 | End: 2021-08-19

## 2021-08-17 RX ORDER — DEXTROSE 50 % IN WATER 50 %
12.5 SYRINGE (ML) INTRAVENOUS ONCE
Refills: 0 | Status: DISCONTINUED | OUTPATIENT
Start: 2021-08-17 | End: 2021-08-19

## 2021-08-17 RX ORDER — PANTOPRAZOLE SODIUM 20 MG/1
40 TABLET, DELAYED RELEASE ORAL
Refills: 0 | Status: DISCONTINUED | OUTPATIENT
Start: 2021-08-17 | End: 2021-08-18

## 2021-08-17 RX ORDER — OMEGA-3 ACID ETHYL ESTERS 1 G
4 CAPSULE ORAL DAILY
Refills: 0 | Status: DISCONTINUED | OUTPATIENT
Start: 2021-08-17 | End: 2021-08-19

## 2021-08-17 RX ORDER — FOLIC ACID 0.8 MG
1 TABLET ORAL DAILY
Refills: 0 | Status: DISCONTINUED | OUTPATIENT
Start: 2021-08-17 | End: 2021-08-19

## 2021-08-17 RX ORDER — FAMOTIDINE 10 MG/ML
20 INJECTION INTRAVENOUS ONCE
Refills: 0 | Status: COMPLETED | OUTPATIENT
Start: 2021-08-17 | End: 2021-08-17

## 2021-08-17 RX ORDER — INSULIN LISPRO 100/ML
VIAL (ML) SUBCUTANEOUS
Refills: 0 | Status: DISCONTINUED | OUTPATIENT
Start: 2021-08-17 | End: 2021-08-19

## 2021-08-17 RX ORDER — ACETAMINOPHEN 500 MG
650 TABLET ORAL EVERY 6 HOURS
Refills: 0 | Status: DISCONTINUED | OUTPATIENT
Start: 2021-08-17 | End: 2021-08-19

## 2021-08-17 RX ORDER — ASPIRIN/CALCIUM CARB/MAGNESIUM 324 MG
162 TABLET ORAL ONCE
Refills: 0 | Status: COMPLETED | OUTPATIENT
Start: 2021-08-17 | End: 2021-08-17

## 2021-08-17 RX ORDER — LIDOCAINE 4 G/100G
15 CREAM TOPICAL ONCE
Refills: 0 | Status: COMPLETED | OUTPATIENT
Start: 2021-08-17 | End: 2021-08-17

## 2021-08-17 RX ORDER — HEPARIN SODIUM 5000 [USP'U]/ML
5000 INJECTION INTRAVENOUS; SUBCUTANEOUS EVERY 12 HOURS
Refills: 0 | Status: DISCONTINUED | OUTPATIENT
Start: 2021-08-17 | End: 2021-08-19

## 2021-08-17 RX ORDER — PREGABALIN 225 MG/1
1000 CAPSULE ORAL DAILY
Refills: 0 | Status: DISCONTINUED | OUTPATIENT
Start: 2021-08-17 | End: 2021-08-19

## 2021-08-17 RX ADMIN — LIDOCAINE 15 MILLILITER(S): 4 CREAM TOPICAL at 18:43

## 2021-08-17 RX ADMIN — Medication 162 MILLIGRAM(S): at 14:57

## 2021-08-17 RX ADMIN — ATORVASTATIN CALCIUM 80 MILLIGRAM(S): 80 TABLET, FILM COATED ORAL at 22:38

## 2021-08-17 RX ADMIN — Medication 20 MILLILITER(S): at 18:43

## 2021-08-17 RX ADMIN — GABAPENTIN 200 MILLIGRAM(S): 400 CAPSULE ORAL at 22:38

## 2021-08-17 RX ADMIN — FAMOTIDINE 20 MILLIGRAM(S): 10 INJECTION INTRAVENOUS at 18:43

## 2021-08-17 NOTE — H&P ADULT - RESPIRATORY
Hx ETOH Abuse, clean x few years Breath Sounds equal & clear to percussion & auscultation, no accessory muscle use

## 2021-08-17 NOTE — H&P ADULT - ASSESSMENT
87 year old female patient with intermittent chest pain        -Admit to Tele      # Chest pain  -narrative consistent with GI etiology/ atypical chest pain  -will give a GI cocktail now  -currently pain free  -troponin negative  -will trend troponin  -trend EKG  -get morning echo  -get Cardiology evaluation    #CAD  -on statin, asa    # DM2  -hold oral hypoglycemics  -ISS    #HLD  -on statin    # Anemia  -stable and at baseline  -no bleeding or petechiae     # DVT ppx  -give heparin sq    # Disposition  -likely discharge after Cardiology evaluation

## 2021-08-17 NOTE — ED PROVIDER NOTE - PHYSICAL EXAMINATION
GENERAL: no acute distress, non-toxic appearing  HEENT: normal conjunctiva, oral mucosa moist  CARDIAC: regular rate and regular rhythm, 2+ equal radial pulses  PULM: clear to ascultation bilaterally, no appreciable crackles, rales, rhonchi, or wheezing, sats 98% on RA, no increased work of breathing  GI: abdomen nondistended, soft, nontender  : no CVA tenderness, no suprapubic tenderness  NEURO: alert and oriented x 3, normal speech, moving all extremities without lateralization  MSK: no visible deformities, no peripheral edema, calf tenderness/redness/swelling  SKIN: no visible rashes  PSYCH: appropriate mood and affect

## 2021-08-17 NOTE — ED PROVIDER NOTE - OBJECTIVE STATEMENT
87F PMH CAD w/ stents on ASA, DM2, HLD, GERD, presents to the ED after she had chest pain after she woke up yesterday morning, states it lasted most of the day, some radiation to L arm, associated with nausea. Pain went away last night, has been feeling well today. Denies any association of pain with diaphoresis, sob, palpitations, lightheadedness. Denies any fevers/chills, uri sxs, cough, abd pain, v/d, dark/bloody stools, orthopnea/leg swelling, urinary sxs. Pain doesn't radiate to the back, not tearing in quality. Pt hasn't seen cardiologist in over a year.    PCP: Dr. Jamal Carpio  Cards: Dr. Truman Carr

## 2021-08-17 NOTE — ED PROVIDER NOTE - ATTENDING CONTRIBUTION TO CARE
Attending Attestation:  Chris Su MD,  I have personally performed a history and physical examination of the patient and discussed management with the resident as well as the patient.  I reviewed the resident's note and agree with the documented findings and plan of care.  I have authored and modified critical sections of the Provider Note, including but not limited to HPI, ROS, Physical Exam and MDM.

## 2021-08-17 NOTE — ED ADULT NURSE NOTE - OBJECTIVE STATEMENT
Pt c/o left sided chest pain started yesterday, radiating to left arm. Pt states she was recently admitted to  for neuropathy. Pt has 24hr care and lives at home. Daughter at the bedside.

## 2021-08-17 NOTE — H&P ADULT - HISTORY OF PRESENT ILLNESS
87 year old female patient with pertinent PMH of CAD, DM2 and  GERD presented to the ED complaining of a 2 day history of an intermittent substernal chest pain that radiates to both arms. Patient endorses a burning pain with no clear aggravating factors that alleviated with tylenol. Denies any associated nausea, vomiting, pre-syncope,  palpitations or diaphoresis.  Patient states she has not seen her Cardiologist in a while. She lives alone but has 2 home aides who assist with her ADLs.      In the ED patient without EKG changes, concerning findings on telemetry monitoring or elevated troponin

## 2021-08-17 NOTE — H&P ADULT - NSHPPHYSICALEXAM_GEN_ALL_CORE
Vital Signs Last 24 Hrs  T(C): 36.7 (17 Aug 2021 12:22), Max: 36.7 (17 Aug 2021 12:22)  T(F): 98.1 (17 Aug 2021 12:22), Max: 98.1 (17 Aug 2021 12:22)  HR: 93 (17 Aug 2021 12:22) (89 - 93)  BP: 103/76 (17 Aug 2021 12:22) (103/76 - 130/55)  BP(mean): 70 (17 Aug 2021 12:14) (70 - 70)  RR: 18 (17 Aug 2021 12:22) (18 - 20)  SpO2: 97% (17 Aug 2021 12:22) (96% - 97%)

## 2021-08-17 NOTE — ED PROVIDER NOTE - CLINICAL SUMMARY MEDICAL DECISION MAKING FREE TEXT BOX
Likely GI etiology though given hx/rf's concern for ACS, low suspicion PE/aortic catastrophe/ptx/pna. Will do labs, ekg, trop, cxr. Likely tba for high risk cp.

## 2021-08-18 DIAGNOSIS — Z29.9 ENCOUNTER FOR PROPHYLACTIC MEASURES, UNSPECIFIED: ICD-10-CM

## 2021-08-18 DIAGNOSIS — I25.10 ATHEROSCLEROTIC HEART DISEASE OF NATIVE CORONARY ARTERY WITHOUT ANGINA PECTORIS: ICD-10-CM

## 2021-08-18 DIAGNOSIS — E44.0 MODERATE PROTEIN-CALORIE MALNUTRITION: ICD-10-CM

## 2021-08-18 DIAGNOSIS — R53.81 OTHER MALAISE: ICD-10-CM

## 2021-08-18 DIAGNOSIS — K21.9 GASTRO-ESOPHAGEAL REFLUX DISEASE WITHOUT ESOPHAGITIS: ICD-10-CM

## 2021-08-18 DIAGNOSIS — R07.9 CHEST PAIN, UNSPECIFIED: ICD-10-CM

## 2021-08-18 LAB
ALBUMIN SERPL ELPH-MCNC: 2.9 G/DL — LOW (ref 3.3–5)
ALP SERPL-CCNC: 116 U/L — SIGNIFICANT CHANGE UP (ref 40–120)
ALT FLD-CCNC: 20 U/L — SIGNIFICANT CHANGE UP (ref 12–78)
ANION GAP SERPL CALC-SCNC: 6 MMOL/L — SIGNIFICANT CHANGE UP (ref 5–17)
AST SERPL-CCNC: 16 U/L — SIGNIFICANT CHANGE UP (ref 15–37)
BASOPHILS # BLD AUTO: 0.05 K/UL — SIGNIFICANT CHANGE UP (ref 0–0.2)
BASOPHILS NFR BLD AUTO: 0.8 % — SIGNIFICANT CHANGE UP (ref 0–2)
BILIRUB SERPL-MCNC: 0.3 MG/DL — SIGNIFICANT CHANGE UP (ref 0.2–1.2)
BUN SERPL-MCNC: 31 MG/DL — HIGH (ref 7–23)
CALCIUM SERPL-MCNC: 9.3 MG/DL — SIGNIFICANT CHANGE UP (ref 8.5–10.1)
CHLORIDE SERPL-SCNC: 115 MMOL/L — HIGH (ref 96–108)
CO2 SERPL-SCNC: 22 MMOL/L — SIGNIFICANT CHANGE UP (ref 22–31)
COVID-19 SPIKE DOMAIN AB INTERP: POSITIVE
COVID-19 SPIKE DOMAIN ANTIBODY RESULT: 81.6 U/ML — HIGH
CREAT SERPL-MCNC: 0.78 MG/DL — SIGNIFICANT CHANGE UP (ref 0.5–1.3)
EOSINOPHIL # BLD AUTO: 0.21 K/UL — SIGNIFICANT CHANGE UP (ref 0–0.5)
EOSINOPHIL NFR BLD AUTO: 3.2 % — SIGNIFICANT CHANGE UP (ref 0–6)
GLUCOSE SERPL-MCNC: 107 MG/DL — HIGH (ref 70–99)
HCT VFR BLD CALC: 29.6 % — LOW (ref 34.5–45)
HGB BLD-MCNC: 9.2 G/DL — LOW (ref 11.5–15.5)
IMM GRANULOCYTES NFR BLD AUTO: 1.8 % — HIGH (ref 0–1.5)
LYMPHOCYTES # BLD AUTO: 0.97 K/UL — LOW (ref 1–3.3)
LYMPHOCYTES # BLD AUTO: 14.8 % — SIGNIFICANT CHANGE UP (ref 13–44)
MCHC RBC-ENTMCNC: 27.1 PG — SIGNIFICANT CHANGE UP (ref 27–34)
MCHC RBC-ENTMCNC: 31.1 GM/DL — LOW (ref 32–36)
MCV RBC AUTO: 87.1 FL — SIGNIFICANT CHANGE UP (ref 80–100)
MONOCYTES # BLD AUTO: 0.49 K/UL — SIGNIFICANT CHANGE UP (ref 0–0.9)
MONOCYTES NFR BLD AUTO: 7.5 % — SIGNIFICANT CHANGE UP (ref 2–14)
NEUTROPHILS # BLD AUTO: 4.73 K/UL — SIGNIFICANT CHANGE UP (ref 1.8–7.4)
NEUTROPHILS NFR BLD AUTO: 71.9 % — SIGNIFICANT CHANGE UP (ref 43–77)
PLATELET # BLD AUTO: 206 K/UL — SIGNIFICANT CHANGE UP (ref 150–400)
POTASSIUM SERPL-MCNC: 4.3 MMOL/L — SIGNIFICANT CHANGE UP (ref 3.5–5.3)
POTASSIUM SERPL-SCNC: 4.3 MMOL/L — SIGNIFICANT CHANGE UP (ref 3.5–5.3)
PROT SERPL-MCNC: 6.5 GM/DL — SIGNIFICANT CHANGE UP (ref 6–8.3)
RBC # BLD: 3.4 M/UL — LOW (ref 3.8–5.2)
RBC # FLD: 14.5 % — SIGNIFICANT CHANGE UP (ref 10.3–14.5)
SARS-COV-2 IGG+IGM SERPL QL IA: 81.6 U/ML — HIGH
SARS-COV-2 IGG+IGM SERPL QL IA: POSITIVE
SODIUM SERPL-SCNC: 143 MMOL/L — SIGNIFICANT CHANGE UP (ref 135–145)
TROPONIN I SERPL-MCNC: <0.015 NG/ML — SIGNIFICANT CHANGE UP (ref 0.01–0.04)
WBC # BLD: 6.57 K/UL — SIGNIFICANT CHANGE UP (ref 3.8–10.5)
WBC # FLD AUTO: 6.57 K/UL — SIGNIFICANT CHANGE UP (ref 3.8–10.5)

## 2021-08-18 PROCEDURE — 99233 SBSQ HOSP IP/OBS HIGH 50: CPT

## 2021-08-18 RX ORDER — PANTOPRAZOLE SODIUM 20 MG/1
40 TABLET, DELAYED RELEASE ORAL
Refills: 0 | Status: DISCONTINUED | OUTPATIENT
Start: 2021-08-18 | End: 2021-08-19

## 2021-08-18 RX ORDER — MULTIVIT-MIN/FERROUS GLUCONATE 9 MG/15 ML
1 LIQUID (ML) ORAL DAILY
Refills: 0 | Status: DISCONTINUED | OUTPATIENT
Start: 2021-08-18 | End: 2021-08-19

## 2021-08-18 RX ORDER — INSULIN LISPRO 100/ML
VIAL (ML) SUBCUTANEOUS AT BEDTIME
Refills: 0 | Status: DISCONTINUED | OUTPATIENT
Start: 2021-08-18 | End: 2021-08-19

## 2021-08-18 RX ADMIN — Medication 4 GRAM(S): at 09:49

## 2021-08-18 RX ADMIN — HEPARIN SODIUM 5000 UNIT(S): 5000 INJECTION INTRAVENOUS; SUBCUTANEOUS at 06:29

## 2021-08-18 RX ADMIN — PANTOPRAZOLE SODIUM 40 MILLIGRAM(S): 20 TABLET, DELAYED RELEASE ORAL at 21:27

## 2021-08-18 RX ADMIN — ATORVASTATIN CALCIUM 80 MILLIGRAM(S): 80 TABLET, FILM COATED ORAL at 21:27

## 2021-08-18 RX ADMIN — SERTRALINE 25 MILLIGRAM(S): 25 TABLET, FILM COATED ORAL at 09:49

## 2021-08-18 RX ADMIN — GABAPENTIN 200 MILLIGRAM(S): 400 CAPSULE ORAL at 13:18

## 2021-08-18 RX ADMIN — Medication 2: at 12:10

## 2021-08-18 RX ADMIN — PANTOPRAZOLE SODIUM 40 MILLIGRAM(S): 20 TABLET, DELAYED RELEASE ORAL at 06:31

## 2021-08-18 RX ADMIN — PREGABALIN 1000 MICROGRAM(S): 225 CAPSULE ORAL at 09:49

## 2021-08-18 RX ADMIN — Medication 1 MILLIGRAM(S): at 09:49

## 2021-08-18 RX ADMIN — LOSARTAN POTASSIUM 50 MILLIGRAM(S): 100 TABLET, FILM COATED ORAL at 09:49

## 2021-08-18 RX ADMIN — Medication 1: at 17:47

## 2021-08-18 RX ADMIN — GABAPENTIN 200 MILLIGRAM(S): 400 CAPSULE ORAL at 06:29

## 2021-08-18 RX ADMIN — GABAPENTIN 200 MILLIGRAM(S): 400 CAPSULE ORAL at 21:27

## 2021-08-18 RX ADMIN — HEPARIN SODIUM 5000 UNIT(S): 5000 INJECTION INTRAVENOUS; SUBCUTANEOUS at 17:47

## 2021-08-18 RX ADMIN — Medication 81 MILLIGRAM(S): at 09:49

## 2021-08-18 NOTE — CONSULT NOTE ADULT - SUBJECTIVE AND OBJECTIVE BOX
Cardiology Consultation    HPI: 87 year old female patient with pertinent PMH of CAD, DM2 and  GERD presented to the ED complaining of a 2 day history of an intermittent substernal chest pain that radiates to both arms. Patient endorses a burning pain with no clear aggravating factors that alleviated with tylenol. Denies any associated nausea, vomiting, pre-syncope,  palpitations or diaphoresis.  Patient states she has not seen her Cardiologist in a while. She lives alone but has 2 home aides who assist with her ADLs.    8/18. Seen/examined on 3E. Pt has no CP at this time. No SOB.  SR on tele.     PAST MEDICAL & SURGICAL HISTORY:  CAD (coronary artery disease)  Hypertension  Diabetes  HTN (hypertension)  Diabetes mellitus  HLD (hyperlipidemia)  No significant past surgical history    Allergies  codeine (Other)  contrast media (gadolinium-based) (Anaphylaxis)  Darvon (Other)    SOCIAL HISTORY: Denies tobacco, etoh abuse or illicit drug use    FAMILY HISTORY:  No pertinent family history in first degree relatives    MEDICATIONS  (STANDING):  aspirin  chewable 81 milliGRAM(s) Oral daily  atorvastatin 80 milliGRAM(s) Oral at bedtime  cyanocobalamin 1000 MICROGram(s) Oral daily  dextrose 40% Gel 15 Gram(s) Oral once  dextrose 5%. 1000 milliLiter(s) (50 mL/Hr) IV Continuous <Continuous>  dextrose 5%. 1000 milliLiter(s) (100 mL/Hr) IV Continuous <Continuous>  dextrose 50% Injectable 25 Gram(s) IV Push once  dextrose 50% Injectable 12.5 Gram(s) IV Push once  dextrose 50% Injectable 25 Gram(s) IV Push once  folic acid 1 milliGRAM(s) Oral daily  gabapentin Oral Tab/Cap - Peds 200 milliGRAM(s) Oral three times a day  glucagon  Injectable 1 milliGRAM(s) IntraMuscular once  heparin   Injectable 5000 Unit(s) SubCutaneous every 12 hours  insulin lispro (ADMELOG) corrective regimen sliding scale   SubCutaneous three times a day before meals  losartan 50 milliGRAM(s) Oral daily  omega-3-Acid Ethyl Esters 4 Gram(s) Oral daily  pantoprazole    Tablet 40 milliGRAM(s) Oral before breakfast  sertraline Oral Tab/Cap - Peds 25 milliGRAM(s) Oral daily    MEDICATIONS  (PRN):  acetaminophen   Tablet .. 650 milliGRAM(s) Oral every 6 hours PRN Mild Pain (1 - 3)  aluminum hydroxide/magnesium hydroxide/simethicone Suspension 30 milliLiter(s) Oral every 4 hours PRN Dyspepsia  meclizine 12.5 milliGRAM(s) Oral daily PRN Dizziness  melatonin 3 milliGRAM(s) Oral at bedtime PRN Insomnia  ondansetron Injectable 4 milliGRAM(s) IV Push every 8 hours PRN Nausea and/or Vomiting      Vital Signs Last 24 Hrs  T(C): 36.7 (17 Aug 2021 20:12), Max: 36.9 (17 Aug 2021 16:35)  T(F): 98.1 (17 Aug 2021 20:12), Max: 98.4 (17 Aug 2021 16:35)  HR: 82 (17 Aug 2021 20:12) (76 - 93)  BP: 154/58 (17 Aug 2021 20:12) (103/76 - 154/58)  BP(mean): 74 (17 Aug 2021 18:39) (70 - 74)  RR: 18 (17 Aug 2021 20:12) (17 - 20)  SpO2: 99% (17 Aug 2021 20:12) (96% - 100%)    REVIEW OF SYSTEMS:    CONSTITUTIONAL:  As per HPI.  HEENT:  Eyes:  No diplopia or blurred vision. ENT:  No earache, sore throat or runny nose.  CARDIOVASCULAR:  No pressure, squeezing, strangling, tightness, heaviness or aching about the chest, neck, axilla or epigastrium.  RESPIRATORY:  No cough, shortness of breath, PND or orthopnea.  GASTROINTESTINAL:  No nausea, vomiting or diarrhea.  GENITOURINARY:  No dysuria, frequency or urgency.  MUSCULOSKELETAL:  As per HPI.  SKIN:  No change in skin, hair or nails.  NEUROLOGIC:  No paresthesias, fasciculations, seizures or weakness.    PHYSICAL EXAMINATION:    GENERAL APPEARANCE:  Pt. is not currently dyspneic, in no distress. Pt. is alert, oriented, and pleasant.  HEENT:  Pupils are normal and react normally. No icterus. Mucous membranes well colored.  NECK:  Supple. No lymphadenopathy. Jugular venous pressure not elevated. Carotids equal.   HEART:   The cardiac impulse has a normal quality. There are no murmurs, rubs or gallops noted  CHEST:  Chest is clear to auscultation. Normal respiratory effort.  ABDOMEN:  Soft and nontender.   EXTREMITIES:  There is no edema.     I&O's Summary    17 Aug 2021 07:01  -  18 Aug 2021 07:00  --------------------------------------------------------  IN: 0 mL / OUT: 550 mL / NET: -550 mL    LABS:                        9.7    9.10  )-----------( 236      ( 17 Aug 2021 12:30 )             30.7     08-17    144  |  115<H>  |  33<H>  ----------------------------<  96  4.5   |  19<L>  |  0.78    Ca    9.2      17 Aug 2021 14:45  Mg     1.6     08-17    TPro  6.7  /  Alb  3.0<L>  /  TBili  0.3  /  DBili  x   /  AST  13<L>  /  ALT  21  /  AlkPhos  117  08-17    LIVER FUNCTIONS - ( 17 Aug 2021 14:45 )  Alb: 3.0 g/dL / Pro: 6.7 gm/dL / ALK PHOS: 117 U/L / ALT: 21 U/L / AST: 13 U/L / GGT: x           CARDIAC MARKERS ( 17 Aug 2021 21:20 )  <0.015 ng/mL / x     / x     / x     / x      CARDIAC MARKERS ( 17 Aug 2021 14:45 )  <0.015 ng/mL / x     / x     / x     / x        EKG: < from: 12 Lead ECG (08.17.21 @ 12:14) >  Normal sinus rhythm  Normal ECG    TELEMETRY: SR    CARDIAC TESTS: none    RADIOLOGY & ADDITIONAL STUDIES: CXR- clear lungs    ASSESSMENT & PLAN:

## 2021-08-18 NOTE — PHYSICAL THERAPY INITIAL EVALUATION ADULT - GENERAL OBSERVATIONS, REHAB EVAL
Pt rec'd supine in bed in NAD with HM (NSR 70) Intact and Bladder Suction intact; Pt denied any chest pain or discomfort at rest

## 2021-08-18 NOTE — DIETITIAN INITIAL EVALUATION ADULT. - NAME AND PHONE
Elizabeth Byrnes RDN, CDN, [facility]      [phone]   [email or web address]

## 2021-08-18 NOTE — DIETITIAN INITIAL EVALUATION ADULT. - OTHER INFO
87 year old female patient with pertinent PMH of CAD, DM2 and  GERD presented to the ED complaining of a 2 day history of an intermittent substernal chest pain that radiates to both arms. Patient endorses a burning pain with no clear aggravating factors that alleviated with tylenol. Denies any associated nausea, vomiting, pre-syncope,  palpitations or diaphoresis.  Patient states she has not seen her Cardiologist in a while. She lives alone but has 2 home aides who assist with her ADLs.      In the ED patient without EKG changes, concerning findings on telemetry monitoring or elevated troponin 87 year old female patient with pertinent PMH of CAD, DM2 and  GERD presented to the ED complaining of a 2 day history of an intermittent substernal chest pain that radiates to both arms. Patient endorses a burning pain with no clear aggravating factors that alleviated with tylenol. Denies any associated nausea, vomiting, pre-syncope,  palpitations or diaphoresis.  Patient states she has not seen her Cardiologist in a while. She lives alone but has 2 home aides who assist with her ADLs.  Pt reports no issues chew/swallow.  No GI issues.  Pt eating well.  HgbA1c is 7.2  At home, pt on Januvia,  metformin  at    Pt on SSI        In the ED patient without EKG changes, concerning findings on telemetry monitoring or elevated troponin

## 2021-08-18 NOTE — DIETITIAN NUTRITION RISK NOTIFICATION - ADDITIONAL COMMENTS/DIETITIAN RECOMMENDATIONS
Change diet to consistent carbohydrate  Add Glucerna 8 oz tid  add gelatein bid  Suggest add Vit C 500 mg BID, add Zinc Sulfate 220 mg x 10 days to promote wound healing   Record PO intake in EMR after each meal (nursing.)   Monitor PO intake, tolerance, labs and weight.

## 2021-08-18 NOTE — PROGRESS NOTE ADULT - SUBJECTIVE AND OBJECTIVE BOX
CHIEF COMPLAINT/DIAGNOSIS: CP    HPI: 87 year old female patient with pertinent PMH of CAD, DM2 and  GERD presented to the ED complaining of a 2 day history of an intermittent substernal chest pain that radiates to both arms. Patient endorses a burning pain with no clear aggravating factors that alleviated with tylenol. Denies any associated nausea, vomiting, pre-syncope,  palpitations or diaphoresis.  Patient states she has not seen her Cardiologist in a while. She lives alone but has 2 home aides who assist with her ADLs.  -- In the ED patient without EKG changes, concerning findings on telemetry monitoring or elevated troponin    8/18/21: feeling well denies CP. oob w/ assistance. denies sob.   REVIEW OF SYSTEMS:  All other review of systems is negative unless indicated above    PHYSICAL EXAM:  Constitutional: Awake and alert, elderly, female   HEENT:  Normal Hearing, MMM  Neck: Soft and supple   Respiratory: Breath sounds are clear bilaterally, No wheezing, rales or rhonchi  Cardiovascular: S1 and S2, RRR  Gastrointestinal: Bowel Sounds present, soft, nontender, nondistended, no guarding, no rebound  Extremities: No peripheral edema  Vascular: 2+ peripheral pulses  Neurological: A/O x 3, no focal deficits  Musculoskeletal: 5/5 strength b/l upper and lower extremities  Skin: No rashes    Vital Signs Last 24 Hrs  T(C): 36.7 (18 Aug 2021 08:31), Max: 36.9 (17 Aug 2021 16:35)  T(F): 98 (18 Aug 2021 08:31), Max: 98.4 (17 Aug 2021 16:35)  HR: 76 (18 Aug 2021 08:31) (76 - 82)  BP: 133/58 (18 Aug 2021 08:31) (105/65 - 154/58)  BP(mean): 74 (17 Aug 2021 18:39) (74 - 74)  RR: 18 (18 Aug 2021 08:31) (17 - 18)  SpO2: 98% (18 Aug 2021 08:31) (98% - 100%)    LABS: All Labs Reviewed:                        9.2    6.57  )-----------( 206      ( 18 Aug 2021 07:16 )             29.6     08-18    143  |  115<H>  |  31<H>  ----------------------------<  107<H>  4.3   |  22  |  0.78    Ca    9.3      18 Aug 2021 07:16  Mg     1.6     08-17    TPro  6.5  /  Alb  2.9<L>  /  TBili  0.3  /  DBili  x   /  AST  16  /  ALT  20  /  AlkPhos  116  08-18    CARDIAC MARKERS ( 18 Aug 2021 07:16 )  <0.015 ng/mL / x     / x     / x     / x      CARDIAC MARKERS ( 17 Aug 2021 21:20 )  <0.015 ng/mL / x     / x     / x     / x      CARDIAC MARKERS ( 17 Aug 2021 14:45 )  <0.015 ng/mL / x     / x     / x     / x        RADIOLOGY:  < from: Xray Chest 1 View- PORTABLE-Urgent (Xray Chest 1 View- PORTABLE-Urgent .) (08.17.21 @ 13:16) >  IMPRESSION: No acute finding or change.  < end of copied text >    ECHOCARDIOGRAM:  < from: TTE Echo Complete w/o Contrast w/ Doppler (08.17.21 @ 19:14) >   At least moderate aortic stenosis is present. (calculated area over   estimates degree of aortic stenosis)   The left atrium appears normal.   Endocardium is not well visualized, however, overall left ventricular   systolic function appears normal. Technically Difficult Study.   Estimated left ventricular ejection fraction is 60-65 %.   The IVC appears normal.   Fibrocalcific changes noted to the mitral valve leaflets with preserved   leaflet excursion.   Mild mitral annular calcification is present.   Mild mitral regurgitation is present.   EA reversal of the mitral inflow consistent with reduced compliance of the   left ventricle.   A pericardial effusion is not present.   Pleural effusion cannot be ruled out.   Pulmonic valve not well seen, probably normal pulmonic valve function.   The right atrium is normal.   The right ventricle is normal in size.   The tricuspid valve leaflets are thin and pliable; valve motion is normal.   Mild (1+) tricuspid valve regurgitation is present.  < end of copied text >    MEDICATIONS  (STANDING):  aspirin  chewable 81 milliGRAM(s) Oral daily  atorvastatin 80 milliGRAM(s) Oral at bedtime  cyanocobalamin 1000 MICROGram(s) Oral daily  dextrose 40% Gel 15 Gram(s) Oral once  dextrose 5%. 1000 milliLiter(s) (50 mL/Hr) IV Continuous <Continuous>  dextrose 5%. 1000 milliLiter(s) (100 mL/Hr) IV Continuous <Continuous>  dextrose 50% Injectable 25 Gram(s) IV Push once  dextrose 50% Injectable 12.5 Gram(s) IV Push once  dextrose 50% Injectable 25 Gram(s) IV Push once  folic acid 1 milliGRAM(s) Oral daily  gabapentin Oral Tab/Cap - Peds 200 milliGRAM(s) Oral three times a day  glucagon  Injectable 1 milliGRAM(s) IntraMuscular once  heparin   Injectable 5000 Unit(s) SubCutaneous every 12 hours  insulin lispro (ADMELOG) corrective regimen sliding scale   SubCutaneous three times a day before meals  insulin lispro (ADMELOG) corrective regimen sliding scale.   SubCutaneous at bedtime  losartan 50 milliGRAM(s) Oral daily  omega-3-Acid Ethyl Esters 4 Gram(s) Oral daily  pantoprazole    Tablet 40 milliGRAM(s) Oral before breakfast  sertraline Oral Tab/Cap - Peds 25 milliGRAM(s) Oral daily    MEDICATIONS  (PRN):  acetaminophen   Tablet .. 650 milliGRAM(s) Oral every 6 hours PRN Mild Pain (1 - 3)  aluminum hydroxide/magnesium hydroxide/simethicone Suspension 30 milliLiter(s) Oral every 4 hours PRN Dyspepsia  meclizine 12.5 milliGRAM(s) Oral daily PRN Dizziness  melatonin 3 milliGRAM(s) Oral at bedtime PRN Insomnia  ondansetron Injectable 4 milliGRAM(s) IV Push every 8 hours PRN Nausea and/or Vomiting    Home Medications:  aspirin 81 mg oral tablet, chewable: 1 tab(s) orally once a day (11 Aug 2021 12:11)  Crestor 20 mg oral tablet: 1 tab(s) orally once a day (at bedtime) (11 Aug 2021 12:11)  Fish Oil 1200 mg oral capsule: 1 cap(s) orally once a day (11 Aug 2021 12:11)  furosemide 40 mg oral tablet: 1 tab(s) orally once a week  ****Tuesday**** (11 Aug 2021 12:11)  Januvia 50 mg oral tablet: 1 tab(s) orally once a day (11 Aug 2021 12:11)  losartan 100 mg oral tablet: 0.5 tab(s) orally once a day (11 Aug 2021 12:11)  meclizine 12.5 mg oral tablet: 1 tab(s) orally once a day, As Needed (11 Aug 2021 12:11)  metFORMIN 500 mg oral tablet, extended release: 1 tab(s) orally 4 times a day (12 Aug 2021 13:49)  pantoprazole 40 mg oral delayed release tablet: 1 tab(s) orally once a day (at bedtime) (11 Aug 2021 12:11)  sertraline 25 mg oral tablet: 1 tab(s) orally once a day (11 Aug 2021 12:11)      TELEMETRY REVIEW: SR 70-90

## 2021-08-18 NOTE — DIETITIAN INITIAL EVALUATION ADULT. - PERTINENT MEDS FT
MEDICATIONS  (STANDING):  aspirin  chewable 81 milliGRAM(s) Oral daily  atorvastatin 80 milliGRAM(s) Oral at bedtime  cyanocobalamin 1000 MICROGram(s) Oral daily  dextrose 40% Gel 15 Gram(s) Oral once  dextrose 5%. 1000 milliLiter(s) (50 mL/Hr) IV Continuous <Continuous>  dextrose 5%. 1000 milliLiter(s) (100 mL/Hr) IV Continuous <Continuous>  dextrose 50% Injectable 25 Gram(s) IV Push once  dextrose 50% Injectable 12.5 Gram(s) IV Push once  dextrose 50% Injectable 25 Gram(s) IV Push once  folic acid 1 milliGRAM(s) Oral daily  gabapentin Oral Tab/Cap - Peds 200 milliGRAM(s) Oral three times a day  glucagon  Injectable 1 milliGRAM(s) IntraMuscular once  heparin   Injectable 5000 Unit(s) SubCutaneous every 12 hours  insulin lispro (ADMELOG) corrective regimen sliding scale   SubCutaneous three times a day before meals  insulin lispro (ADMELOG) corrective regimen sliding scale.   SubCutaneous at bedtime  losartan 50 milliGRAM(s) Oral daily  omega-3-Acid Ethyl Esters 4 Gram(s) Oral daily  pantoprazole    Tablet 40 milliGRAM(s) Oral before breakfast  sertraline Oral Tab/Cap - Peds 25 milliGRAM(s) Oral daily    MEDICATIONS  (PRN):  acetaminophen   Tablet .. 650 milliGRAM(s) Oral every 6 hours PRN Mild Pain (1 - 3)  aluminum hydroxide/magnesium hydroxide/simethicone Suspension 30 milliLiter(s) Oral every 4 hours PRN Dyspepsia  meclizine 12.5 milliGRAM(s) Oral daily PRN Dizziness  melatonin 3 milliGRAM(s) Oral at bedtime PRN Insomnia  ondansetron Injectable 4 milliGRAM(s) IV Push every 8 hours PRN Nausea and/or Vomiting      Januvia 50 mg oral tablet: 1 tab(s) orally once a day (11 Aug 2021 12:11)  metFORMIN 500 mg oral tablet, extended release: 1 tab(s) orally 4 times a day (12 Aug 2021 13:49)

## 2021-08-18 NOTE — DIETITIAN INITIAL EVALUATION ADULT. - MALNUTRITION
Patient meets criteria for moderate protein-calorie malnutrition in context of chronic disease.  PU stage 2 on coccyx Patient meets criteria for moderate protein-calorie malnutrition in context of chronic disease

## 2021-08-18 NOTE — PHYSICAL THERAPY INITIAL EVALUATION ADULT - MODALITIES TREATMENT COMMENTS
Pt left OOB in chair with HM Intact and Bladder Suction intact; CBIR; Pt instructed to not get up alone; CARO Armstrong made aware

## 2021-08-18 NOTE — DIETITIAN INITIAL EVALUATION ADULT. - PERTINENT LABORATORY DATA
08-18    143  |  115<H>  |  31<H>  ----------------------------<  107<H>  4.3   |  22  |  0.78    Ca    9.3      18 Aug 2021 07:16  Mg     1.6     08-17    TPro  6.5  /  Alb  2.9<L>  /  TBili  0.3  /  DBili  x   /  AST  16  /  ALT  20  /  AlkPhos  116  08-18

## 2021-08-18 NOTE — PHYSICAL THERAPY INITIAL EVALUATION ADULT - ACTIVE RANGE OF MOTION EXAMINATION, REHAB EVAL
BLE Hip and Knee Flex both limited due to weakness/stiffness; RUE Shoulder Flex limited due to weakness/stiffness/bilateral upper extremity Active ROM was WFL (within functional limits)/bilateral  lower extremity Active ROM was WFL (within functional limits)

## 2021-08-18 NOTE — DIETITIAN INITIAL EVALUATION ADULT. - ADD RECOMMEND
Record PO intake in EMR after each meal (nursing.) Add supplements. Tray set-up.  Monitor PO intake, tolerance, labs and weight. Record PO intake in EMR after each meal (nursing.) Suggest add Vit C 500 mg BID, add Zinc Sulfate 220 mg x 10 days to promote wound healing . Add supplements. Tray set-up.  Monitor PO intake, tolerance, labs and weight.

## 2021-08-19 ENCOUNTER — TRANSCRIPTION ENCOUNTER (OUTPATIENT)
Age: 86
End: 2021-08-19

## 2021-08-19 VITALS
SYSTOLIC BLOOD PRESSURE: 128 MMHG | DIASTOLIC BLOOD PRESSURE: 59 MMHG | RESPIRATION RATE: 18 BRPM | HEART RATE: 80 BPM | TEMPERATURE: 98 F | OXYGEN SATURATION: 98 %

## 2021-08-19 LAB
ANION GAP SERPL CALC-SCNC: 6 MMOL/L — SIGNIFICANT CHANGE UP (ref 5–17)
BUN SERPL-MCNC: 39 MG/DL — HIGH (ref 7–23)
CALCIUM SERPL-MCNC: 9.1 MG/DL — SIGNIFICANT CHANGE UP (ref 8.5–10.1)
CHLORIDE SERPL-SCNC: 111 MMOL/L — HIGH (ref 96–108)
CO2 SERPL-SCNC: 21 MMOL/L — LOW (ref 22–31)
CREAT SERPL-MCNC: 0.92 MG/DL — SIGNIFICANT CHANGE UP (ref 0.5–1.3)
GLUCOSE SERPL-MCNC: 140 MG/DL — HIGH (ref 70–99)
HCT VFR BLD CALC: 29.6 % — LOW (ref 34.5–45)
HGB BLD-MCNC: 9.5 G/DL — LOW (ref 11.5–15.5)
MCHC RBC-ENTMCNC: 27.7 PG — SIGNIFICANT CHANGE UP (ref 27–34)
MCHC RBC-ENTMCNC: 32.1 GM/DL — SIGNIFICANT CHANGE UP (ref 32–36)
MCV RBC AUTO: 86.3 FL — SIGNIFICANT CHANGE UP (ref 80–100)
PLATELET # BLD AUTO: 216 K/UL — SIGNIFICANT CHANGE UP (ref 150–400)
POTASSIUM SERPL-MCNC: 4.6 MMOL/L — SIGNIFICANT CHANGE UP (ref 3.5–5.3)
POTASSIUM SERPL-SCNC: 4.6 MMOL/L — SIGNIFICANT CHANGE UP (ref 3.5–5.3)
RBC # BLD: 3.43 M/UL — LOW (ref 3.8–5.2)
RBC # FLD: 14.6 % — HIGH (ref 10.3–14.5)
SODIUM SERPL-SCNC: 138 MMOL/L — SIGNIFICANT CHANGE UP (ref 135–145)
WBC # BLD: 9.02 K/UL — SIGNIFICANT CHANGE UP (ref 3.8–10.5)
WBC # FLD AUTO: 9.02 K/UL — SIGNIFICANT CHANGE UP (ref 3.8–10.5)

## 2021-08-19 PROCEDURE — 99238 HOSP IP/OBS DSCHRG MGMT 30/<: CPT

## 2021-08-19 RX ORDER — METFORMIN HYDROCHLORIDE 850 MG/1
1 TABLET ORAL
Qty: 0 | Refills: 0 | DISCHARGE

## 2021-08-19 RX ORDER — LOSARTAN POTASSIUM 100 MG/1
0.5 TABLET, FILM COATED ORAL
Qty: 0 | Refills: 0 | DISCHARGE

## 2021-08-19 RX ORDER — LOSARTAN POTASSIUM 100 MG/1
1 TABLET, FILM COATED ORAL
Qty: 0 | Refills: 0 | DISCHARGE
Start: 2021-08-19

## 2021-08-19 RX ORDER — MULTIVIT-MIN/FERROUS GLUCONATE 9 MG/15 ML
1 LIQUID (ML) ORAL
Qty: 0 | Refills: 0 | DISCHARGE
Start: 2021-08-19

## 2021-08-19 RX ORDER — PANTOPRAZOLE SODIUM 20 MG/1
1 TABLET, DELAYED RELEASE ORAL
Qty: 0 | Refills: 0 | DISCHARGE
Start: 2021-08-19

## 2021-08-19 RX ORDER — PANTOPRAZOLE SODIUM 20 MG/1
1 TABLET, DELAYED RELEASE ORAL
Qty: 0 | Refills: 0 | DISCHARGE

## 2021-08-19 RX ADMIN — LOSARTAN POTASSIUM 50 MILLIGRAM(S): 100 TABLET, FILM COATED ORAL at 09:02

## 2021-08-19 RX ADMIN — PREGABALIN 1000 MICROGRAM(S): 225 CAPSULE ORAL at 09:02

## 2021-08-19 RX ADMIN — Medication 4 GRAM(S): at 09:03

## 2021-08-19 RX ADMIN — GABAPENTIN 200 MILLIGRAM(S): 400 CAPSULE ORAL at 13:07

## 2021-08-19 RX ADMIN — GABAPENTIN 200 MILLIGRAM(S): 400 CAPSULE ORAL at 06:23

## 2021-08-19 RX ADMIN — Medication 81 MILLIGRAM(S): at 09:02

## 2021-08-19 RX ADMIN — Medication 1 MILLIGRAM(S): at 09:02

## 2021-08-19 RX ADMIN — SERTRALINE 25 MILLIGRAM(S): 25 TABLET, FILM COATED ORAL at 09:03

## 2021-08-19 RX ADMIN — Medication 2: at 12:19

## 2021-08-19 RX ADMIN — Medication 1 TABLET(S): at 09:02

## 2021-08-19 RX ADMIN — PANTOPRAZOLE SODIUM 40 MILLIGRAM(S): 20 TABLET, DELAYED RELEASE ORAL at 09:03

## 2021-08-19 RX ADMIN — HEPARIN SODIUM 5000 UNIT(S): 5000 INJECTION INTRAVENOUS; SUBCUTANEOUS at 06:22

## 2021-08-19 NOTE — DISCHARGE NOTE NURSING/CASE MANAGEMENT/SOCIAL WORK - PATIENT PORTAL LINK FT
You can access the FollowMyHealth Patient Portal offered by [facility] by registering at the following website: [email or web address]. By joining [employer]’s FollowMyHealth portal, you will also be able to view your health information using other applications (apps) compatible with our system.

## 2021-08-19 NOTE — DISCHARGE NOTE PROVIDER - HOSPITAL COURSE
See Full Progress Note From 8/19/21:    Assessment and Plan:   Problem/Plan - 1:  ·  Problem: Chest pain.  Plan: Atypical CP -- Possible GI related.  Pt has no CP at this time. Trops are negative x 2. No dynamic EKG changes. No events on tele.   Cont asa/statin.   2Decho reviewed. EF WNL, mod AS  Can have outpt ischemic eval upon d/c. Follows with Dr. Carr ([facility]).     Problem/Plan - 2:  ·  Problem: GERD (gastroesophageal reflux disease).  Plan: PPI - change to BID.     Problem/Plan - 3:  ·  Problem: Moderate protein-calorie malnutrition.  Plan: Nutrition eval appreciated - ensure, gelatin, MVI added.     Problem/Plan - 4:  ·  Problem: CAD (coronary artery disease).  Plan: Cont. ASA, Statin.     Problem/Plan - 5:  ·  Problem: Physical debility.  Plan: Home with Assistance and Home PT vs LOIDA (possible LOIDA to LT transition)  D/c planning for today.     Problem/Plan - 6:  Problem: DVT prophylaxis. Plan: SQ Heparin.    Dispo: discharge to Rehab -- possible transition to LT in stable condition    Final diagnosis, treatment plan, and follow-up recommendations were discussed and explained to the patient. The patient was given an opportunity to ask questions concerning the diagnosis and treatment plan. The patient acknowledged understanding of the diagnosis, treatment, and follow-up recommendations. The patient was advised to seek urgent care upon discharge if worsening symptoms develop prior to scheduled follow-up. Time spent on discharge included time with the patient, and also coordinating discharge care as outlined below.    Total time spent: 50 min

## 2021-08-19 NOTE — CONSULT NOTE ADULT - SUBJECTIVE AND OBJECTIVE BOX
Patient is a 87y old  Female who presents with a chief complaint of Chest pain.    HPI:  87 year old female patient with pertinent PMH of CAD, DM2 and  GERD presented to the ED complaining of a 2 day history of an intermittent substernal chest pain that radiates to both arms. Patient endorses a burning pain with no clear aggravating factors that alleviated with tylenol. Denies any associated nausea, vomiting, pre-syncope,  palpitations or diaphoresis.  Patient states she has not seen her Cardiologist in a while. She lives alone but has 2 home aides who assist with her ADLs.      In the ED patient without EKG changes, concerning findings on telemetry monitoring or elevated troponin.    Pt seen this am.  She denies any symptoms sitting in bed. NO cp .       PAST MEDICAL & SURGICAL HISTORY:  No pertinent past medical history    CAD (coronary artery disease)    Hypertension    Diabetes    HTN (hypertension)    Diabetes mellitus    HLD (hyperlipidemia)    No significant past surgical history    No significant past surgical history        MEDICATIONS  (STANDING):  aspirin  chewable 81 milliGRAM(s) Oral daily  atorvastatin 80 milliGRAM(s) Oral at bedtime  cyanocobalamin 1000 MICROGram(s) Oral daily  dextrose 40% Gel 15 Gram(s) Oral once  dextrose 5%. 1000 milliLiter(s) (50 mL/Hr) IV Continuous <Continuous>  dextrose 5%. 1000 milliLiter(s) (100 mL/Hr) IV Continuous <Continuous>  dextrose 50% Injectable 25 Gram(s) IV Push once  dextrose 50% Injectable 12.5 Gram(s) IV Push once  dextrose 50% Injectable 25 Gram(s) IV Push once  folic acid 1 milliGRAM(s) Oral daily  gabapentin Oral Tab/Cap - Peds 200 milliGRAM(s) Oral three times a day  glucagon  Injectable 1 milliGRAM(s) IntraMuscular once  heparin   Injectable 5000 Unit(s) SubCutaneous every 12 hours  insulin lispro (ADMELOG) corrective regimen sliding scale   SubCutaneous three times a day before meals  insulin lispro (ADMELOG) corrective regimen sliding scale.   SubCutaneous at bedtime  losartan 50 milliGRAM(s) Oral daily  multivitamin/minerals 1 Tablet(s) Oral daily  omega-3-Acid Ethyl Esters 4 Gram(s) Oral daily  pantoprazole    Tablet 40 milliGRAM(s) Oral two times a day  sertraline Oral Tab/Cap - Peds 25 milliGRAM(s) Oral daily    MEDICATIONS  (PRN):  acetaminophen   Tablet .. 650 milliGRAM(s) Oral every 6 hours PRN Mild Pain (1 - 3)  aluminum hydroxide/magnesium hydroxide/simethicone Suspension 30 milliLiter(s) Oral every 4 hours PRN Dyspepsia  meclizine 12.5 milliGRAM(s) Oral daily PRN Dizziness  melatonin 3 milliGRAM(s) Oral at bedtime PRN Insomnia  ondansetron Injectable 4 milliGRAM(s) IV Push every 8 hours PRN Nausea and/or Vomiting      FAMILY HISTORY:  No pertinent family history in first degree relatives        SOCIAL HISTORY: no recent smoking     REVIEW OF SYSTEMS:  CONSTITUTIONAL:    No fatigue, malaise, lethargy.  No fever or chills.    RESPIRATORY:  No cough.  No wheeze.  No hemoptysis.  No shortness of breath.  CARDIOVASCULAR:  c/o chest pains.  No palpitations. No shortness of breath, No orthopnea or PND.  GASTROINTESTINAL:  No abdominal pain.  No nausea or vomiting.    GENITOURINARY:    No hematuria.    MUSCULOSKELETAL:  No musculoskeletal pain.  No joint swelling.  No arthritis.  NEUROLOGICAL:  No tingling or numbness or weakness.  PSYCHIATRIC:  No confusion  SKIN:  No rashes.          Vital Signs Last 24 Hrs  T(C): 36.9 (19 Aug 2021 07:57), Max: 36.9 (19 Aug 2021 07:57)  T(F): 98.5 (19 Aug 2021 07:57), Max: 98.5 (19 Aug 2021 07:57)  HR: 80 (19 Aug 2021 07:57) (80 - 82)  BP: 128/59 (19 Aug 2021 07:57) (107/90 - 128/59)  BP(mean): --  RR: 18 (19 Aug 2021 07:57) (18 - 18)  SpO2: 98% (19 Aug 2021 07:57) (98% - 99%)    PHYSICAL EXAM-    Constitutional:   Head: Head is normocephalic and atraumatic.      Neck:  No JVD.     Cardiovascular: Regular rate and rhythm without S3, S4. ESM 2/6     Respiratory: Breath sounds are normal. No rales. No wheezing.    Abdomen: Soft, nontender, nondistended with positive bowel sounds.      Extremity: No tenderness. No  pitting edema     Neurologic: The patient is alert and oriented.      Skin: No rash, no obvious lesions noted.      Psychiatric: The patient appears to be emotionally stable.      INTERPRETATION OF TELEMETRY:SR     ECG: Sinus rythm , normal axis, no ST T changes.     I&O's Detail      LABS:                        9.5    9.02  )-----------( 216      ( 19 Aug 2021 07:10 )             29.6     08-19    138  |  111<H>  |  39<H>  ----------------------------<  140<H>  4.6   |  21<L>  |  0.92    Ca    9.1      19 Aug 2021 07:10  Mg     1.6     08-17    TPro  6.5  /  Alb  2.9<L>  /  TBili  0.3  /  DBili  x   /  AST  16  /  ALT  20  /  AlkPhos  116  08-18    CARDIAC MARKERS ( 18 Aug 2021 07:16 )  <0.015 ng/mL / x     / x     / x     / x      CARDIAC MARKERS ( 17 Aug 2021 21:20 )  <0.015 ng/mL / x     / x     / x     / x      CARDIAC MARKERS ( 17 Aug 2021 14:45 )  <0.015 ng/mL / x     / x     / x     / x              I&O's Summary    BNP  RADIOLOGY & ADDITIONAL STUDIES:  chest< from: Xray Chest 1 View- PORTABLE-Urgent (Xray Chest 1 View- PORTABLE-Urgent .) (08.17.21 @ 13:16) >    EXAM:  XR CHEST PORTABLE URGENT 1V                            PROCEDURE DATE:  08/17/2021          INTERPRETATION:  AP chest on August 17, 2021 at 1:07 PM. Patient has chest pain.    Heart normal for projection.    Clips in the right axilla again noted.    Lungs remain clear.    Advanced shoulder degeneration particularly on the right again noted.    Chest is similar to August 7 of this year.    IMPRESSION: No acute finding or change.    --- End of Report ---            BISHOP MCCARTHY MD; Attending Radiologist  This document has been electronically signed. Aug 18 2021 10:39AM    < end of copied text >  < from: TTE Echo Complete w/o Contrast w/ Doppler (08.17.21 @ 19:14) >   EXAM:  ECHO TTE WO CON COMP W DOPP         PROCEDURE DATE:  08/17/2021        INTERPRETATION:  Transthoracic Echocardiography Report (TTE)     Demographics     Patient name           NARINDER AMADO       Age           87 year(s)     Med Rec #       [id number]           Gender        Female     Account #              [id number]        Date of Birth 08/15/1934     Interpreting Physician Isadora Venegas MD     Room Number   0342     Referring Physician    Frankie Guaman        Sonographer   Jenniferpresley Lopezin     Date of study          08/17/2021 05:43 PM     Height                 60.98 in            Weight        138.89 pounds    Type of Study:     TTE procedure: ECHO TTE WO CON COMP W DOP     BP: 113/86 mmHg     Study Location: 3ETechnical Quality: Technically Difficullt    Indications   1) R07.9 - Chest pain    M-Mode Measurements (cm)     LVEDd: 4.8 cm               LVESd: 3.03 cm   IVSEd: 0.8 cm   LVPWd: 0.98 cm              AO Root Dimension: 3 cm                               ACS: 1cm                               LA: 3.4 cm                               LVOT: 1.7 cm    Doppler Measurements:     AV Velocity:261 cm/s                 MV Peak E-Wave: 96.7 cm/s   AV Peak Gradient: 27.25 mmHg         MV Peak A-Wave: 108 cm/s   AV Mean Gradient: 14 mmHg            MV E/A Ratio: 0.9 %   AV Area (Continuity):0.82 cm^2       MV Peak Gradient: 3.74 mmHg   TR Velocity:176 cm/s                 MV P1/2t: 59 msec   TR Gradient:12.3904 mmHg             MVA by [id number]   Estimated RAP:5 mmHg   RVSP:25 mmHg     Findings     Mitral Valve   Fibrocalcific changes noted to the mitral valve leaflets with preserved   leaflet excursion.   Mild mitral annular calcification is present.   Mild mitral regurgitation is present.   EA reversal of the mitral inflow consistent with reduced compliance of the   left ventricle.     Aortic Valve   At least moderate aortic stenosis is present. (calculated area over   estimates degree of aortic stenosis)     Tricuspid Valve   The tricuspid valve leaflets are thin and pliable; valve motion is normal.   Mild (1+) tricuspid valve regurgitation is present.     Pulmonic Valve   Pulmonic valve not well seen, probably normal pulmonic valve function.     Left Atrium   The left atrium appears normal.     Left Ventricle   Endocardium is not well visualized, however, overall left ventricular   systolic function appears normal. Technically Difficult Study.   Estimated left ventricular ejection fraction is 60-65 %.     Right Atrium   The right atrium is normal.     Right Ventricle   The right ventricle is normal in size.     Pericardial Effusion   A pericardial effusion is not present.     Pleural Effusion   Pleural effusion cannot be ruled out.     Miscellaneous   The IVC appears normal.     Impression     Summary     At least moderate aortic stenosis is present. (calculated area over   estimates degree of aortic stenosis)   The left atrium appears normal.   Endocardium is not well visualized, however, overall left ventricular   systolic function appears normal. Technically Difficult Study.   Estimated left ventricular ejection fraction is 60-65 %.   The IVC appears normal.   Fibrocalcific changes noted to the mitral valve leaflets with preserved   leaflet excursion.   Mild mitral annular calcification is present.   Mild mitral regurgitation is present.   EA reversal of the mitral inflow consistent with reduced compliance of the   left ventricle.   A pericardial effusion is not present.   Pleural effusion cannot be ruled out.   Pulmonic valve not well seen, probably normal pulmonic valve function.   The right atrium is normal.   The right ventricle is normal in size.   The tricuspid valve leaflets are thin and pliable; valve motion is normal.   Mild (1+) tricuspid valve regurgitation is present.     Signature     ----------------------------------------------------------------   Electronically signed by Isadora Venegas MD(Interpreting   physician) on 08/18/2021 03:30 PM   ----------------------------------------------------------------    < end of copied text >

## 2021-08-19 NOTE — PROGRESS NOTE ADULT - SUBJECTIVE AND OBJECTIVE BOX
CHIEF COMPLAINT/DIAGNOSIS: CP    HPI: 87 year old female patient with pertinent PMH of CAD, DM2 and  GERD presented to the ED complaining of a 2 day history of an intermittent substernal chest pain that radiates to both arms. Patient endorses a burning pain with no clear aggravating factors that alleviated with tylenol. Denies any associated nausea, vomiting, pre-syncope,  palpitations or diaphoresis.  Patient states she has not seen her Cardiologist in a while. She lives alone but has 2 home aides who assist with her ADLs.  -- In the ED patient without EKG changes, concerning findings on telemetry monitoring or elevated troponin    8/18/21: feeling well denies CP. oob w/ assistance. denies sob.   8/19/21:    REVIEW OF SYSTEMS:  All other review of systems is negative unless indicated above    PHYSICAL EXAM:  Constitutional: Awake and alert, elderly, female   HEENT:  Normal Hearing, MMM  Neck: Soft and supple   Respiratory: Breath sounds are clear bilaterally, No wheezing, rales or rhonchi  Cardiovascular: S1 and S2, RRR  Gastrointestinal: Bowel Sounds present, soft, nontender, nondistended, no guarding, no rebound  Extremities: No peripheral edema  Vascular: 2+ peripheral pulses  Neurological: A/O x 3, no focal deficits  Musculoskeletal: 5/5 strength b/l upper and lower extremities  Skin: No rashes    Vital Signs Last 24 Hrs  T(C): 36.9 (19 Aug 2021 07:57), Max: 36.9 (19 Aug 2021 07:57)  T(F): 98.5 (19 Aug 2021 07:57), Max: 98.5 (19 Aug 2021 07:57)  HR: 80 (19 Aug 2021 07:57) (80 - 82)  BP: 128/59 (19 Aug 2021 07:57) (107/90 - 128/59)  BP(mean): --  RR: 18 (19 Aug 2021 07:57) (18 - 18)  SpO2: 98% (19 Aug 2021 07:57) (98% - 99%)    LABS: All Labs Reviewed:                        9.5    9.02  )-----------( 216      ( 19 Aug 2021 07:10 )             29.6     08-19    138  |  111<H>  |  39<H>  ----------------------------<  140<H>  4.6   |  21<L>  |  0.92    Ca    9.1      19 Aug 2021 07:10  Mg     1.6     08-17    TPro  6.5  /  Alb  2.9<L>  /  TBili  0.3  /  DBili  x   /  AST  16  /  ALT  20  /  AlkPhos  116  08-18      CARDIAC MARKERS ( 18 Aug 2021 07:16 )  <0.015 ng/mL / x     / x     / x     / x      CARDIAC MARKERS ( 17 Aug 2021 21:20 )  <0.015 ng/mL / x     / x     / x     / x      CARDIAC MARKERS ( 17 Aug 2021 14:45 )  <0.015 ng/mL / x     / x     / x     / x        RADIOLOGY:  < from: Xray Chest 1 View- PORTABLE-Urgent (Xray Chest 1 View- PORTABLE-Urgent .) (08.17.21 @ 13:16) >  IMPRESSION: No acute finding or change.  < end of copied text >    ECHOCARDIOGRAM:  < from: TTE Echo Complete w/o Contrast w/ Doppler (08.17.21 @ 19:14) >   At least moderate aortic stenosis is present. (calculated area over   estimates degree of aortic stenosis)   The left atrium appears normal.   Endocardium is not well visualized, however, overall left ventricular   systolic function appears normal. Technically Difficult Study.   Estimated left ventricular ejection fraction is 60-65 %.   The IVC appears normal.   Fibrocalcific changes noted to the mitral valve leaflets with preserved   leaflet excursion.   Mild mitral annular calcification is present.   Mild mitral regurgitation is present.   EA reversal of the mitral inflow consistent with reduced compliance of the   left ventricle.   A pericardial effusion is not present.   Pleural effusion cannot be ruled out.   Pulmonic valve not well seen, probably normal pulmonic valve function.   The right atrium is normal.   The right ventricle is normal in size.   The tricuspid valve leaflets are thin and pliable; valve motion is normal.   Mild (1+) tricuspid valve regurgitation is present.  < end of copied text >    MEDICATIONS  (STANDING):  aspirin  chewable 81 milliGRAM(s) Oral daily  atorvastatin 80 milliGRAM(s) Oral at bedtime  cyanocobalamin 1000 MICROGram(s) Oral daily  dextrose 40% Gel 15 Gram(s) Oral once  dextrose 5%. 1000 milliLiter(s) (50 mL/Hr) IV Continuous <Continuous>  dextrose 5%. 1000 milliLiter(s) (100 mL/Hr) IV Continuous <Continuous>  dextrose 50% Injectable 25 Gram(s) IV Push once  dextrose 50% Injectable 12.5 Gram(s) IV Push once  dextrose 50% Injectable 25 Gram(s) IV Push once  folic acid 1 milliGRAM(s) Oral daily  gabapentin Oral Tab/Cap - Peds 200 milliGRAM(s) Oral three times a day  glucagon  Injectable 1 milliGRAM(s) IntraMuscular once  heparin   Injectable 5000 Unit(s) SubCutaneous every 12 hours  insulin lispro (ADMELOG) corrective regimen sliding scale   SubCutaneous three times a day before meals  insulin lispro (ADMELOG) corrective regimen sliding scale.   SubCutaneous at bedtime  losartan 50 milliGRAM(s) Oral daily  omega-3-Acid Ethyl Esters 4 Gram(s) Oral daily  pantoprazole    Tablet 40 milliGRAM(s) Oral before breakfast  sertraline Oral Tab/Cap - Peds 25 milliGRAM(s) Oral daily    MEDICATIONS  (PRN):  acetaminophen   Tablet .. 650 milliGRAM(s) Oral every 6 hours PRN Mild Pain (1 - 3)  aluminum hydroxide/magnesium hydroxide/simethicone Suspension 30 milliLiter(s) Oral every 4 hours PRN Dyspepsia  meclizine 12.5 milliGRAM(s) Oral daily PRN Dizziness  melatonin 3 milliGRAM(s) Oral at bedtime PRN Insomnia  ondansetron Injectable 4 milliGRAM(s) IV Push every 8 hours PRN Nausea and/or Vomiting    Home Medications:  aspirin 81 mg oral tablet, chewable: 1 tab(s) orally once a day (11 Aug 2021 12:11)  Crestor 20 mg oral tablet: 1 tab(s) orally once a day (at bedtime) (11 Aug 2021 12:11)  Fish Oil 1200 mg oral capsule: 1 cap(s) orally once a day (11 Aug 2021 12:11)  furosemide 40 mg oral tablet: 1 tab(s) orally once a week  ****Tuesday**** (11 Aug 2021 12:11)  Januvia 50 mg oral tablet: 1 tab(s) orally once a day (11 Aug 2021 12:11)  losartan 100 mg oral tablet: 0.5 tab(s) orally once a day (11 Aug 2021 12:11)  meclizine 12.5 mg oral tablet: 1 tab(s) orally once a day, As Needed (11 Aug 2021 12:11)  metFORMIN 500 mg oral tablet, extended release: 1 tab(s) orally 4 times a day (12 Aug 2021 13:49)  pantoprazole 40 mg oral delayed release tablet: 1 tab(s) orally once a day (at bedtime) (11 Aug 2021 12:11)  sertraline 25 mg oral tablet: 1 tab(s) orally once a day (11 Aug 2021 12:11)    TELEMETRY REVIEW: SR 70-90 CHIEF COMPLAINT/DIAGNOSIS: CP    HPI: 87 year old female patient with pertinent PMH of CAD, DM2 and  GERD presented to the ED complaining of a 2 day history of an intermittent substernal chest pain that radiates to both arms. Patient endorses a burning pain with no clear aggravating factors that alleviated with tylenol. Denies any associated nausea, vomiting, pre-syncope,  palpitations or diaphoresis.  Patient states she has not seen her Cardiologist in a while. She lives alone but has 2 home aides who assist with her ADLs.  -- In the ED patient without EKG changes, concerning findings on telemetry monitoring or elevated troponin    8/19/21: feeling well denies CP. oob w/ assistance. denies sob.     REVIEW OF SYSTEMS:  All other review of systems is negative unless indicated above    PHYSICAL EXAM:  Constitutional: Awake and alert, elderly, female   HEENT:  Normal Hearing, MMM  Neck: Soft and supple   Respiratory: Breath sounds are clear bilaterally, No wheezing, rales or rhonchi  Cardiovascular: S1 and S2, RRR  Gastrointestinal: Bowel Sounds present, soft, nontender, nondistended, no guarding, no rebound  Extremities: No peripheral edema  Vascular: 2+ peripheral pulses  Neurological: A/O x 3, no focal deficits  Musculoskeletal: 5/5 strength b/l upper and lower extremities  Skin: No rashes    Vital Signs Last 24 Hrs  T(C): 36.9 (19 Aug 2021 07:57), Max: 36.9 (19 Aug 2021 07:57)  T(F): 98.5 (19 Aug 2021 07:57), Max: 98.5 (19 Aug 2021 07:57)  HR: 80 (19 Aug 2021 07:57) (80 - 82)  BP: 128/59 (19 Aug 2021 07:57) (107/90 - 128/59)  BP(mean): --  RR: 18 (19 Aug 2021 07:57) (18 - 18)  SpO2: 98% (19 Aug 2021 07:57) (98% - 99%)    LABS: All Labs Reviewed:                        9.5    9.02  )-----------( 216      ( 19 Aug 2021 07:10 )             29.6     08-19    138  |  111<H>  |  39<H>  ----------------------------<  140<H>  4.6   |  21<L>  |  0.92    Ca    9.1      19 Aug 2021 07:10  Mg     1.6     08-17    TPro  6.5  /  Alb  2.9<L>  /  TBili  0.3  /  DBili  x   /  AST  16  /  ALT  20  /  AlkPhos  116  08-18      CARDIAC MARKERS ( 18 Aug 2021 07:16 )  <0.015 ng/mL / x     / x     / x     / x      CARDIAC MARKERS ( 17 Aug 2021 21:20 )  <0.015 ng/mL / x     / x     / x     / x      CARDIAC MARKERS ( 17 Aug 2021 14:45 )  <0.015 ng/mL / x     / x     / x     / x        RADIOLOGY:  < from: Xray Chest 1 View- PORTABLE-Urgent (Xray Chest 1 View- PORTABLE-Urgent .) (08.17.21 @ 13:16) >  IMPRESSION: No acute finding or change.  < end of copied text >    ECHOCARDIOGRAM:  < from: TTE Echo Complete w/o Contrast w/ Doppler (08.17.21 @ 19:14) >   At least moderate aortic stenosis is present. (calculated area over   estimates degree of aortic stenosis)   The left atrium appears normal.   Endocardium is not well visualized, however, overall left ventricular   systolic function appears normal. Technically Difficult Study.   Estimated left ventricular ejection fraction is 60-65 %.   The IVC appears normal.   Fibrocalcific changes noted to the mitral valve leaflets with preserved   leaflet excursion.   Mild mitral annular calcification is present.   Mild mitral regurgitation is present.   EA reversal of the mitral inflow consistent with reduced compliance of the   left ventricle.   A pericardial effusion is not present.   Pleural effusion cannot be ruled out.   Pulmonic valve not well seen, probably normal pulmonic valve function.   The right atrium is normal.   The right ventricle is normal in size.   The tricuspid valve leaflets are thin and pliable; valve motion is normal.   Mild (1+) tricuspid valve regurgitation is present.  < end of copied text >    MEDICATIONS  (STANDING):  aspirin  chewable 81 milliGRAM(s) Oral daily  atorvastatin 80 milliGRAM(s) Oral at bedtime  cyanocobalamin 1000 MICROGram(s) Oral daily  dextrose 40% Gel 15 Gram(s) Oral once  dextrose 5%. 1000 milliLiter(s) (50 mL/Hr) IV Continuous <Continuous>  dextrose 5%. 1000 milliLiter(s) (100 mL/Hr) IV Continuous <Continuous>  dextrose 50% Injectable 25 Gram(s) IV Push once  dextrose 50% Injectable 12.5 Gram(s) IV Push once  dextrose 50% Injectable 25 Gram(s) IV Push once  folic acid 1 milliGRAM(s) Oral daily  gabapentin Oral Tab/Cap - Peds 200 milliGRAM(s) Oral three times a day  glucagon  Injectable 1 milliGRAM(s) IntraMuscular once  heparin   Injectable 5000 Unit(s) SubCutaneous every 12 hours  insulin lispro (ADMELOG) corrective regimen sliding scale   SubCutaneous three times a day before meals  insulin lispro (ADMELOG) corrective regimen sliding scale.   SubCutaneous at bedtime  losartan 50 milliGRAM(s) Oral daily  omega-3-Acid Ethyl Esters 4 Gram(s) Oral daily  pantoprazole    Tablet 40 milliGRAM(s) Oral before breakfast  sertraline Oral Tab/Cap - Peds 25 milliGRAM(s) Oral daily    MEDICATIONS  (PRN):  acetaminophen   Tablet .. 650 milliGRAM(s) Oral every 6 hours PRN Mild Pain (1 - 3)  aluminum hydroxide/magnesium hydroxide/simethicone Suspension 30 milliLiter(s) Oral every 4 hours PRN Dyspepsia  meclizine 12.5 milliGRAM(s) Oral daily PRN Dizziness  melatonin 3 milliGRAM(s) Oral at bedtime PRN Insomnia  ondansetron Injectable 4 milliGRAM(s) IV Push every 8 hours PRN Nausea and/or Vomiting    Home Medications:  aspirin 81 mg oral tablet, chewable: 1 tab(s) orally once a day (11 Aug 2021 12:11)  Crestor 20 mg oral tablet: 1 tab(s) orally once a day (at bedtime) (11 Aug 2021 12:11)  Fish Oil 1200 mg oral capsule: 1 cap(s) orally once a day (11 Aug 2021 12:11)  furosemide 40 mg oral tablet: 1 tab(s) orally once a week  ****Tuesday**** (11 Aug 2021 12:11)  Januvia 50 mg oral tablet: 1 tab(s) orally once a day (11 Aug 2021 12:11)  losartan 100 mg oral tablet: 0.5 tab(s) orally once a day (11 Aug 2021 12:11)  meclizine 12.5 mg oral tablet: 1 tab(s) orally once a day, As Needed (11 Aug 2021 12:11)  metFORMIN 500 mg oral tablet, extended release: 1 tab(s) orally 4 times a day (12 Aug 2021 13:49)  pantoprazole 40 mg oral delayed release tablet: 1 tab(s) orally once a day (at bedtime) (11 Aug 2021 12:11)  sertraline 25 mg oral tablet: 1 tab(s) orally once a day (11 Aug 2021 12:11)    TELEMETRY REVIEW: SR 70-90

## 2021-08-19 NOTE — DISCHARGE NOTE NURSING/CASE MANAGEMENT/SOCIAL WORK - NSDCVIVACCINE_GEN_ALL_CORE_FT
COVID-19, mRNA, LNP-S, PF, 100 mcg/ 0.5 mL dose (Moderna); 25-May-2021 10:21; Adilene Trejo (RN); [employer] [id number] (Exp. Date: 20-Aug-2021); IntraMuscular; Deltoid Right.; 0.5 milliLiter(s);

## 2021-08-19 NOTE — PROGRESS NOTE ADULT - PROBLEM SELECTOR PLAN 1
Atypical CP -- Possible GI related.  Pt has no CP at this time. Trops are negative x 2. No dynamic EKG changes. No events on tele.   Cont asa/statin.   2Decho reviewed. EF WNL, mod AS  Can have outpt ischemic eval upon d/c. Follows with Dr. Carr ([facility])
Atypical CP -- Possible GI related.  Pt has no CP at this time. Trops are negative x 2. No dynamic EKG changes. No events on tele.   Cont asa/statin.   2Decho reviewed. EF WNL, mod AS  Can have outpt ischemic eval upon d/c. Follows with Dr. Carr ([facility])

## 2021-08-19 NOTE — DISCHARGE NOTE PROVIDER - NSDCMRMEDTOKEN_GEN_ALL_CORE_FT
aspirin 81 mg oral tablet, chewable: 1 tab(s) orally once a day  Crestor 20 mg oral tablet: 1 tab(s) orally once a day (at bedtime)  Fish Oil 1200 mg oral capsule: 1 cap(s) orally once a day  folic acid 1 mg oral tablet: 1 tab(s) orally once a day  furosemide 40 mg oral tablet: 1 tab(s) orally once a week  ****Tuesday****  gabapentin 100 mg oral capsule: 2 cap(s) orally 3 times a day  Januvia 50 mg oral tablet: 1 tab(s) orally once a day  losartan 100 mg oral tablet: 0.5 tab(s) orally once a day  meclizine 12.5 mg oral tablet: 1 tab(s) orally once a day, As Needed  metFORMIN 500 mg oral tablet, extended release: 1 tab(s) orally 4 times a day  pantoprazole 40 mg oral delayed release tablet: 1 tab(s) orally once a day (at bedtime)  sertraline 25 mg oral tablet: 1 tab(s) orally once a day  Vitamin B12 1000 mcg oral tablet: 1 tab(s) orally once a day   aspirin 81 mg oral tablet, chewable: 1 tab(s) orally once a day  Crestor 20 mg oral tablet: 1 tab(s) orally once a day (at bedtime)  Fish Oil 1200 mg oral capsule: 1 cap(s) orally once a day  folic acid 1 mg oral tablet: 1 tab(s) orally once a day  furosemide 40 mg oral tablet: 1 tab(s) orally once a week  ****Tuesday****  gabapentin 100 mg oral capsule: 2 cap(s) orally 3 times a day  Januvia 50 mg oral tablet: 1 tab(s) orally once a day  losartan 50 mg oral tablet: 1 tab(s) orally once a day  meclizine 12.5 mg oral tablet: 1 tab(s) orally once a day, As Needed  metFORMIN 500 mg oral tablet, extended release: 1 tab(s) orally 2 times a day  Multiple Vitamins with Minerals oral tablet: 1 tab(s) orally once a day  pantoprazole 40 mg oral delayed release tablet: 1 tab(s) orally 2 times a day  sertraline 25 mg oral tablet: 1 tab(s) orally once a day  Vitamin B12 1000 mcg oral tablet: 1 tab(s) orally once a day

## 2021-08-19 NOTE — DISCHARGE NOTE PROVIDER - DETAILS OF MALNUTRITION DIAGNOSIS/DIAGNOSES
This patient has been assessed with a concern for Malnutrition and was treated during this hospitalization for the following Nutrition diagnosis/diagnoses:     -  08/18/2021: Moderate protein-calorie malnutrition

## 2021-08-19 NOTE — DISCHARGE NOTE PROVIDER - CARE PROVIDER_API CALL
[place] [address]  Phone: [phone]  Fax: [phone]  Follow Up Time:    [facility]  [address] [place] [address]  Phone: [phone]  Fax: [phone]  Follow Up Time:     ELEONORA BROWN  Internal Medicine  [address] [facility] [address]  [place] [address]  Phone: [phone]  Fax: [phone]  Follow Up Time:

## 2021-08-19 NOTE — PROGRESS NOTE ADULT - PROBLEM SELECTOR PLAN 3
Nutrition eval appreciated - ensure, gelatin, MVI added
Nutrition eval appreciated - ensure, gelatin, MVI added

## 2021-08-19 NOTE — DISCHARGE NOTE PROVIDER - NSDCCPCAREPLAN_GEN_ALL_CORE_FT
PRINCIPAL DISCHARGE DIAGNOSIS  Diagnosis: Chest pain  Assessment and Plan of Treatment: You were admitted due to chest pain. Your cardiac enzymes and testing was normal. Your echocradiogram was reviewed - normal function. No events were seen on the temetry monitor.   Suspect atypical chest pain - GI related  Continue Protonix 40mg twice a day  Follow up with Dr. Carr ([facility]) for further management.      SECONDARY DISCHARGE DIAGNOSES  Diagnosis: Physical debility  Assessment and Plan of Treatment: Discharge to rehab facility

## 2021-08-19 NOTE — PROGRESS NOTE ADULT - PROBLEM SELECTOR PLAN 5
Home with Assistance and Home PT vs LOIDA (possible LOIDA to LT transition)  D/c planning for today
Home with Assistance and Home PT vs LOIDA (possible LOIDA to LT transition)

## 2021-08-19 NOTE — DISCHARGE NOTE NURSING/CASE MANAGEMENT/SOCIAL WORK - NSDCPEFALRISK_GEN_ALL_CORE
For information on Fall & injury Prevention, visit [email or web address]

## 2021-08-19 NOTE — CONSULT NOTE ADULT - ASSESSMENT
Chest pain - Chest pain is atypical in nature. So far cardiac enzymes and EKG did not reveal any ischemia.   Outpt ischemic heart disease evaluation recommended.  f/u with cardiologist as outpt.     Aortic stenosis- moderate per Doppler criteria on echo.  close monitoring and f/u as outpt.    HTN- BP optimal.continue current meds.    Other medical issues- Management per primary team.   Thank you for allowing me to participate in the care of this patient. Please feel free to contact me with any questions. 
A/P: 87F with above hx p/w CP.     1. CP. Pt has no CP at this time. Trops are negative x 2. No dynamic EKG changes. No events on tele.   Cont asa/statin. 2Decho pending.   Can have outpt ischemic eval upon d/c.     2. Dyslipidemia. Cont statin.     3. D/C planning. Can f/u as outpt.

## 2021-08-24 DIAGNOSIS — Z88.8 ALLERGY STATUS TO OTHER DRUGS, MEDICAMENTS AND BIOLOGICAL SUBSTANCES: ICD-10-CM

## 2021-08-24 DIAGNOSIS — Z91.041 RADIOGRAPHIC DYE ALLERGY STATUS: ICD-10-CM

## 2021-08-24 DIAGNOSIS — Z95.5 PRESENCE OF CORONARY ANGIOPLASTY IMPLANT AND GRAFT: ICD-10-CM

## 2021-08-24 DIAGNOSIS — R07.89 OTHER CHEST PAIN: ICD-10-CM

## 2021-08-24 DIAGNOSIS — Z79.84 LONG TERM (CURRENT) USE OF ORAL HYPOGLYCEMIC DRUGS: ICD-10-CM

## 2021-08-24 DIAGNOSIS — G62.9 POLYNEUROPATHY, UNSPECIFIED: ICD-10-CM

## 2021-08-24 DIAGNOSIS — I25.10 ATHEROSCLEROTIC HEART DISEASE OF NATIVE CORONARY ARTERY WITHOUT ANGINA PECTORIS: ICD-10-CM

## 2021-08-24 DIAGNOSIS — I35.0 NONRHEUMATIC AORTIC (VALVE) STENOSIS: ICD-10-CM

## 2021-08-24 DIAGNOSIS — E44.0 MODERATE PROTEIN-CALORIE MALNUTRITION: ICD-10-CM

## 2021-08-24 DIAGNOSIS — E78.5 HYPERLIPIDEMIA, UNSPECIFIED: ICD-10-CM

## 2021-08-24 DIAGNOSIS — E11.9 TYPE 2 DIABETES MELLITUS WITHOUT COMPLICATIONS: ICD-10-CM

## 2021-08-24 DIAGNOSIS — Z79.82 LONG TERM (CURRENT) USE OF ASPIRIN: ICD-10-CM

## 2021-08-24 DIAGNOSIS — K21.9 GASTRO-ESOPHAGEAL REFLUX DISEASE WITHOUT ESOPHAGITIS: ICD-10-CM

## 2021-08-24 DIAGNOSIS — D64.9 ANEMIA, UNSPECIFIED: ICD-10-CM

## 2021-08-24 DIAGNOSIS — R53.81 OTHER MALAISE: ICD-10-CM

## 2022-01-27 NOTE — PHYSICAL THERAPY INITIAL EVALUATION ADULT - RANGE OF MOTION EXAMINATION, REHAB EVAL
except for b/l shoulder 0-90 deg/bilateral upper extremity ROM was WFL (within functional limits)/bilateral lower extremity ROM was WFL (within functional limits) No

## 2022-02-25 NOTE — PHYSICAL THERAPY INITIAL EVALUATION ADULT - AMBULATION SKILLS, REHAB EVAL
RW/needed assist/needs device Patient with one or more new problems requiring additional work-up/treatment.

## 2022-06-14 NOTE — DISCHARGE NOTE PROVIDER - PROVIDER TOKENS
Encounter addended by: Jose Rosales MD PhD on: 6/14/2022 1:40 PM   Actions taken: Clinical Note Signed PROVIDER:[TOKEN:[[id number]]] PROVIDER:[TOKEN:[[id number]]],PROVIDER:[TOKEN:[[id number]]]

## 2022-08-18 NOTE — ED ADULT NURSE NOTE - NSHISCREENINGQ1_ED_A_ED
Leonardo Fishman M.D. [address] [place] [address], [place] [address]  [phone]        2022        [place] [address], [facility]      RE:   Raquel Romero  :  1960      Dear [facility]:    CHIEF COMPLAINT:  1.  Marked fatigue and loss of energy. 2.  Severe sleep apnea, on a CPAP mask. 3.  SVT. HISTORY OF PRESENT ILLNESS:  I had the pleasure of seeing Mrs. Raquel Romero in our office on 2022. She is a pleasant 28-year-old female with a long history of hypertension, severe fibromyalgia and arthritis. On 2019, she had palpitations and shortness of breath, and a CT scan showed pulmonary embolism in the distal right pulmonary artery. Echocardiogram showed an EF of 60% to 65% with dilated right atrium and right ventricle. She had SVT at 170 beats per minute, discharged on 2019, placed on Eliquis 5 mg b.i.d. which she has remained. She went to the emergency room again on 2019. An EKG showed SVT at 151 beats per minute, converted to sinus rhythm with her on metoprolol. Another SVT episode on 2020, placed her on flecainide 50 mg b.i.d. She had more edema, and we increased her Aldactone to 25 mg b.i.d. She has remained on flecainide at 50 mg b.i.d. which has controlled her SVT nicely. She does have severe disk disease and does get injections. She has been extremely tired over the last several months. She has difficulty getting up in the morning. She does have some blood in her stool and does have a history of hemorrhoids. She has occasional chest pain, however, it occurs with sitting as well as with activity. She has lost her balance occasionally. She has had no hospitalizations or procedures since her last visit. Denies any PND or orthopnea. Her main complaint today again was marked fatigue. CARDIAC RISK FACTORS:  Hypertension:  Positive.   Other Family Members:  Positive. Peripheral Vascular Disease:  Negative. Smoking:  Negative. Diabetes:  Negative. Hyperlipidemia:  Positive. MEDICATIONS AT THIS TIME:  She is on Elavil 100 mg nightly, Eliquis 5 mg b.i.d., Celebrex 100 mg b.i.d., vitamin D 2000 units daily, Cartia  mg daily, Pepcid 20 mg b.i.d., Tambocor 50 mg b.i.d., folic acid 1 mg daily, Lasix 20 mg daily, Toprol-XL 50 mg daily, Lyrica 150 mg b.i.d., Requip 0.5 mg t.i.d., Aldactone 25 mg b.i.d. PAST MEDICAL AND SURGICAL HISTORY:  1. Left thyroid nodule on 07/25/2013. 2.  Hypertension. 3.  Fibromyalgia. 4.  DVT in the left leg in 2012. 5.  Depression. 6.  Left and right total knee replacements. 7.  Cholecystectomy on 08/22/2013.  8.  Right hip replacement in 2016.  9.  Cellulitis in 02/2021. 10.  Pulmonary emboli, currently on Eliquis 5 mg b.i.d. FAMILY HISTORY:  Mother had atrial fibrillation. Father had CVA. Brother had thyroid disease and atrial fibrillation. SOCIAL HISTORY:  She is 58years old, , no children. Does not smoke or drink alcohol. Does not work outside her home. Sister, Addy Barnes, works in a sleep lab in [place]. She does have sleep apnea and wears a CPAP mask. Does not exercise. REVIEW OF SYSTEMS:  Cardiac as above. Other systems reviewed including constitutional, eyes, ears, nose and throat, cardiovascular, respiratory, GI, , musculoskeletal, integumentary, neurologic, endocrine, hematologic and allergic/immunologic, are negative except for what is described above. No weight loss or weight gain. No change in bowel habits. No blood in stools. No fevers, sweats or chills. PHYSICAL EXAMINATION:  VITAL SIGNS:  Her blood pressure was 127/71 with a heart rate of 65 and regular. Respiratory rate 18. O2 saturation 95%. GENERAL:  She is a very pleasant 58-year-old female. Denied pain. She was oriented to person, place and time. Answered questions appropriately.   SKIN:  No unusual skin in 6 months. DISCUSSION:  From a cardiac standpoint, Mrs. Eleni Sacks is doing well. She has had no episodes of SVT and the flecainide is working well. Her QTC interval is within good limits. Her main complaint is that of fatigue which started several months ago. She is again being treated for her sleep apnea. I will have her check her machine to make sure that it is working properly. We will also check a cortisol and ACT level to make sure she does not have an adrenal abnormality. I will check an echocardiogram to look at LV function and also her right-sided chambers and try to get an idea of her pulmonary pressures. Thank you very much for allowing me the privilege of seeing Mrs. Brennon Lara. If you have any questions on my thoughts, please do not hesitate to contact me.     Sincerely,        Eri Gandara    D: 08/14/2022 19:45:30     T: 08/14/2022 19:49:10     [email or web address]  Job#: [id number]   Doc#: [id number] No

## 2022-12-29 NOTE — PATIENT PROFILE ADULT - BRADEN MOBILITY
Pt c/o rt sided lower back pain x 1 wk. denies recent trauma to area. denies pmhx. (3) slightly limited

## 2023-02-28 NOTE — H&P ADULT - RS GEN PE MLT RESP DETAILS PC
unable to determine
airway patent/breath sounds equal/good air movement/respirations non-labored/clear to auscultation bilaterally

## 2023-07-19 NOTE — PROGRESS NOTE ADULT - NSPROGADDITIONALINFOA_GEN_ALL_CORE
Spoke w/ daughter Angelica - all updates provided 8/18
Spoke w/ daughter Angelica - all updates provided 8/18
No (0)

## 2023-08-15 NOTE — H&P ADULT - NEGATIVE GENERAL SYMPTOMS
Nisha James MD     no fever/no chills

## 2024-02-03 NOTE — PHYSICAL THERAPY INITIAL EVALUATION ADULT - ASSISTIVE DEVICE FOR TRANSFER: SIT/STAND, REHAB EVAL
PROVIDER:[TOKEN:[[id number]]],PROVIDER:[TOKEN:[[id number]]] rolling walker

## 2024-02-16 NOTE — DISCHARGE NOTE ADULT - CARE PROVIDERS DIRECT ADDRESSES
Conjunctivae and eyelids appear normal,  Sclerae : White without injection 
,DirectAddress_Unknown
Behavioral [facility]

## 2024-07-23 NOTE — ED STATDOCS - CARDIOVASCULAR [-], MLM
Pt stated Dr. Filiberto Cates in [facility] is a general surgeon who specializes in Lymphedema, she stated she diagnosed herself with this and is wanting a referral, please adv.   no peripheral edema/no palpitations/no chest pain/no syncope

## 2024-09-27 NOTE — ED ADULT TRIAGE NOTE - HEIGHT IN FEET
The biopsies that were performed in your stomach did not show any evidence of H. pylori bacterial infection.  The recommendation is to follow-up in the office as needed.      Yuliya Ludwig MD
5